# Patient Record
Sex: FEMALE | Race: BLACK OR AFRICAN AMERICAN | Employment: FULL TIME | URBAN - METROPOLITAN AREA
[De-identification: names, ages, dates, MRNs, and addresses within clinical notes are randomized per-mention and may not be internally consistent; named-entity substitution may affect disease eponyms.]

---

## 2017-01-07 DIAGNOSIS — I10 ESSENTIAL HYPERTENSION, BENIGN: ICD-10-CM

## 2017-01-07 LAB
ANION GAP SERPL CALCULATED.3IONS-SCNC: 12 MEQ/L (ref 7–13)
BUN BLDV-MCNC: 13 MG/DL (ref 6–20)
CALCIUM SERPL-MCNC: 10 MG/DL (ref 8.6–10.2)
CHLORIDE BLD-SCNC: 99 MEQ/L (ref 98–107)
CO2: 23 MEQ/L (ref 22–29)
CREAT SERPL-MCNC: 0.87 MG/DL (ref 0.5–0.9)
GFR AFRICAN AMERICAN: >60
GFR NON-AFRICAN AMERICAN: >60
GLUCOSE BLD-MCNC: 135 MG/DL (ref 74–109)
HBA1C MFR BLD: 7.3 % (ref 4.8–5.9)
POTASSIUM SERPL-SCNC: 4.1 MEQ/L (ref 3.5–5.1)
SODIUM BLD-SCNC: 134 MEQ/L (ref 132–144)

## 2017-01-09 ENCOUNTER — OFFICE VISIT (OUTPATIENT)
Dept: INTERNAL MEDICINE | Age: 47
End: 2017-01-09

## 2017-01-09 VITALS
DIASTOLIC BLOOD PRESSURE: 80 MMHG | OXYGEN SATURATION: 99 % | RESPIRATION RATE: 16 BRPM | SYSTOLIC BLOOD PRESSURE: 134 MMHG | BODY MASS INDEX: 34.16 KG/M2 | WEIGHT: 205 LBS | HEIGHT: 65 IN | TEMPERATURE: 97.9 F | HEART RATE: 92 BPM

## 2017-01-09 DIAGNOSIS — J18.9 PNEUMONIA OF LEFT LOWER LOBE DUE TO INFECTIOUS ORGANISM: ICD-10-CM

## 2017-01-09 DIAGNOSIS — R94.31 PROLONGED Q-T INTERVAL ON ECG: ICD-10-CM

## 2017-01-09 DIAGNOSIS — I10 ESSENTIAL HYPERTENSION, BENIGN: Primary | ICD-10-CM

## 2017-01-09 DIAGNOSIS — D72.828 OTHER ELEVATED WHITE BLOOD CELL (WBC) COUNT: ICD-10-CM

## 2017-01-09 DIAGNOSIS — Z86.79 HISTORY OF SUPRAVENTRICULAR TACHYCARDIA: ICD-10-CM

## 2017-01-09 PROCEDURE — 99214 OFFICE O/P EST MOD 30 MIN: CPT | Performed by: INTERNAL MEDICINE

## 2017-01-09 RX ORDER — DOXYCYCLINE 100 MG/1
1 CAPSULE ORAL 2 TIMES DAILY
COMMUNITY
Start: 2016-12-30 | End: 2017-04-24

## 2017-01-09 ASSESSMENT — ENCOUNTER SYMPTOMS
CONSTIPATION: 0
DIARRHEA: 0
VOMITING: 0
WHEEZING: 0
COUGH: 1
SHORTNESS OF BREATH: 0
ABDOMINAL PAIN: 0
NAUSEA: 0

## 2017-01-28 ENCOUNTER — HOSPITAL ENCOUNTER (OUTPATIENT)
Dept: GENERAL RADIOLOGY | Age: 47
Discharge: HOME | End: 2017-01-28
Payer: COMMERCIAL

## 2017-01-28 DIAGNOSIS — D72.828 OTHER ELEVATED WHITE BLOOD CELL (WBC) COUNT: ICD-10-CM

## 2017-01-28 DIAGNOSIS — J18.9 PNEUMONIA OF LEFT LOWER LOBE DUE TO INFECTIOUS ORGANISM: ICD-10-CM

## 2017-01-28 LAB
HCT VFR BLD CALC: 37 % (ref 37–47)
HEMOGLOBIN: 11.9 G/DL (ref 12–16)
MCH RBC QN AUTO: 23.7 PG (ref 27–31.3)
MCHC RBC AUTO-ENTMCNC: 32 % (ref 33–37)
MCV RBC AUTO: 73.8 FL (ref 82–100)
PDW BLD-RTO: 14 % (ref 11.5–14.5)
PLATELET # BLD: 219 K/UL (ref 130–400)
RBC # BLD: 5.02 M/UL (ref 4.2–5.4)
WBC # BLD: 8.6 K/UL (ref 4.8–10.8)

## 2017-01-28 PROCEDURE — 71020 XR CHEST STANDARD TWO VW: CPT

## 2017-02-01 ENCOUNTER — OFFICE VISIT (OUTPATIENT)
Dept: INTERNAL MEDICINE | Age: 47
End: 2017-02-01

## 2017-02-01 VITALS
DIASTOLIC BLOOD PRESSURE: 92 MMHG | SYSTOLIC BLOOD PRESSURE: 148 MMHG | BODY MASS INDEX: 34.66 KG/M2 | WEIGHT: 208 LBS | OXYGEN SATURATION: 99 % | TEMPERATURE: 98.4 F | RESPIRATION RATE: 16 BRPM | HEART RATE: 98 BPM | HEIGHT: 65 IN

## 2017-02-01 DIAGNOSIS — I10 ESSENTIAL HYPERTENSION, BENIGN: Primary | ICD-10-CM

## 2017-02-01 DIAGNOSIS — E78.1 HYPERTRIGLYCERIDEMIA: ICD-10-CM

## 2017-02-01 DIAGNOSIS — E11.9 TYPE 2 DIABETES MELLITUS WITHOUT COMPLICATION, WITH LONG-TERM CURRENT USE OF INSULIN (HCC): ICD-10-CM

## 2017-02-01 DIAGNOSIS — Z79.4 TYPE 2 DIABETES MELLITUS WITHOUT COMPLICATION, WITH LONG-TERM CURRENT USE OF INSULIN (HCC): ICD-10-CM

## 2017-02-01 DIAGNOSIS — D56.3 ALPHA THALASSEMIA TRAIT: ICD-10-CM

## 2017-02-01 PROCEDURE — 99213 OFFICE O/P EST LOW 20 MIN: CPT | Performed by: INTERNAL MEDICINE

## 2017-02-01 ASSESSMENT — ENCOUNTER SYMPTOMS
COUGH: 1
NAUSEA: 0
VOMITING: 0
SHORTNESS OF BREATH: 0
DIARRHEA: 0
WHEEZING: 0
ABDOMINAL PAIN: 0

## 2017-03-06 DIAGNOSIS — I10 ESSENTIAL HYPERTENSION, BENIGN: ICD-10-CM

## 2017-03-06 RX ORDER — HYDRALAZINE HYDROCHLORIDE 25 MG/1
TABLET, FILM COATED ORAL
Qty: 120 TABLET | Refills: 1 | Status: SHIPPED | OUTPATIENT
Start: 2017-03-06 | End: 2017-05-26 | Stop reason: SDUPTHER

## 2017-03-06 RX ORDER — LOSARTAN POTASSIUM AND HYDROCHLOROTHIAZIDE 25; 100 MG/1; MG/1
TABLET ORAL
Qty: 30 TABLET | Refills: 1 | Status: SHIPPED | OUTPATIENT
Start: 2017-03-06 | End: 2017-05-07 | Stop reason: SDUPTHER

## 2017-03-06 RX ORDER — METOPROLOL SUCCINATE 100 MG/1
TABLET, EXTENDED RELEASE ORAL
Qty: 30 TABLET | Refills: 1 | Status: SHIPPED | OUTPATIENT
Start: 2017-03-06 | End: 2017-05-26 | Stop reason: SDUPTHER

## 2017-04-24 ENCOUNTER — HOSPITAL ENCOUNTER (EMERGENCY)
Age: 47
Discharge: HOME | End: 2017-04-24
Attending: EMERGENCY MEDICINE
Payer: COMMERCIAL

## 2017-04-24 ENCOUNTER — APPOINTMENT (OUTPATIENT)
Dept: CT IMAGING | Age: 47
End: 2017-04-24
Payer: COMMERCIAL

## 2017-04-24 VITALS
RESPIRATION RATE: 18 BRPM | OXYGEN SATURATION: 99 % | HEART RATE: 87 BPM | HEIGHT: 65 IN | TEMPERATURE: 98.7 F | BODY MASS INDEX: 35.32 KG/M2 | DIASTOLIC BLOOD PRESSURE: 99 MMHG | SYSTOLIC BLOOD PRESSURE: 146 MMHG | WEIGHT: 212 LBS

## 2017-04-24 DIAGNOSIS — R10.31 ABDOMINAL WALL PAIN IN RIGHT LOWER QUADRANT: Primary | ICD-10-CM

## 2017-04-24 DIAGNOSIS — T14.8XXA MUSCLE STRAIN: ICD-10-CM

## 2017-04-24 DIAGNOSIS — R10.31 ABDOMINAL PAIN, RIGHT LOWER QUADRANT: ICD-10-CM

## 2017-04-24 PROCEDURE — 99284 EMERGENCY DEPT VISIT MOD MDM: CPT

## 2017-04-24 PROCEDURE — 74176 CT ABD & PELVIS W/O CONTRAST: CPT

## 2017-04-24 RX ORDER — CYCLOBENZAPRINE HCL 10 MG
10 TABLET ORAL 3 TIMES DAILY PRN
Qty: 30 TABLET | Refills: 0 | Status: SHIPPED | OUTPATIENT
Start: 2017-04-24 | End: 2017-05-04

## 2017-04-24 ASSESSMENT — PAIN DESCRIPTION - PAIN TYPE: TYPE: ACUTE PAIN

## 2017-04-24 ASSESSMENT — ENCOUNTER SYMPTOMS
VOMITING: 0
FACIAL SWELLING: 0
ABDOMINAL DISTENTION: 0
RHINORRHEA: 0
EYE DISCHARGE: 0
WHEEZING: 0
ABDOMINAL PAIN: 1
PHOTOPHOBIA: 0
SHORTNESS OF BREATH: 0
COLOR CHANGE: 0

## 2017-04-24 ASSESSMENT — PAIN DESCRIPTION - ORIENTATION: ORIENTATION: RIGHT;LOWER

## 2017-04-24 ASSESSMENT — PAIN SCALES - GENERAL: PAINLEVEL_OUTOF10: 5

## 2017-04-24 ASSESSMENT — PAIN DESCRIPTION - LOCATION: LOCATION: ABDOMEN

## 2017-04-24 ASSESSMENT — PAIN DESCRIPTION - ONSET: ONSET: SUDDEN

## 2017-04-24 ASSESSMENT — PAIN DESCRIPTION - FREQUENCY: FREQUENCY: CONTINUOUS

## 2017-04-24 ASSESSMENT — PAIN DESCRIPTION - DESCRIPTORS: DESCRIPTORS: SHARP

## 2017-05-01 ENCOUNTER — OFFICE VISIT (OUTPATIENT)
Dept: INTERNAL MEDICINE | Age: 47
End: 2017-05-01

## 2017-05-01 VITALS
HEIGHT: 65 IN | DIASTOLIC BLOOD PRESSURE: 92 MMHG | HEART RATE: 92 BPM | OXYGEN SATURATION: 100 % | BODY MASS INDEX: 35.32 KG/M2 | TEMPERATURE: 98.3 F | RESPIRATION RATE: 16 BRPM | WEIGHT: 212 LBS | SYSTOLIC BLOOD PRESSURE: 156 MMHG

## 2017-05-01 DIAGNOSIS — E11.9 TYPE 2 DIABETES MELLITUS WITHOUT COMPLICATION, WITH LONG-TERM CURRENT USE OF INSULIN (HCC): ICD-10-CM

## 2017-05-01 DIAGNOSIS — S39.011D ABDOMINAL WALL STRAIN, SUBSEQUENT ENCOUNTER: Primary | ICD-10-CM

## 2017-05-01 DIAGNOSIS — Z79.4 TYPE 2 DIABETES MELLITUS WITHOUT COMPLICATION, WITH LONG-TERM CURRENT USE OF INSULIN (HCC): ICD-10-CM

## 2017-05-01 DIAGNOSIS — D56.3 ALPHA THALASSEMIA TRAIT: ICD-10-CM

## 2017-05-01 DIAGNOSIS — E78.1 HYPERTRIGLYCERIDEMIA: ICD-10-CM

## 2017-05-01 DIAGNOSIS — I10 ESSENTIAL HYPERTENSION, BENIGN: ICD-10-CM

## 2017-05-01 PROCEDURE — 99214 OFFICE O/P EST MOD 30 MIN: CPT | Performed by: INTERNAL MEDICINE

## 2017-05-01 ASSESSMENT — ENCOUNTER SYMPTOMS
CONSTIPATION: 0
ABDOMINAL PAIN: 1
COUGH: 0
WHEEZING: 0
DIARRHEA: 0
VOMITING: 0
SHORTNESS OF BREATH: 0
ABDOMINAL DISTENTION: 0
NAUSEA: 0
BLOOD IN STOOL: 0

## 2017-05-07 DIAGNOSIS — I10 ESSENTIAL HYPERTENSION, BENIGN: ICD-10-CM

## 2017-05-08 RX ORDER — LOSARTAN POTASSIUM AND HYDROCHLOROTHIAZIDE 25; 100 MG/1; MG/1
TABLET ORAL
Qty: 30 TABLET | Refills: 1 | Status: SHIPPED | OUTPATIENT
Start: 2017-05-08

## 2017-05-26 DIAGNOSIS — I10 ESSENTIAL HYPERTENSION, BENIGN: ICD-10-CM

## 2017-05-26 RX ORDER — METOPROLOL SUCCINATE 100 MG/1
TABLET, EXTENDED RELEASE ORAL
Qty: 30 TABLET | Refills: 0 | Status: SHIPPED | OUTPATIENT
Start: 2017-05-26

## 2017-05-26 RX ORDER — HYDRALAZINE HYDROCHLORIDE 25 MG/1
TABLET, FILM COATED ORAL
Qty: 120 TABLET | Refills: 0 | Status: SHIPPED | OUTPATIENT
Start: 2017-05-26

## 2017-06-12 RX ORDER — BLOOD-GLUCOSE METER
KIT MISCELLANEOUS
Qty: 100 STRIP | Refills: 0 | Status: SHIPPED | OUTPATIENT
Start: 2017-06-12

## 2019-05-31 ENCOUNTER — HOSPITAL ENCOUNTER (EMERGENCY)
Age: 49
Discharge: HOME | End: 2019-05-31
Payer: COMMERCIAL

## 2019-05-31 VITALS
TEMPERATURE: 99.4 F | HEIGHT: 63 IN | WEIGHT: 220 LBS | DIASTOLIC BLOOD PRESSURE: 78 MMHG | RESPIRATION RATE: 20 BRPM | SYSTOLIC BLOOD PRESSURE: 152 MMHG | BODY MASS INDEX: 38.98 KG/M2 | OXYGEN SATURATION: 100 % | HEART RATE: 81 BPM

## 2019-05-31 DIAGNOSIS — R10.84 GENERALIZED ABDOMINAL PAIN: ICD-10-CM

## 2019-05-31 DIAGNOSIS — R11.2 NON-INTRACTABLE VOMITING WITH NAUSEA, UNSPECIFIED VOMITING TYPE: Primary | ICD-10-CM

## 2019-05-31 LAB
ALBUMIN SERPL-MCNC: 4.3 G/DL (ref 3.5–4.6)
ALP BLD-CCNC: 62 U/L (ref 40–130)
ALT SERPL-CCNC: 15 U/L (ref 0–33)
ANION GAP SERPL CALCULATED.3IONS-SCNC: 20 MEQ/L (ref 9–15)
AST SERPL-CCNC: 21 U/L (ref 0–35)
BASOPHILS ABSOLUTE: 0.1 K/UL (ref 0–0.2)
BASOPHILS RELATIVE PERCENT: 0.8 %
BILIRUB SERPL-MCNC: 0.9 MG/DL (ref 0.2–0.7)
BILIRUBIN URINE: NEGATIVE
BLOOD, URINE: NEGATIVE
BUN BLDV-MCNC: 13 MG/DL (ref 6–20)
CALCIUM SERPL-MCNC: 10 MG/DL (ref 8.5–9.9)
CHLORIDE BLD-SCNC: 92 MEQ/L (ref 95–107)
CHP ED QC CHECK: YES
CHP ED QC CHECK: YES
CLARITY: CLEAR
CO2: 20 MEQ/L (ref 20–31)
COLOR: YELLOW
CREAT SERPL-MCNC: 0.64 MG/DL (ref 0.5–0.9)
EOSINOPHILS ABSOLUTE: 0 K/UL (ref 0–0.7)
EOSINOPHILS RELATIVE PERCENT: 0.4 %
GFR AFRICAN AMERICAN: >60
GFR NON-AFRICAN AMERICAN: >60
GLOBULIN: 3.8 G/DL (ref 2.3–3.5)
GLUCOSE BLD-MCNC: 260 MG/DL
GLUCOSE BLD-MCNC: 260 MG/DL
GLUCOSE BLD-MCNC: 260 MG/DL (ref 60–115)
GLUCOSE BLD-MCNC: 265 MG/DL (ref 70–99)
GLUCOSE URINE: >=1000 MG/DL
HCT VFR BLD CALC: 39.1 % (ref 37–47)
HEMOGLOBIN: 13.3 G/DL (ref 12–16)
KETONES, URINE: ABNORMAL MG/DL
LEUKOCYTE ESTERASE, URINE: NEGATIVE
LIPASE: 29 U/L (ref 12–95)
LYMPHOCYTES ABSOLUTE: 1.4 K/UL (ref 1–4.8)
LYMPHOCYTES RELATIVE PERCENT: 12.8 %
MCH RBC QN AUTO: 24.2 PG (ref 27–31.3)
MCHC RBC AUTO-ENTMCNC: 34.1 % (ref 33–37)
MCV RBC AUTO: 71 FL (ref 82–100)
MICROCYTES: ABNORMAL
MONOCYTES ABSOLUTE: 0.6 K/UL (ref 0.2–0.8)
MONOCYTES RELATIVE PERCENT: 5.9 %
NEUTROPHILS ABSOLUTE: 8.7 K/UL (ref 1.4–6.5)
NEUTROPHILS RELATIVE PERCENT: 80.1 %
NITRITE, URINE: NEGATIVE
PDW BLD-RTO: 14 % (ref 11.5–14.5)
PERFORMED ON: ABNORMAL
PH UA: 8.5 (ref 5–9)
PLATELET # BLD: 208 K/UL (ref 130–400)
POTASSIUM SERPL-SCNC: 4.1 MEQ/L (ref 3.4–4.9)
PROTEIN UA: NEGATIVE MG/DL
RBC # BLD: 5.51 M/UL (ref 4.2–5.4)
SODIUM BLD-SCNC: 132 MEQ/L (ref 135–144)
SPECIFIC GRAVITY UA: 1.02 (ref 1–1.03)
TOTAL PROTEIN: 8.1 G/DL (ref 6.3–8)
URINE REFLEX TO CULTURE: ABNORMAL
UROBILINOGEN, URINE: 0.2 E.U./DL
WBC # BLD: 10.9 K/UL (ref 4.8–10.8)

## 2019-05-31 PROCEDURE — 80053 COMPREHEN METABOLIC PANEL: CPT

## 2019-05-31 PROCEDURE — 85025 COMPLETE CBC W/AUTO DIFF WBC: CPT

## 2019-05-31 PROCEDURE — 81003 URINALYSIS AUTO W/O SCOPE: CPT

## 2019-05-31 PROCEDURE — 83690 ASSAY OF LIPASE: CPT

## 2019-05-31 PROCEDURE — 36415 COLL VENOUS BLD VENIPUNCTURE: CPT

## 2019-05-31 PROCEDURE — 96374 THER/PROPH/DIAG INJ IV PUSH: CPT

## 2019-05-31 PROCEDURE — 2580000003 HC RX 258: Performed by: PERSONAL EMERGENCY RESPONSE ATTENDANT

## 2019-05-31 PROCEDURE — 96375 TX/PRO/DX INJ NEW DRUG ADDON: CPT

## 2019-05-31 PROCEDURE — 99284 EMERGENCY DEPT VISIT MOD MDM: CPT

## 2019-05-31 PROCEDURE — 6360000002 HC RX W HCPCS: Performed by: PERSONAL EMERGENCY RESPONSE ATTENDANT

## 2019-05-31 RX ORDER — 0.9 % SODIUM CHLORIDE 0.9 %
1000 INTRAVENOUS SOLUTION INTRAVENOUS ONCE
Status: COMPLETED | OUTPATIENT
Start: 2019-05-31 | End: 2019-05-31

## 2019-05-31 RX ORDER — ONDANSETRON 2 MG/ML
4 INJECTION INTRAMUSCULAR; INTRAVENOUS ONCE
Status: COMPLETED | OUTPATIENT
Start: 2019-05-31 | End: 2019-05-31

## 2019-05-31 RX ORDER — KETOROLAC TROMETHAMINE 30 MG/ML
30 INJECTION, SOLUTION INTRAMUSCULAR; INTRAVENOUS ONCE
Status: COMPLETED | OUTPATIENT
Start: 2019-05-31 | End: 2019-05-31

## 2019-05-31 RX ADMIN — ONDANSETRON 4 MG: 2 INJECTION INTRAMUSCULAR; INTRAVENOUS at 00:53

## 2019-05-31 RX ADMIN — KETOROLAC TROMETHAMINE 30 MG: 30 INJECTION, SOLUTION INTRAMUSCULAR; INTRAVENOUS at 00:53

## 2019-05-31 RX ADMIN — SODIUM CHLORIDE 1000 ML: 9 INJECTION, SOLUTION INTRAVENOUS at 00:53

## 2019-05-31 ASSESSMENT — ENCOUNTER SYMPTOMS
COLOR CHANGE: 0
SORE THROAT: 0
DIARRHEA: 1
VOMITING: 1
COUGH: 0
SHORTNESS OF BREATH: 0
RHINORRHEA: 0
NAUSEA: 1
ABDOMINAL PAIN: 1
BLOOD IN STOOL: 0

## 2019-05-31 ASSESSMENT — PAIN DESCRIPTION - FREQUENCY
FREQUENCY: CONTINUOUS
FREQUENCY: CONTINUOUS

## 2019-05-31 ASSESSMENT — PAIN SCALES - GENERAL
PAINLEVEL_OUTOF10: 3
PAINLEVEL_OUTOF10: 10

## 2019-05-31 ASSESSMENT — PAIN DESCRIPTION - ORIENTATION: ORIENTATION: MID

## 2019-05-31 ASSESSMENT — PAIN DESCRIPTION - DESCRIPTORS
DESCRIPTORS: ACHING
DESCRIPTORS: ACHING

## 2019-05-31 ASSESSMENT — PAIN DESCRIPTION - PAIN TYPE
TYPE: ACUTE PAIN
TYPE: ACUTE PAIN

## 2019-05-31 ASSESSMENT — PAIN DESCRIPTION - LOCATION
LOCATION: ABDOMEN
LOCATION: ABDOMEN

## 2019-05-31 NOTE — ED NOTES
Patient ambulated to restroom with this Nurse. Gait steady. Assisted patient back to room.   Provided comfort measures, SR up x2, call light in reach and will continue to monitor      Librado Molina RN  05/31/19 0110

## 2019-05-31 NOTE — ED TRIAGE NOTES
Pt presents from home with c/o vomiting. Onset 5pm.  Pt a&o x4. resp even. Skin tan, warm, dry. Pt reports generalized abd pain, nausea. No active emesis noted. Oral temp 99.4. Pt denies diarrhea. abd soft, tender upon palpation. Last bm x1 day, soft in appearance.

## 2019-05-31 NOTE — ED PROVIDER NOTES
[address] [facility] ED  eMERGENCY dEPARTMENT eNCOUnter      Pt Name: Ama Pringle  MRN: [id number]  [place] 1970  Date of evaluation: 5/31/2019  Provider: Abby Matthews [facility]. is a 57 y.o. female with PMHx of HTN, DM2, obesity, alpha thalassemia trait, uterine fibroids presents to the emergency department with vomiting. Pt states around 5pm she started with vomiting,abdominal cramping and loose stools. She may have eaten bad food. She has had 5-6 episodes of emesis. She has generalized abdominal pain. She denies fevers, ill contacts, urinary complaints. She denies ETOH or drug use. HPI    Nursing Notes were reviewed. REVIEW OF SYSTEMS       Review of Systems   Constitutional: Negative for appetite change, chills and fever. HENT: Negative for congestion, rhinorrhea and sore throat. Respiratory: Negative for cough and shortness of breath. Cardiovascular: Negative for chest pain. Gastrointestinal: Positive for abdominal pain, diarrhea, nausea and vomiting. Negative for blood in stool. Genitourinary: Negative for difficulty urinating. Musculoskeletal: Negative for neck stiffness. Skin: Negative for color change and rash. Neurological: Negative for dizziness, syncope, weakness, light-headedness, numbness and headaches. All other systems reviewed and are negative. PAST MEDICAL HISTORY     Past Medical History:   Diagnosis Date    Bilateral chronic otitis media August 2015    DMII (diabetes mellitus, type 2) ([facility] [place] [address] 2008    Fibroid, uterus     History of medication noncompliance     HTN (hypertension) 2009    Obesity (BMI 30-39. 9)          SURGICAL HISTORY       Past Surgical History:   Procedure Laterality Date    HYSTERECTOMY  2009    fibroids, Dr Mike Hwang, ovaries intact    MYRINGOTOMY AND TYMPANOSTOMY TUBE PLACEMENT Bilateral 8/13/2015    Dr. Annette Dewitt       Previous Medications    BLOOD GLUCOSE MONITORING SUPPL (FREESTYLE LITE) YULIYA    1 Device by Does not apply route daily as needed    FREESTYLE LANCETS MISC    1 each by Does not apply route daily    FREESTYLE LITE STRIP    TEST TWICE DAILY    HYDRALAZINE (APRESOLINE) 25 MG TABLET    TAKE 2 TABLETS BY MOUTH TWICE DAILY    LORATADINE-PSEUDOEPHEDRINE (LORATADINE-D 24HR PO)    Take by mouth    LOSARTAN-HYDROCHLOROTHIAZIDE (HYZAAR) 100-25 MG PER TABLET    TAKE 1 TABLET BY MOUTH DAILY    METFORMIN (GLUCOPHAGE XR) 500 MG EXTENDED RELEASE TABLET    2 po bid    METOPROLOL SUCCINATE (TOPROL XL) 100 MG EXTENDED RELEASE TABLET    TAKE 1 TABLET BY MOUTH DAILY    MULTIPLE VITAMINS-MINERALS (MULTIVITAMIN ADULT PO)    Take 1 tablet by mouth daily    SITAGLIPTIN (JANUVIA) 100 MG TABLET    TAKE 1 TABLET BY MOUTH DAILY       ALLERGIES     Patient has no known allergies. 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    Occupation: [employer]      Comment: [employer] full time agency   Social Needs    Financial resource strain: None    Food insecurity:     Worry: None     Inability: None    Transportation needs:     Medical: None     Non-medical: None   Tobacco Use    Smoking status: Former Smoker     Packs/day: 1.00     Years: 15.00     Pack years: 15.00     Types: Cigarettes     Last attempt to quit: 2005     Years since quittin.2    Smokeless tobacco: Current User     Types: Chew   Substance and Sexual Activity    Alcohol use:  Yes     Alcohol/week: 0.0 oz     Comment: occasional wine consumption not to excess    Drug use: No    Sexual activity: None   Lifestyle    Physical activity:     Days per week: None     Minutes per session: None    Stress: None   Relationships    Social connections:     Talks on phone: None     Gets together: None     Attends Gnosticism service: None     Active member of club or organization: None     Attends meetings of clubs or organizations: None     Relationship status: None    Intimate partner violence:     Fear of current or ex partner: None     Emotionally abused: None     Physically abused: None     Forced sexual activity: None   Other Topics Concern    None   Social History Narrative    None         PHYSICAL EXAM         ED Triage Vitals [05/31/19 0014]   BP Temp Temp Source Pulse Resp SpO2 Height Weight   (!) 176/112 99.4 °F (37.4 °C) Oral 107 18 100 % -- --       Physical Exam   Constitutional: She is oriented to person, place, and time. She appears well-developed and well-nourished. HENT:   Head: Normocephalic and atraumatic. Mouth/Throat: Oropharynx is clear and moist.   Eyes: Pupils are equal, round, and reactive to light. Conjunctivae and EOM are normal.   Neck: Normal range of motion. Neck supple. No tracheal deviation present. Cardiovascular: Normal heart sounds and intact distal pulses. Pulmonary/Chest: Effort normal and breath sounds normal. No stridor. No respiratory distress. Abdominal: Soft. Bowel sounds are normal. She exhibits no distension and no mass. There is tenderness. There is no rebound and no guarding. Moaning in the cart rolling around, mild generalized abdominal TTP, moderate tenderness epigastrically. Abdomen soft and nondistended, no rebound or rigidity, pulsatile mass or bruit   Musculoskeletal: Normal range of motion. Neurological: She is alert and oriented to person, place, and time. She has normal reflexes. Skin: Skin is warm and dry. Capillary refill takes less than 2 seconds. No rash noted. Psychiatric: She has a normal mood and affect.  Her behavior is normal. Judgment and thought content normal.       DIAGNOSTIC RESULTS     EKG:All EKG's are interpreted by the Emergency Department Physician who either signs or Co-signs this chart in the absence of a cardiologist.        RADIOLOGY:   Non-plain film images such as CT, Ultrasound and MRI are read by theradiologist. Plain radiographic images are visualized and preliminarily interpreted by the emergency physician with the below findings:    Interpretation per theRadiologist below, if available at the time of this note:    No orders to display           LABS:  Labs Reviewed   COMPREHENSIVE METABOLIC PANEL - Abnormal; Notable for the following components:       Result Value    Sodium 132 (*)     Chloride 92 (*)     Anion Gap 20 (*)     Glucose 265 (*)     Calcium 10.0 (*)     Total Protein 8.1 (*)     Total Bilirubin 0.9 (*)     Globulin 3.8 (*)     All other components within normal limits   CBC WITH AUTO DIFFERENTIAL - Abnormal; Notable for the following components:    WBC 10.9 (*)     RBC 5.51 (*)     MCV 71.0 (*)     MCH 24.2 (*)     Neutrophils # 8.7 (*)     All other components within normal limits   URINE RT REFLEX TO CULTURE - Abnormal; Notable for the following components:    Glucose, Ur >=1000 (*)     Ketones, Urine TRACE (*)     All other components within normal limits   POCT GLUCOSE - Abnormal; Notable for the following components:    POC Glucose 260 (*)     All other components within normal limits   POCT GLUCOSE - Normal   POCT GLUCOSE - Normal   LIPASE       All other labs were within normal range or not returned as of this dictation. EMERGENCY DEPARTMENT COURSE and DIFFERENTIAL DIAGNOSIS/MDM:   Vitals:    Vitals:    05/31/19 0014 05/31/19 0111 05/31/19 0128   BP: (!) 176/112 (!) 172/96 (!) 170/81   Pulse: 107 98 82   Resp: 18 20 20   Temp: 99.4 °F (37.4 °C)     TempSrc: Oral     SpO2: 100% 98%    Weight:   220 lb (99.8 kg)   Height:   5' 3\" (1.6 m)         MDM    Bloodwork reveals sodium 132, chloride 92, anion gap 20, glucose 265, WBC 10.9.   Patient reassessment is sleeping comfortably in the cart. She received Zofran, Toradol, IV fluids and states symptoms are much improved. She is eager to go home. There is been no emesis while in the emergency room. I do feel viral illness is present at this time. Standard anticipatory guidance given to patient upon discharge. Have given them a specific time frame in which to follow-up and who to follow-up with. I have also advised them that they should return to the emergency department if they get worse, or not getting better or develop any new or concerning symptoms. Patient demonstrates understanding. CRITICAL CARE TIME   Total Critical Caretime was 0 minutes, excluding separately reportable procedures. There was a high probability of clinically significant/life threatening deterioration in the patient's condition which required my urgent intervention. Procedures    FINAL IMPRESSION      1. Non-intractable vomiting with nausea, unspecified vomiting type    2. Generalized abdominal pain          DISPOSITION/PLAN   DISPOSITION Decision To Discharge 05/31/2019 02:18:10 AM      PATIENT REFERRED TO:  [address] [place] [address]  [phone]  In 3 days        DISCHARGE MEDICATIONS:  New Prescriptions    No medications on file          (Please notethat portions of this note were completed with a voice recognition program.  Efforts were made to edit the dictations but occasionally words are mis-transcribed. )    RADHA Renee (electronically signed)  Emergency Physician Ryan Bradford [address]  48/49/96 1145

## 2020-04-06 ENCOUNTER — APPOINTMENT (OUTPATIENT)
Dept: GENERAL RADIOLOGY | Age: 50
End: 2020-04-06
Payer: COMMERCIAL

## 2020-04-06 ENCOUNTER — HOSPITAL ENCOUNTER (EMERGENCY)
Age: 50
Discharge: HOME | End: 2020-04-06
Payer: COMMERCIAL

## 2020-04-06 VITALS
DIASTOLIC BLOOD PRESSURE: 105 MMHG | TEMPERATURE: 98.8 F | WEIGHT: 198 LBS | OXYGEN SATURATION: 97 % | BODY MASS INDEX: 30.01 KG/M2 | HEART RATE: 117 BPM | RESPIRATION RATE: 18 BRPM | HEIGHT: 68 IN | SYSTOLIC BLOOD PRESSURE: 165 MMHG

## 2020-04-06 LAB
ALBUMIN SERPL-MCNC: 4.2 G/DL (ref 3.5–4.6)
ALP BLD-CCNC: 89 U/L (ref 40–130)
ALT SERPL-CCNC: 29 U/L (ref 0–33)
ANION GAP SERPL CALCULATED.3IONS-SCNC: 17 MEQ/L (ref 9–15)
AST SERPL-CCNC: 19 U/L (ref 0–35)
BASOPHILS ABSOLUTE: 0.1 K/UL (ref 0–0.2)
BASOPHILS RELATIVE PERCENT: 1.4 %
BILIRUB SERPL-MCNC: 0.4 MG/DL (ref 0.2–0.7)
BUN BLDV-MCNC: 11 MG/DL (ref 6–20)
CALCIUM SERPL-MCNC: 9.4 MG/DL (ref 8.5–9.9)
CHLORIDE BLD-SCNC: 94 MEQ/L (ref 95–107)
CO2: 21 MEQ/L (ref 20–31)
CREAT SERPL-MCNC: 0.65 MG/DL (ref 0.5–0.9)
EOSINOPHILS ABSOLUTE: 0 K/UL (ref 0–0.7)
EOSINOPHILS RELATIVE PERCENT: 0.2 %
GFR AFRICAN AMERICAN: >60
GFR NON-AFRICAN AMERICAN: >60
GLOBULIN: 3.9 G/DL (ref 2.3–3.5)
GLUCOSE BLD-MCNC: 345 MG/DL (ref 70–99)
HCT VFR BLD CALC: 40.8 % (ref 37–47)
HEMOGLOBIN: 13.5 G/DL (ref 12–16)
INFLUENZA A BY PCR: NEGATIVE
INFLUENZA B BY PCR: NEGATIVE
LYMPHOCYTES ABSOLUTE: 3.4 K/UL (ref 1–4.8)
LYMPHOCYTES RELATIVE PERCENT: 44.5 %
MCH RBC QN AUTO: 23.2 PG (ref 27–31.3)
MCHC RBC AUTO-ENTMCNC: 33.1 % (ref 33–37)
MCV RBC AUTO: 70.2 FL (ref 82–100)
MICROCYTES: ABNORMAL
MONOCYTES ABSOLUTE: 0.7 K/UL (ref 0.2–0.8)
MONOCYTES RELATIVE PERCENT: 8.9 %
NEUTROPHILS ABSOLUTE: 3.5 K/UL (ref 1.4–6.5)
NEUTROPHILS RELATIVE PERCENT: 45 %
PDW BLD-RTO: 13 % (ref 11.5–14.5)
PLATELET # BLD: 165 K/UL (ref 130–400)
POTASSIUM SERPL-SCNC: 4 MEQ/L (ref 3.4–4.9)
RBC # BLD: 5.81 M/UL (ref 4.2–5.4)
SLIDE REVIEW: ABNORMAL
SODIUM BLD-SCNC: 132 MEQ/L (ref 135–144)
STREP GRP A PCR: NEGATIVE
TOTAL PROTEIN: 8.1 G/DL (ref 6.3–8)
WBC # BLD: 7.7 K/UL (ref 4.8–10.8)

## 2020-04-06 PROCEDURE — 99283 EMERGENCY DEPT VISIT LOW MDM: CPT

## 2020-04-06 PROCEDURE — 6370000000 HC RX 637 (ALT 250 FOR IP): Performed by: PHYSICIAN ASSISTANT

## 2020-04-06 PROCEDURE — 85025 COMPLETE CBC W/AUTO DIFF WBC: CPT

## 2020-04-06 PROCEDURE — 80053 COMPREHEN METABOLIC PANEL: CPT

## 2020-04-06 PROCEDURE — 87502 INFLUENZA DNA AMP PROBE: CPT

## 2020-04-06 PROCEDURE — 87651 STREP A DNA AMP PROBE: CPT

## 2020-04-06 PROCEDURE — 36415 COLL VENOUS BLD VENIPUNCTURE: CPT

## 2020-04-06 PROCEDURE — 71045 X-RAY EXAM CHEST 1 VIEW: CPT

## 2020-04-06 RX ORDER — AZITHROMYCIN 500 MG/1
500 TABLET, FILM COATED ORAL DAILY
Qty: 5 TABLET | Refills: 0 | Status: SHIPPED | OUTPATIENT
Start: 2020-04-06 | End: 2020-04-11

## 2020-04-06 RX ORDER — ACETAMINOPHEN 500 MG
1000 TABLET ORAL ONCE
Status: COMPLETED | OUTPATIENT
Start: 2020-04-06 | End: 2020-04-06

## 2020-04-06 RX ADMIN — ACETAMINOPHEN 1000 MG: 500 TABLET ORAL at 06:08

## 2020-04-06 ASSESSMENT — PAIN DESCRIPTION - PAIN TYPE: TYPE: ACUTE PAIN

## 2020-04-06 ASSESSMENT — ENCOUNTER SYMPTOMS
ABDOMINAL PAIN: 0
COLOR CHANGE: 0
VOMITING: 0
SHORTNESS OF BREATH: 0
COUGH: 1
CHEST TIGHTNESS: 0
NAUSEA: 0
SINUS PAIN: 0
EYE DISCHARGE: 0
RHINORRHEA: 0
EYE REDNESS: 0
DIARRHEA: 1
BACK PAIN: 0

## 2020-04-06 ASSESSMENT — PAIN DESCRIPTION - DESCRIPTORS: DESCRIPTORS: ACHING

## 2020-04-06 ASSESSMENT — PAIN SCALES - GENERAL: PAINLEVEL_OUTOF10: 2

## 2020-04-06 ASSESSMENT — PAIN DESCRIPTION - LOCATION: LOCATION: HEAD;GENERALIZED

## 2020-04-06 NOTE — ED NOTES
Mann, [address] aware of patient's BP and HR. Mann advised that after patient is feeling better she needs to follow up with her PCP regarding BP. Patient is aware and understands.  Patient has no further questions at this time     Renee Osgood, RN  04/06/20 6615

## 2020-04-06 NOTE — ED PROVIDER NOTES
[address] [facility] ED  eMERGENCY dEPARTMENTeNCOUnter      Pt Name: Ilia Duong  MRN: [id number]  [place] 1970  Date ofevaluation: 4/6/2020  Provider: Farhat Dominguez PA-C    CHIEF COMPLAINT       Chief Complaint   Patient presents with    Cough     chills, headache, nausea         HISTORY OF PRESENT ILLNESS   (Location/Symptom, Timing/Onset,Context/Setting, Quality, Duration, Modifying Factors, Severity)  Note limiting factors. Ilia Duong is a 54 y.o. female who presents to the emergency department with gradual onset, moderate, constant, global myalgias that started on Friday and have gotten progressively worse w/ associated fevers, and cough. Pt states she had one bout of diarrhea on Saturday, but denies nausea and vomiting. 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 HPI    NursingNotes were reviewed. REVIEW OF SYSTEMS    (2-9 systems for level 4, 10 or more for level 5)     Review of Systems   Constitutional: Positive for activity change, chills, fatigue and fever. HENT: Negative for congestion, hearing loss, rhinorrhea, sinus pain, sneezing and tinnitus. Eyes: Negative for discharge, redness and visual disturbance. Respiratory: Positive for cough. Negative for chest tightness and shortness of breath. Cardiovascular: Negative for chest pain and leg swelling. Gastrointestinal: Positive for diarrhea. Negative for abdominal pain, nausea and vomiting. Endocrine: Negative for heat intolerance, polydipsia and polyuria. Genitourinary: Negative for dysuria, flank pain, hematuria and urgency. Musculoskeletal: Positive for myalgias. Negative for back pain and joint swelling. Skin: Negative for color change, rash and wound. Allergic/Immunologic: Negative for immunocompromised state. Neurological: Negative for tremors, seizures, syncope, light-headedness and headaches. external ear normal.      Nose: Nose normal.      Mouth/Throat:      Mouth: Mucous membranes are moist.      Pharynx: Oropharynx is clear. No oropharyngeal exudate or posterior oropharyngeal erythema. Eyes:      Conjunctiva/sclera: Conjunctivae normal.      Pupils: Pupils are equal, round, and reactive to light. Neck:      Musculoskeletal: Normal range of motion. No neck rigidity. Cardiovascular:      Rate and Rhythm: Normal rate and regular rhythm. Pulses: Normal pulses. Heart sounds: Normal heart sounds. No murmur. No friction rub. Pulmonary:      Effort: Pulmonary effort is normal. No respiratory distress. Breath sounds: Normal breath sounds. No stridor. Abdominal:      General: Abdomen is flat. Bowel sounds are normal.      Palpations: Abdomen is soft. Genitourinary:     Vagina: No vaginal discharge. Musculoskeletal: Normal range of motion. General: No swelling or tenderness. Neurological:      General: No focal deficit present. Mental Status: She is alert. Psychiatric:         Mood and Affect: Mood normal.         Behavior: Behavior normal.         DIAGNOSTIC RESULTS     EKG: All EKG's are interpreted by the Emergency Department Physician who either signs or Co-signsthis chart in the absence of a cardiologist.        RADIOLOGY:   Non-plain filmimages such as CT, Ultrasound and MRI are read by the radiologist. Plain radiographic images are visualized and preliminarily interpreted by the emergency physician with the below findings:    CXR negative for infiltrates.      Interpretation per the Radiologist below, if available at the time ofthis note:    XR CHEST PORTABLE    (Results Pending)         ED BEDSIDE ULTRASOUND:   Performed by ED Physician - none    LABS:  Labs Reviewed   COMPREHENSIVE METABOLIC PANEL - Abnormal; Notable for the following components:       Result Value    Sodium 132 (*)     Chloride 94 (*)     Anion Gap 17 (*)     Glucose 345 (*)     Total Protein 8.1 (*)     Globulin 3.9 (*)     All other components within normal limits   CBC WITH AUTO DIFFERENTIAL - Abnormal; Notable for the following components:    RBC 5.81 (*)     MCV 70.2 (*)     MCH 23.2 (*)     All other components within normal limits   RAPID INFLUENZA A/B ANTIGENS   RAPID STREP SCREEN       All other labs were within normal range or not returned as of this dictation. EMERGENCY DEPARTMENT COURSE and DIFFERENTIAL DIAGNOSIS/MDM:   Vitals:    Vitals:    04/06/20 0357   BP: (!) 162/121   Pulse: 120   Resp: 18   Temp: 98.8 °F (37.1 °C)   TempSrc: Oral   SpO2: 98%   Weight: 198 lb (89.8 kg)   Height: 5' 8\" (1.727 m)       54 YOA F who presents w/ cough, fever, myalgias and covid exposure. CBC,CMP, influenza and strept tests obtained. Pt had an SPO2 of 99% on RA. Pt will be reassessed. MDM        REASSESSMENT      Pt remained stable, no SOB, SPO2 of 99% her entire stay. Influenza and rapid strept came back negative. Patient educated about their test results which include potential coronavirus. Given that the patient has fever/chills, cough, n/v, myalgias, sob coronavirus was listed as one of her potential etiologies. Patient informed that the current [facility] Board recommendations are that if you symptoms are mild to go home and self quarantine for 2 weeks. Patient understands this and is in agreement of this plan. Patient given prescription for azithromycin, given infection warning signs and will go home and self quarantine. Follow up with pcp. CONSULTS:  None    PROCEDURES:  Unless otherwise noted below, none     Procedures    FINAL IMPRESSION      1.  Viral URI with cough          DISPOSITION/PLAN   DISPOSITION Decision To Discharge 04/06/2020 05:30:10 AM      PATIENT REFERREDTO:  [facility]  [facility]  [address] [address]  [phone]    If symptoms worsen, you become SOB, breathing faster than normal, feel like you can't catch your breath      DISCHARGEMEDICATIONS:  New Prescriptions    AZITHROMYCIN (ZITHROMAX) 500 MG TABLET    Take 1 tablet by mouth daily for 5 days     Controlled Substances Monitoring:     No flowsheet data found.     (Please note that portions of this note were completed with a voice recognition program.  Efforts were made to edit the dictations but occasionally words are mis-transcribed.)    Alban Kirby PA-C (electronically signed)           Alban Kirby PA-C  04/06/20 5035

## 2020-04-07 ENCOUNTER — CARE COORDINATION (OUTPATIENT)
Dept: CARE COORDINATION | Age: 50
End: 2020-04-07

## 2020-04-07 NOTE — CARE COORDINATION
Call placed to patient for COVID-19 Risk Monitoring. Unable to reach patient by phone. Unable to leave message as voicemail is full.

## 2020-04-08 ENCOUNTER — CARE COORDINATION (OUTPATIENT)
Dept: CARE COORDINATION | Age: 50
End: 2020-04-08

## 2020-04-09 ENCOUNTER — APPOINTMENT (OUTPATIENT)
Dept: CT IMAGING | Age: 50
End: 2020-04-09
Payer: COMMERCIAL

## 2020-04-09 ENCOUNTER — HOSPITAL ENCOUNTER (EMERGENCY)
Age: 50
Discharge: HOME | End: 2020-04-09
Payer: COMMERCIAL

## 2020-04-09 VITALS
BODY MASS INDEX: 33.8 KG/M2 | TEMPERATURE: 99 F | WEIGHT: 198 LBS | RESPIRATION RATE: 19 BRPM | HEART RATE: 103 BPM | SYSTOLIC BLOOD PRESSURE: 148 MMHG | HEIGHT: 64 IN | DIASTOLIC BLOOD PRESSURE: 99 MMHG | OXYGEN SATURATION: 100 %

## 2020-04-09 LAB
ALBUMIN SERPL-MCNC: 4 G/DL (ref 3.5–4.6)
ALP BLD-CCNC: 87 U/L (ref 40–130)
ALT SERPL-CCNC: 30 U/L (ref 0–33)
ANION GAP SERPL CALCULATED.3IONS-SCNC: 21 MEQ/L (ref 9–15)
ANISOCYTOSIS: ABNORMAL
AST SERPL-CCNC: 33 U/L (ref 0–35)
BACTERIA: ABNORMAL /HPF
BASOPHILS ABSOLUTE: 0 K/UL (ref 0–0.2)
BASOPHILS RELATIVE PERCENT: 0.7 %
BILIRUB SERPL-MCNC: 0.4 MG/DL (ref 0.2–0.7)
BILIRUBIN URINE: NEGATIVE
BLOOD, URINE: NEGATIVE
BUN BLDV-MCNC: 10 MG/DL (ref 6–20)
CALCIUM SERPL-MCNC: 9.4 MG/DL (ref 8.5–9.9)
CHLORIDE BLD-SCNC: 95 MEQ/L (ref 95–107)
CLARITY: CLEAR
CO2: 20 MEQ/L (ref 20–31)
COLOR: YELLOW
CREAT SERPL-MCNC: 0.56 MG/DL (ref 0.5–0.9)
EOSINOPHILS ABSOLUTE: 0 K/UL (ref 0–0.7)
EOSINOPHILS RELATIVE PERCENT: 0 %
EPITHELIAL CELLS, UA: ABNORMAL /HPF (ref 0–5)
GFR AFRICAN AMERICAN: >60
GFR NON-AFRICAN AMERICAN: >60
GLOBULIN: 3.9 G/DL (ref 2.3–3.5)
GLUCOSE BLD-MCNC: 337 MG/DL (ref 70–99)
GLUCOSE URINE: >=1000 MG/DL
HCT VFR BLD CALC: 41.2 % (ref 37–47)
HEMOGLOBIN: 13.4 G/DL (ref 12–16)
HYALINE CASTS: ABNORMAL /HPF (ref 0–5)
KETONES, URINE: >=80 MG/DL
LEUKOCYTE ESTERASE, URINE: NEGATIVE
LIPASE: 31 U/L (ref 12–95)
LYMPHOCYTES ABSOLUTE: 1.3 K/UL (ref 1–4.8)
LYMPHOCYTES RELATIVE PERCENT: 22 %
MCH RBC QN AUTO: 22.9 PG (ref 27–31.3)
MCHC RBC AUTO-ENTMCNC: 32.6 % (ref 33–37)
MCV RBC AUTO: 70 FL (ref 82–100)
MICROCYTES: ABNORMAL
MONOCYTES ABSOLUTE: 0.3 K/UL (ref 0.2–0.8)
MONOCYTES RELATIVE PERCENT: 4.6 %
NEUTROPHILS ABSOLUTE: 4.4 K/UL (ref 1.4–6.5)
NEUTROPHILS RELATIVE PERCENT: 72.7 %
NITRITE, URINE: NEGATIVE
PDW BLD-RTO: 13 % (ref 11.5–14.5)
PH UA: 5 (ref 5–9)
PLATELET # BLD: 150 K/UL (ref 130–400)
POTASSIUM SERPL-SCNC: 4.1 MEQ/L (ref 3.4–4.9)
PROTEIN UA: 100 MG/DL
RBC # BLD: 5.88 M/UL (ref 4.2–5.4)
RBC UA: ABNORMAL /HPF (ref 0–5)
SODIUM BLD-SCNC: 136 MEQ/L (ref 135–144)
SPECIFIC GRAVITY UA: 1.04 (ref 1–1.03)
TOTAL PROTEIN: 7.9 G/DL (ref 6.3–8)
URINE REFLEX TO CULTURE: YES
UROBILINOGEN, URINE: 0.2 E.U./DL
WBC # BLD: 6.1 K/UL (ref 4.8–10.8)
WBC UA: ABNORMAL /HPF (ref 0–5)

## 2020-04-09 PROCEDURE — 83690 ASSAY OF LIPASE: CPT

## 2020-04-09 PROCEDURE — 81001 URINALYSIS AUTO W/SCOPE: CPT

## 2020-04-09 PROCEDURE — 85025 COMPLETE CBC W/AUTO DIFF WBC: CPT

## 2020-04-09 PROCEDURE — 36415 COLL VENOUS BLD VENIPUNCTURE: CPT

## 2020-04-09 PROCEDURE — 99284 EMERGENCY DEPT VISIT MOD MDM: CPT

## 2020-04-09 PROCEDURE — 74176 CT ABD & PELVIS W/O CONTRAST: CPT

## 2020-04-09 PROCEDURE — 87086 URINE CULTURE/COLONY COUNT: CPT

## 2020-04-09 PROCEDURE — 96375 TX/PRO/DX INJ NEW DRUG ADDON: CPT

## 2020-04-09 PROCEDURE — 6360000002 HC RX W HCPCS: Performed by: NURSE PRACTITIONER

## 2020-04-09 PROCEDURE — 2580000003 HC RX 258: Performed by: NURSE PRACTITIONER

## 2020-04-09 PROCEDURE — 80053 COMPREHEN METABOLIC PANEL: CPT

## 2020-04-09 PROCEDURE — 96374 THER/PROPH/DIAG INJ IV PUSH: CPT

## 2020-04-09 RX ORDER — 0.9 % SODIUM CHLORIDE 0.9 %
1000 INTRAVENOUS SOLUTION INTRAVENOUS ONCE
Status: COMPLETED | OUTPATIENT
Start: 2020-04-09 | End: 2020-04-09

## 2020-04-09 RX ORDER — KETOROLAC TROMETHAMINE 30 MG/ML
30 INJECTION, SOLUTION INTRAMUSCULAR; INTRAVENOUS ONCE
Status: COMPLETED | OUTPATIENT
Start: 2020-04-09 | End: 2020-04-09

## 2020-04-09 RX ORDER — ONDANSETRON 2 MG/ML
4 INJECTION INTRAMUSCULAR; INTRAVENOUS EVERY 10 MIN PRN
Status: DISCONTINUED | OUTPATIENT
Start: 2020-04-09 | End: 2020-04-09 | Stop reason: HOSPADM

## 2020-04-09 RX ORDER — IBUPROFEN 800 MG/1
800 TABLET ORAL EVERY 8 HOURS PRN
Qty: 20 TABLET | Refills: 0 | Status: SHIPPED | OUTPATIENT
Start: 2020-04-09

## 2020-04-09 RX ADMIN — SODIUM CHLORIDE 1000 ML: 9 INJECTION, SOLUTION INTRAVENOUS at 08:54

## 2020-04-09 RX ADMIN — KETOROLAC TROMETHAMINE 30 MG: 30 INJECTION, SOLUTION INTRAMUSCULAR at 08:54

## 2020-04-09 ASSESSMENT — ENCOUNTER SYMPTOMS
COUGH: 0
ABDOMINAL PAIN: 1
DIARRHEA: 1
VOMITING: 0
EYE PAIN: 0
BACK PAIN: 0
SORE THROAT: 0
NAUSEA: 0
PHOTOPHOBIA: 0
SHORTNESS OF BREATH: 0
RHINORRHEA: 0

## 2020-04-09 ASSESSMENT — PAIN SCALES - GENERAL: PAINLEVEL_OUTOF10: 5

## 2020-04-09 ASSESSMENT — PAIN DESCRIPTION - PAIN TYPE: TYPE: ACUTE PAIN

## 2020-04-09 ASSESSMENT — PAIN DESCRIPTION - ORIENTATION: ORIENTATION: MID

## 2020-04-09 ASSESSMENT — PAIN DESCRIPTION - LOCATION: LOCATION: ABDOMEN

## 2020-04-09 NOTE — ED PROVIDER NOTES
[address] [facility] ED  EMERGENCY DEPARTMENT ENCOUNTER      Pt Name: Ricci Tiwari  MRN: [id number]  [place] 1970  Date of evaluation: 4/9/2020  Provider: Juan David Bland       Chief Complaint   Patient presents with    Abdominal Pain     x 2 days, diarrhea         HISTORY OF PRESENT ILLNESS   (Location/Symptom, Timing/Onset,Context/Setting, Quality, Duration, Modifying Factors, Severity)  Note limiting factors. Ricci Tiwari is a 54 y.o. female who presents to the emergency department with complaints of abdominal pain over the course of the last 2 or 3 days. She admits to sporadic diarrhea with the last episode of diarrhea being yesterday. She reports lower abdominal cramping type sensation that was present again this morning. It is currently very mild. She reports that she does not want \"anything stronger narcotics for my pain. \"  She denies any fever, sweats chills change in appetite or oral intake. She tolerated food well before coming into the emergency department today. Location/Symptom -abdominal pain and diarrhea  Onset -2 3 days  Context/Setting - as above  Quality -aching cramping  Duration -2 3 days  Modifying Factors -none obvious  Severity -currently mild pain        Nursing Notes were reviewed. REVIEW OF SYSTEMS    (2-9 systems for level 4, 10 or more for level 5)     Review of Systems   Constitutional: Negative for chills, diaphoresis, fatigue and fever. HENT: Negative for congestion, rhinorrhea and sore throat. Eyes: Negative for photophobia and pain. Respiratory: Negative for cough and shortness of breath. Cardiovascular: Negative for chest pain and palpitations. Gastrointestinal: Positive for abdominal pain and diarrhea. Negative for nausea and vomiting. Genitourinary: Negative for dysuria and flank pain. Musculoskeletal: Negative for back pain. Skin: Negative for rash.    Neurological: Negative for dizziness, organizations: None     Relationship status: None    Intimate partner violence     Fear of current or ex partner: None     Emotionally abused: None     Physically abused: None     Forced sexual activity: None   Other Topics Concern    None   Social History Narrative    None       SCREENINGS             PHYSICAL EXAM    (up to 7 for level 4, 8 or more for level 5)     ED Triage Vitals   BP Temp Temp Source Pulse Resp SpO2 Height Weight   04/09/20 0900 04/09/20 0806 04/09/20 0806 04/09/20 0806 04/09/20 0806 04/09/20 0806 04/09/20 0806 04/09/20 0806   (!) 163/99 99 °F (37.2 °C) Oral 120 16 99 % 5' 4\" (1.626 m) 198 lb (89.8 kg)       Physical Exam  Vitals signs and nursing note reviewed. Constitutional:       General: She is not in acute distress. Appearance: Normal appearance. She is well-developed. She is not diaphoretic. HENT:      Head: Normocephalic and atraumatic. Eyes:      General: Lids are normal.      Conjunctiva/sclera: Conjunctivae normal.   Neck:      Musculoskeletal: Normal range of motion and neck supple. Cardiovascular:      Rate and Rhythm: Normal rate and regular rhythm. Pulses: Normal pulses. Heart sounds: Normal heart sounds. Pulmonary:      Effort: Pulmonary effort is normal.      Breath sounds: Normal breath sounds. Abdominal:      General: Bowel sounds are normal.      Palpations: Abdomen is soft. Tenderness: There is abdominal tenderness in the right lower quadrant, periumbilical area, suprapubic area and left lower quadrant. There is no guarding or rebound. Lymphadenopathy:      Cervical: No cervical adenopathy. Skin:     General: Skin is warm and dry. Capillary Refill: Capillary refill takes less than 2 seconds. Findings: No rash. Neurological:      Mental Status: She is alert and oriented to person, place, and time. Psychiatric:         Thought Content: Thought content normal.         Judgment: Judgment normal.         RESULTS     EKG:  All Temp: 99 °F (37.2 °C)      TempSrc: Oral      SpO2: 99% 100% 100% 100%   Weight: 198 lb (89.8 kg)      Height: 5' 4\" (1.626 m)               MDM patient reexamined. Abdomen soft with a slight amount of generalized tenderness more noted in the lower abdomen. There is no rebound rigidity guarding or any peritoneal signs. She has been multiple repeated requests for food. She is provided food following the completion and review of a normal CT scan. Laboratory studies did not demonstrate acute pathology. She does have a longstanding history of type 2 diabetes. She is not in DKA. The patient reports that she feels significantly improved and wishes for discharge. Have provided extended discharge instructions to the patient including signs, symptoms and red flags of which to return to the emergency department. they express good understanding of these instructions. Standard anticipatory guidance given to patient upon discharge. Have given them a specific time frame in which to follow-up and who to follow-up with. I have also advised them that they should return to the emergency department if they get worse, or not getting better or develop any new or concerning symptoms. Patient demonstrates understanding and all questions were answered. CRITICAL CARE TIME       CONSULTS:  None    PROCEDURES:  Unless otherwise noted below, none     Procedures    FINAL IMPRESSION      1. Lower abdominal pain    2.  Diarrhea, unspecified type          DISPOSITION/PLAN   DISPOSITION Decision To Discharge 04/09/2020 11:04:13 AM      PATIENT REFERRED TO:  Cristiano Pedraza MD  [address]  [phone]    Call in 1 day  For continued evaluation and management      DISCHARGE MEDICATIONS:  New Prescriptions    IBUPROFEN (ADVIL;MOTRIN) 800 MG TABLET    Take 1 tablet by mouth every 8 hours as needed for Pain          (Please notethat portions of this note were completed with a voice recognition program. Efforts were made to edit the dictations but occasionally words are mis-transcribed.)    VIOLETTE Cadena CNP (electronically signed)  Attending Emergency Physician         VIOLETTE Cadena CNP  04/09/20 6536

## 2020-04-10 ENCOUNTER — CARE COORDINATION (OUTPATIENT)
Dept: CARE COORDINATION | Age: 50
End: 2020-04-10

## 2020-04-11 LAB — URINE CULTURE, ROUTINE: NORMAL

## 2020-07-18 ENCOUNTER — HOSPITAL ENCOUNTER (EMERGENCY)
Age: 50
Discharge: HOME | End: 2020-07-18
Attending: EMERGENCY MEDICINE
Payer: COMMERCIAL

## 2020-07-18 ENCOUNTER — APPOINTMENT (OUTPATIENT)
Dept: GENERAL RADIOLOGY | Age: 50
End: 2020-07-18
Payer: COMMERCIAL

## 2020-07-18 VITALS
WEIGHT: 199 LBS | HEIGHT: 64 IN | DIASTOLIC BLOOD PRESSURE: 101 MMHG | BODY MASS INDEX: 33.97 KG/M2 | HEART RATE: 96 BPM | RESPIRATION RATE: 18 BRPM | SYSTOLIC BLOOD PRESSURE: 177 MMHG | OXYGEN SATURATION: 100 % | TEMPERATURE: 98.1 F

## 2020-07-18 PROCEDURE — 6360000002 HC RX W HCPCS: Performed by: EMERGENCY MEDICINE

## 2020-07-18 PROCEDURE — 94640 AIRWAY INHALATION TREATMENT: CPT

## 2020-07-18 PROCEDURE — 99283 EMERGENCY DEPT VISIT LOW MDM: CPT

## 2020-07-18 PROCEDURE — 71046 X-RAY EXAM CHEST 2 VIEWS: CPT

## 2020-07-18 RX ORDER — BENZONATATE 100 MG/1
100-200 CAPSULE ORAL 3 TIMES DAILY PRN
Qty: 60 CAPSULE | Refills: 0 | Status: SHIPPED | OUTPATIENT
Start: 2020-07-18 | End: 2020-07-28

## 2020-07-18 RX ORDER — CETIRIZINE HYDROCHLORIDE 10 MG/1
10 TABLET ORAL DAILY
Qty: 30 TABLET | Refills: 0 | Status: SHIPPED | OUTPATIENT
Start: 2020-07-18

## 2020-07-18 RX ORDER — AZITHROMYCIN 250 MG/1
TABLET, FILM COATED ORAL
Qty: 1 PACKET | Refills: 0 | Status: SHIPPED | OUTPATIENT
Start: 2020-07-18 | End: 2020-07-22

## 2020-07-18 RX ORDER — ALBUTEROL SULFATE 90 UG/1
2 AEROSOL, METERED RESPIRATORY (INHALATION) 4 TIMES DAILY PRN
Qty: 3 INHALER | Refills: 1 | Status: SHIPPED | OUTPATIENT
Start: 2020-07-18

## 2020-07-18 RX ORDER — ALBUTEROL SULFATE 2.5 MG/3ML
2.5 SOLUTION RESPIRATORY (INHALATION) ONCE
Status: COMPLETED | OUTPATIENT
Start: 2020-07-18 | End: 2020-07-18

## 2020-07-18 RX ADMIN — ALBUTEROL SULFATE 2.5 MG: 2.5 SOLUTION RESPIRATORY (INHALATION) at 08:27

## 2020-07-18 ASSESSMENT — ENCOUNTER SYMPTOMS
SHORTNESS OF BREATH: 0
SORE THROAT: 0
NAUSEA: 0
DIARRHEA: 0
BACK PAIN: 0
COUGH: 1
ABDOMINAL PAIN: 0
VOMITING: 0

## 2020-07-18 NOTE — ED TRIAGE NOTES
Patient presents to the ER with complaints of cough for the past week. Patient states cough is dry. Denies shortness of breath. Denies fever. Denies chest pain. Patients BP stating 181/113 but patient states that there is no way that is her blood pressure and that she will not allow me to log it into the computer.

## 2020-07-18 NOTE — ED PROVIDER NOTES
[address] [facility] ED  eMERGENCYdEPARTMENT eNCOUnter      Pt Name: Winnie Castaneda  MRN: [id number]  [place] 1970  Date of evaluation: 7/18/2020  Silvio Feng MD    CHIEF COMPLAINT           HPI  Winnie Castaneda is a 52 y.o. female per chart review has a h/o DM II, HTN, hpl presents to the ED with cough x 10 days. Pt notes productive cough with yellow sputum x 10 days. Sometimes the cough is yellow and sometimes clear. Pt has tried allergy medicine without any relief. Pt denies fever, n/v, cp, sob, dysuria, diarrhea. ROS  Review of Systems   Constitutional: Negative for activity change, chills and fever. HENT: Negative for ear pain and sore throat. Eyes: Negative for visual disturbance. Respiratory: Positive for cough. Negative for shortness of breath. Cardiovascular: Negative for chest pain, palpitations and leg swelling. Gastrointestinal: Negative for abdominal pain, diarrhea, nausea and vomiting. Genitourinary: Negative for dysuria. Musculoskeletal: Negative for back pain. Skin: Negative for rash. Neurological: Negative for dizziness and weakness. Except as noted above the remainder of the review of systems was reviewed and negative. PAST MEDICAL HISTORY     Past Medical History:   Diagnosis Date    Bilateral chronic otitis media August 2015    DMII (diabetes mellitus, type 2) ([address] 2008    Fibroid, uterus     History of medication noncompliance     HTN (hypertension) 2009    Obesity (BMI 30-39. 9)          SURGICAL HISTORY       Past Surgical History:   Procedure Laterality Date    HYSTERECTOMY  2009    fibroids, Dr Sol Torres, ovaries intact    MYRINGOTOMY AND TYMPANOSTOMY TUBE PLACEMENT Bilateral 8/13/2015    Dr. Adria Ambrosio       Previous Medications    BLOOD GLUCOSE MONITORING SUPPL (FREESTYLE LITE) YULIYA    1 Device by Does not apply route daily as needed    FREESTYLE LANCETS MISC    1 each by Does not apply route daily    FREESTYLE LITE STRIP    TEST TWICE DAILY    HYDRALAZINE (APRESOLINE) 25 MG TABLET    TAKE 2 TABLETS BY MOUTH TWICE DAILY    IBUPROFEN (ADVIL;MOTRIN) 800 MG TABLET    Take 1 tablet by mouth every 8 hours as needed for Pain    LORATADINE-PSEUDOEPHEDRINE (LORATADINE-D 24HR PO)    Take by mouth    LOSARTAN-HYDROCHLOROTHIAZIDE (HYZAAR) 100-25 MG PER TABLET    TAKE 1 TABLET BY MOUTH DAILY    METFORMIN (GLUCOPHAGE XR) 500 MG EXTENDED RELEASE TABLET    2 po bid    METOPROLOL SUCCINATE (TOPROL XL) 100 MG EXTENDED RELEASE TABLET    TAKE 1 TABLET BY MOUTH DAILY    MULTIPLE VITAMINS-MINERALS (MULTIVITAMIN ADULT PO)    Take 1 tablet by mouth daily    SITAGLIPTIN (JANUVIA) 100 MG TABLET    TAKE 1 TABLET BY MOUTH DAILY       ALLERGIES     Influenza vaccines    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    Occupation: [employer]      Comment: [employer] full time agency   Social Needs    Financial resource strain: None    Food insecurity     Worry: None     Inability: None    Transportation needs     Medical: None     Non-medical: None   Tobacco Use    Smoking status: Former Smoker     Packs/day: 1.00     Years: 15.00     Pack years: 15.00     Types: Cigarettes     Last attempt to quit: 2/14/2005     Years since quitting: 15.4    Smokeless tobacco: Current User     Types: Chew   Substance and Sexual Activity    Alcohol use:  Yes     Alcohol/week: 0.0 standard drinks     Comment: occasional wine consumption not to excess    Drug use: No    Sexual activity: None   Lifestyle    Physical activity     Days per week: None     Minutes per session: None    Stress: None   Relationships    Social connections     Talks on phone: None     Gets together: None     Attends [employer] service: None     Active member of club or organization: None     Attends meetings of clubs or organizations: None     Relationship status: None    Intimate partner violence     Fear of current or ex partner: None     Emotionally abused: None     Physically abused: None     Forced sexual activity: None   Other Topics Concern    None   Social History Narrative    None         PHYSICAL EXAM       ED Triage Vitals   BP Temp Temp Source Pulse Resp SpO2 Height Weight   07/18/20 0751 07/18/20 0742 07/18/20 0742 07/18/20 0742 07/18/20 0742 07/18/20 0742 07/18/20 0742 07/18/20 0742   (!) 177/101 98.1 °F (36.7 °C) Oral 89 16 99 % 5' 4\" (1.626 m) 199 lb (90.3 kg)       Physical Exam  Vitals signs and nursing note reviewed. Constitutional:       Appearance: She is well-developed. HENT:      Head: Normocephalic. Right Ear: External ear normal.      Left Ear: External ear normal.   Eyes:      Conjunctiva/sclera: Conjunctivae normal.      Pupils: Pupils are equal, round, and reactive to light. Neck:      Musculoskeletal: Normal range of motion and neck supple. Cardiovascular:      Rate and Rhythm: Normal rate and regular rhythm. Heart sounds: Normal heart sounds. Pulmonary:      Effort: Pulmonary effort is normal.      Breath sounds: Normal breath sounds. Abdominal:      General: Bowel sounds are normal. There is no distension. Palpations: Abdomen is soft. Tenderness: There is no abdominal tenderness. Musculoskeletal: Normal range of motion. Skin:     General: Skin is warm and dry. Neurological:      Mental Status: She is alert and oriented to person, place, and time. Psychiatric:         Mood and Affect: Mood normal.           MDM  53 yo female presents to the ED with cough. Pt is afebrile, hemodynamically stable. Pt given albuterol neb in the ED with mild relief. CXR negative.  Given longevity of cough will treat pt with abx. Pt's cough likely related to bronchitis vs seasonal allergies. Pt given prescription for azithromycin, albuterol, tessalon perles, zyrtec. Pt given cough and pneumonia warning signs and will f/u with pcp. FINAL IMPRESSION      1. Bronchitis    2.  Seasonal allergies          DISPOSITION/PLAN   DISPOSITION Decision To Discharge 07/18/2020 08:26:40 AM        DISCHARGE MEDICATIONS:  [unfilled]         Juana Luis MD(electronically signed)  Attending Emergency Physician            Juana Luis MD  07/18/20 [id number]

## 2020-07-20 ENCOUNTER — CARE COORDINATION (OUTPATIENT)
Dept: CARE COORDINATION | Age: 50
End: 2020-07-20

## 2020-07-21 ENCOUNTER — CARE COORDINATION (OUTPATIENT)
Dept: CARE COORDINATION | Age: 50
End: 2020-07-21

## 2020-10-12 ENCOUNTER — APPOINTMENT (OUTPATIENT)
Dept: GENERAL RADIOLOGY | Age: 50
End: 2020-10-12
Payer: COMMERCIAL

## 2020-10-12 ENCOUNTER — HOSPITAL ENCOUNTER (EMERGENCY)
Age: 50
Discharge: HOME | End: 2020-10-12
Payer: COMMERCIAL

## 2020-10-12 VITALS
RESPIRATION RATE: 20 BRPM | WEIGHT: 208 LBS | SYSTOLIC BLOOD PRESSURE: 206 MMHG | BODY MASS INDEX: 35.51 KG/M2 | TEMPERATURE: 98.5 F | HEART RATE: 98 BPM | OXYGEN SATURATION: 99 % | HEIGHT: 64 IN | DIASTOLIC BLOOD PRESSURE: 116 MMHG

## 2020-10-12 LAB
EKG ATRIAL RATE: 93 BPM
EKG P AXIS: 68 DEGREES
EKG P-R INTERVAL: 172 MS
EKG Q-T INTERVAL: 370 MS
EKG QRS DURATION: 86 MS
EKG QTC CALCULATION (BAZETT): 460 MS
EKG R AXIS: 74 DEGREES
EKG T AXIS: 52 DEGREES
EKG VENTRICULAR RATE: 93 BPM

## 2020-10-12 PROCEDURE — 93005 ELECTROCARDIOGRAM TRACING: CPT | Performed by: PERSONAL EMERGENCY RESPONSE ATTENDANT

## 2020-10-12 PROCEDURE — 71045 X-RAY EXAM CHEST 1 VIEW: CPT

## 2020-10-12 PROCEDURE — 99283 EMERGENCY DEPT VISIT LOW MDM: CPT

## 2020-10-12 RX ORDER — HYDRALAZINE HYDROCHLORIDE 50 MG/1
25 TABLET, FILM COATED ORAL EVERY 8 HOURS SCHEDULED
Status: DISCONTINUED | OUTPATIENT
Start: 2020-10-12 | End: 2020-10-12 | Stop reason: HOSPADM

## 2020-10-12 RX ORDER — HYDRALAZINE HYDROCHLORIDE 20 MG/ML
10 INJECTION INTRAMUSCULAR; INTRAVENOUS ONCE
Status: DISCONTINUED | OUTPATIENT
Start: 2020-10-12 | End: 2020-10-12

## 2020-10-12 ASSESSMENT — ENCOUNTER SYMPTOMS
RHINORRHEA: 0
ABDOMINAL DISTENTION: 0
CONSTIPATION: 0
SORE THROAT: 0
COUGH: 0
SHORTNESS OF BREATH: 0
VOMITING: 0
DIARRHEA: 0
ABDOMINAL PAIN: 0
EYE DISCHARGE: 0
BACK PAIN: 0
CHOKING: 0
COLOR CHANGE: 0

## 2020-10-12 ASSESSMENT — PAIN DESCRIPTION - LOCATION: LOCATION: CHEST

## 2020-10-12 ASSESSMENT — PAIN SCALES - GENERAL: PAINLEVEL_OUTOF10: 10

## 2020-10-12 ASSESSMENT — PAIN DESCRIPTION - DESCRIPTORS: DESCRIPTORS: SHARP

## 2020-10-12 ASSESSMENT — PAIN DESCRIPTION - PAIN TYPE: TYPE: ACUTE PAIN

## 2020-10-12 ASSESSMENT — PAIN DESCRIPTION - ORIENTATION: ORIENTATION: LEFT

## 2020-10-12 NOTE — ED PROVIDER NOTES
[address] [facility] ED  eMERGENCY dEPARTMENT eNCOUnter      Pt Name: Osmani Mathews  MRN: [id number]  [place] 1970  Date of evaluation: 10/12/2020  Provider: Nicko Sun PA-C    CHIEF COMPLAINT       Chief Complaint   Patient presents with    Chest Pain     started at 0500         HISTORY OF PRESENT ILLNESS   (Location/Symptom, Timing/Onset,Context/Setting, Quality, Duration, Modifying Factors, Severity)  Note limiting factors. Osmani Mathews is a 52 y.o. female who presents to the emergency department with complaint of left-sided chest pain which patient states started approximately 5 AM while she was getting ready to go to work. Patient states there was no pain earlier today, she states left side with no radiation, no shortness of breath, no cough, no fevers. She states she does have increased pain with movement. Patient denies any past cardiac history, states history hypertension diabetes. She states she is compliant with all of her medications, she has not taken her morning medications at this point. She is hypertensive on arrival to the ER. With complaint of left-sided chest pain with no radiation, she rates her current pain at this time as a 8 out of 10. She states she had a similar episode approximately 1 year ago which resolved itself in 1 hour, she did not follow-up. HPI    NursingNotes were reviewed. REVIEW OF SYSTEMS    (2-9 systems for level 4, 10 or more for level 5)     Review of Systems   Constitutional: Negative for activity change and appetite change. HENT: Negative for congestion, ear discharge, ear pain, nosebleeds, rhinorrhea and sore throat. Eyes: Negative for discharge. Respiratory: Negative for cough, choking and shortness of breath. Cardiovascular: Positive for chest pain. Negative for palpitations and leg swelling. Gastrointestinal: Negative for abdominal distention, abdominal pain, constipation, diarrhea and vomiting.    Genitourinary: Negative for difficulty urinating, dysuria and flank pain. Musculoskeletal: Negative for arthralgias, back pain and gait problem. Skin: Negative for color change, pallor, rash and wound. Neurological: Negative for dizziness, tremors, syncope, weakness, numbness and headaches. Psychiatric/Behavioral: Negative for agitation and confusion. Except as noted above the remainder of the review of systems was reviewed and negative. PAST MEDICAL HISTORY     Past Medical History:   Diagnosis Date    Bilateral chronic otitis media August 2015    DMII (diabetes mellitus, type 2) ([facility] [address] 2008    Fibroid, uterus     History of medication noncompliance     HTN (hypertension) 2009    Obesity (BMI 30-39. 9)          SURGICALHISTORY       Past Surgical History:   Procedure Laterality Date    HYSTERECTOMY  2009    fibroids, Dr Ade Rodríguez, ovaries intact    MYRINGOTOMY AND TYMPANOSTOMY TUBE PLACEMENT Bilateral 8/13/2015    Dr. Allan Villatoro       Discharge Medication List as of 10/12/2020  7:07 AM      CONTINUE these medications which have NOT CHANGED    Details   cetirizine (ZYRTEC) 10 MG tablet Take 1 tablet by mouth daily, Disp-30 tablet,R-0Print      albuterol sulfate HFA (VENTOLIN HFA) 108 (90 Base) MCG/ACT inhaler Inhale 2 puffs into the lungs 4 times daily as needed for Wheezing, Disp-3 Inhaler,R-1Print      ibuprofen (ADVIL;MOTRIN) 800 MG tablet Take 1 tablet by mouth every 8 hours as needed for Pain, Disp-20 tablet, R-0Print      FREESTYLE LITE strip TEST TWICE DAILY, Disp-100 strip, R-0Normal      metoprolol succinate (TOPROL XL) 100 MG extended release tablet TAKE 1 TABLET BY MOUTH DAILY, Disp-30 tablet, R-0Normal      hydrALAZINE (APRESOLINE) 25 MG tablet TAKE 2 TABLETS BY MOUTH TWICE DAILY, Disp-120 tablet, R-0Normal      losartan-hydrochlorothiazide (HYZAAR) 100-25 MG per tablet TAKE 1 TABLET BY MOUTH DAILY, Disp-30 tablet, R-1Normal      Loratadine-Pseudoephedrine (LORATADINE-D 24HR PO) Take by mouthHistorical Med      sitaGLIPtin (JANUVIA) 100 MG tablet TAKE 1 TABLET BY MOUTH DAILY, Disp-30 tablet, R-2      metFORMIN (GLUCOPHAGE XR) 500 MG extended release tablet 2 po bid, Disp-120 tablet, R-2      Blood Glucose Monitoring Suppl (FREESTYLE LITE) YULIYA DAILY PRN Starting 4/8/2016, Until Discontinued, Disp-1 Device, R-0, Normal      FREESTYLE LANCETS MISC DAILY Starting 4/8/2016, Until Discontinued, Disp-100 each, R-3, Normal      Multiple Vitamins-Minerals (MULTIVITAMIN ADULT PO) Take 1 tablet by mouth daily             ALLERGIES     Influenza vaccines    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    Occupation: [employer]      Comment: [employer] full time agency   Social Needs    Financial resource strain: None    Food insecurity     Worry: None     Inability: None    Transportation needs     Medical: None     Non-medical: None   Tobacco Use    Smoking status: Former Smoker     Packs/day: 1.00     Years: 15.00     Pack years: 15.00     Types: Cigarettes     Last attempt to quit: 2/14/2005     Years since quitting: 15.6    Smokeless tobacco: Current User     Types: Chew   Substance and Sexual Activity    Alcohol use:  Yes     Alcohol/week: 0.0 standard drinks     Comment: occasional wine consumption not to excess    Drug use: No    Sexual activity: None   Lifestyle    Physical activity     Days per week: None     Minutes per session: None    Stress: None   Relationships    Social connections     Talks on phone: None     Gets together: None     Attends [employer] service: None     Active member of club or organization: None     Attends Chest wall: No tenderness. Abdominal:      General: Abdomen is flat. Bowel sounds are normal. There is no distension or abdominal bruit. Palpations: Abdomen is soft. There is no shifting dullness, fluid wave, hepatomegaly, splenomegaly, mass or pulsatile mass. Tenderness: There is no abdominal tenderness. There is no right CVA tenderness, left CVA tenderness, guarding or rebound. Negative signs include Clancy's sign, Rovsing's sign and McBurney's sign. Musculoskeletal: Normal range of motion. General: No deformity. Skin:     General: Skin is warm and dry. Capillary Refill: Capillary refill takes less than 2 seconds. Coloration: Skin is not jaundiced. Neurological:      General: No focal deficit present. Mental Status: She is alert and oriented to person, place, and time. Mental status is at baseline. Cranial Nerves: No cranial nerve deficit. Sensory: No sensory deficit. Motor: No weakness. Coordination: Coordination normal.   Psychiatric:         Mood and Affect: Mood normal.         DIAGNOSTIC RESULTS     EKG: All EKG's are interpreted by the Emergency Department Physician who either signs or Co-signsthis chart in the absence of a cardiologist.    EKG shows normal sinus rhythm at 93 bpm no acute ST segment abnormality no ventricular ectopy QTC is 460 ms    RADIOLOGY:   Non-plain filmimages such as CT, Ultrasound and MRI are read by the radiologist. Plain radiographic images are visualized and preliminarily interpreted by the emergency physician with the below findings:    Chest x-ray shows no acute pulmonary process    Interpretation per the Radiologist below, if available at the time ofthis note:    XR CHEST PORTABLE   Final Result   NO ACUTE CARDIOPULMONARY ABNORMALITY. NO CHANGE.                ED BEDSIDE ULTRASOUND:   Performed by ED Physician - none    LABS:  Labs Reviewed   COMPREHENSIVE METABOLIC PANEL   CBC WITH AUTO DIFFERENTIAL   URINE DRUG SCREEN   ETHANOL   TROPONIN   URINE RT REFLEX TO CULTURE   CK   PROTIME-INR   APTT       All other labs were within normal range or not returned as of this dictation. EMERGENCY DEPARTMENT COURSE and DIFFERENTIAL DIAGNOSIS/MDM:   Vitals:    Vitals:    10/12/20 0549   BP: (!) 206/116   Pulse: 98   Resp: 20   Temp: 98.5 °F (36.9 °C)   TempSrc: Oral   SpO2: 99%   Weight: 208 lb (94.3 kg)   Height: 5' 4\" (1.626 m)          MDM  Number of Diagnoses or Management Options  Chest pain, unspecified type:   Essential hypertension:   Diagnosis management comments: Patient presented the ED with complaint of a left-sided chest pain which started at 5 AM this morning for her is been persistent, she is rating her current pain is an 8 out of 10. She is hypertensive, and states she has taken all of her medications as recommended. After initial evaluation, advised the patient I want to do a cardiac evaluation and give her blood pressure medication and medication for pain control. She declined blood pressure medication, she declined pain medication, and when the nurse went in to draw blood work she declined to have any blood work drawn on her. I advised the patient without providing any type of treatment, and without providing any type of diagnostic testing I was unable to determine the cause of her chest pain at this morning. Patient stated that she would accept oral blood pressure medication, she refused IV blood draw, she refused any further testing, at this time she is going to sign out [address] I advised her of her risks of not completing medical evaluation including that of potential for coronary artery disease, heart attack, causing permanent disability or death. Patient states she understands this, and does not want any further care. She was signed out [address] She was advised to contact her regular family physician for follow-up, or return to the emergency department.       CRITICAL CARE TIME   Total Critical Care time was 0 minutes, excluding separately reportableprocedures. There was a high probability of clinicallysignificant/life threatening deterioration in the patient's condition which required my urgent intervention. CONSULTS:  None    PROCEDURES:  Unless otherwise noted below, none     Procedures    FINAL IMPRESSION      1. Chest pain, unspecified type    2.  Essential hypertension          DISPOSITION/PLAN   DISPOSITION [place] 10/12/2020 06:29:31 AM      PATIENT REFERRED TO:  Jose Luis Costa MD  [address] [place] [id number]  Shari Platt MD  [address] [place] [address] [address]  [address] [facility]  [phone]    Today        DISCHARGE MEDICATIONS:  Discharge Medication List as of 10/12/2020  7:07 AM             (Please note that portions of this note were completed with a voice recognition program.  Efforts were made to edit the dictations but occasionally words are mis-transcribed.)    Isa Gustafson PA-C (electronically signed)  Attending Emergency Physician         Isa Gustafson PA-C  10/12/20 [facility]88 [facility]  10/12/20 [id number]

## 2020-10-12 NOTE — ED NOTES
Pt refused blood work and IV. Pt was given discharge instructions, educated about leaving AMA and told she was welcome to come back to the ER if cp continued. Pt has refused medication for BP.   Pt ambulated out of this ER in no distress     Mariana Murphy RN  10/12/20 [id number]

## 2020-10-13 PROCEDURE — 93010 ELECTROCARDIOGRAM REPORT: CPT | Performed by: INTERNAL MEDICINE

## 2020-11-03 PROBLEM — I10 HTN (HYPERTENSION): Status: RESOLVED | Noted: 2020-11-03 | Resolved: 2020-11-03

## 2020-11-03 PROBLEM — E11.9 DMII (DIABETES MELLITUS, TYPE 2) (HCC): Status: RESOLVED | Noted: 2020-11-03 | Resolved: 2020-11-03

## 2021-04-25 ENCOUNTER — HOSPITAL ENCOUNTER (EMERGENCY)
Age: 51
Discharge: HOME | End: 2021-04-25
Attending: EMERGENCY MEDICINE
Payer: COMMERCIAL

## 2021-04-25 VITALS
RESPIRATION RATE: 17 BRPM | DIASTOLIC BLOOD PRESSURE: 84 MMHG | SYSTOLIC BLOOD PRESSURE: 158 MMHG | HEIGHT: 64 IN | WEIGHT: 220 LBS | HEART RATE: 84 BPM | TEMPERATURE: 98.3 F | OXYGEN SATURATION: 98 % | BODY MASS INDEX: 37.56 KG/M2

## 2021-04-25 DIAGNOSIS — R11.2 NON-INTRACTABLE VOMITING WITH NAUSEA, UNSPECIFIED VOMITING TYPE: Primary | ICD-10-CM

## 2021-04-25 PROCEDURE — 99283 EMERGENCY DEPT VISIT LOW MDM: CPT

## 2021-04-25 PROCEDURE — 6370000000 HC RX 637 (ALT 250 FOR IP): Performed by: EMERGENCY MEDICINE

## 2021-04-25 RX ORDER — KETOROLAC TROMETHAMINE 30 MG/ML
30 INJECTION, SOLUTION INTRAMUSCULAR; INTRAVENOUS ONCE
Status: DISCONTINUED | OUTPATIENT
Start: 2021-04-25 | End: 2021-04-25

## 2021-04-25 RX ORDER — ONDANSETRON 4 MG/1
4 TABLET, ORALLY DISINTEGRATING ORAL ONCE
Status: COMPLETED | OUTPATIENT
Start: 2021-04-25 | End: 2021-04-25

## 2021-04-25 RX ORDER — 0.9 % SODIUM CHLORIDE 0.9 %
1000 INTRAVENOUS SOLUTION INTRAVENOUS ONCE
Status: DISCONTINUED | OUTPATIENT
Start: 2021-04-25 | End: 2021-04-25

## 2021-04-25 RX ORDER — ONDANSETRON 4 MG/1
4 TABLET, ORALLY DISINTEGRATING ORAL EVERY 8 HOURS PRN
Qty: 8 TABLET | Refills: 0 | Status: SHIPPED | OUTPATIENT
Start: 2021-04-25

## 2021-04-25 RX ORDER — ONDANSETRON 2 MG/ML
4 INJECTION INTRAMUSCULAR; INTRAVENOUS ONCE
Status: DISCONTINUED | OUTPATIENT
Start: 2021-04-25 | End: 2021-04-25

## 2021-04-25 RX ADMIN — ONDANSETRON 4 MG: 4 TABLET, ORALLY DISINTEGRATING ORAL at 03:31

## 2021-04-25 ASSESSMENT — ENCOUNTER SYMPTOMS
NAUSEA: 1
SORE THROAT: 0
WHEEZING: 0
DIARRHEA: 1
PHOTOPHOBIA: 0
ABDOMINAL DISTENTION: 0
ABDOMINAL PAIN: 1
EYE DISCHARGE: 0
CHEST TIGHTNESS: 0
COUGH: 0
VOMITING: 1
SHORTNESS OF BREATH: 0

## 2021-04-25 NOTE — ED TRIAGE NOTES
Pt reports nausea, vomiting, diarrhea x 12 hours. States she thinks she ate something bad for lunch yesterday. Denies abd pain, denies dysuria. Reports vomiting x 3 episodes and diarrhea x 3 episodes. States she tried drinking ginger ale and eating crackers with no relief.

## 2021-04-25 NOTE — ED PROVIDER NOTES
[address] [facility] ED  eMERGENCY dEPARTMENT eNCOUnter      Pt Name: Vijay Denney  MRN: [id number]  [place] 1970  Date of evaluation: 4/25/2021  Provider: Jared Murrell MD    CHIEF COMPLAINT       Chief Complaint   Patient presents with    Emesis         HISTORY OF PRESENT ILLNESS   (Location/Symptom, Timing/Onset,Context/Setting, Quality, Duration, Modifying Factors, Severity)  Note limiting factors. Vijay Denney is a 52 y.o. female who presents to the emergency department for evaluation of vomiting. Patient thinks that she got food poisoning from some coleslaw she ate roughly 12 hours ago. Within a couple hours of eating she became nauseated and vomited and then has had multiple episodes of vomiting and some diarrhea. She gets intermittent abdominal cramping that is generalized. No related fever. No related back pain. No chest pain or shortness of breath. HPI    NursingNotes were reviewed. REVIEW OF SYSTEMS    (2-9 systems for level 4, 10 or more for level 5)     Review of Systems   Constitutional: Negative for chills and diaphoresis. HENT: Negative for congestion, ear pain, mouth sores and sore throat. Eyes: Negative for photophobia and discharge. Respiratory: Negative for cough, chest tightness, shortness of breath and wheezing. Cardiovascular: Negative for chest pain and palpitations. Gastrointestinal: Positive for abdominal pain, diarrhea, nausea and vomiting. Negative for abdominal distention. Endocrine: Negative for cold intolerance. Genitourinary: Negative for difficulty urinating. Musculoskeletal: Negative for arthralgias. Skin: Negative for pallor and rash. Allergic/Immunologic: Negative for immunocompromised state. Neurological: Negative for dizziness and syncope. Hematological: Negative for adenopathy. Psychiatric/Behavioral: Negative for agitation and hallucinations. All other systems reviewed and are negative.       Except as noted above the remainder of the review of systems was reviewed and negative. PAST MEDICAL HISTORY     Past Medical History:   Diagnosis Date    Bilateral chronic otitis media August 2015    DMII (diabetes mellitus, type 2) ([address] 2008    Fibroid, uterus     History of medication noncompliance     HTN (hypertension) 2009    Obesity (BMI 30-39. 9)          SURGICALHISTORY       Past Surgical History:   Procedure Laterality Date    HYSTERECTOMY  2009    fibroids, Dr James Vigil, ovaries intact    MYRINGOTOMY AND TYMPANOSTOMY TUBE PLACEMENT Bilateral 8/13/2015    Dr. Joey Otto       Previous Medications    ALBUTEROL SULFATE HFA (VENTOLIN HFA) 108 (90 BASE) MCG/ACT INHALER    Inhale 2 puffs into the lungs 4 times daily as needed for Wheezing    BLOOD GLUCOSE MONITORING SUPPL (FREESTYLE LITE) YULIYA    1 Device by Does not apply route daily as needed    CETIRIZINE (ZYRTEC) 10 MG TABLET    Take 1 tablet by mouth daily    FREESTYLE LANCETS MISC    1 each by Does not apply route daily    FREESTYLE LITE STRIP    TEST TWICE DAILY    HYDRALAZINE (APRESOLINE) 25 MG TABLET    TAKE 2 TABLETS BY MOUTH TWICE DAILY    IBUPROFEN (ADVIL;MOTRIN) 800 MG TABLET    Take 1 tablet by mouth every 8 hours as needed for Pain    LORATADINE-PSEUDOEPHEDRINE (LORATADINE-D 24HR PO)    Take by mouth    LOSARTAN-HYDROCHLOROTHIAZIDE (HYZAAR) 100-25 MG PER TABLET    TAKE 1 TABLET BY MOUTH DAILY    METFORMIN (GLUCOPHAGE XR) 500 MG EXTENDED RELEASE TABLET    2 po bid    METOPROLOL SUCCINATE (TOPROL XL) 100 MG EXTENDED RELEASE TABLET    TAKE 1 TABLET BY MOUTH DAILY    MULTIPLE VITAMINS-MINERALS (MULTIVITAMIN ADULT PO)    Take 1 tablet by mouth daily    SITAGLIPTIN (JANUVIA) 100 MG TABLET    TAKE 1 TABLET BY MOUTH DAILY       ALLERGIES     Influenza vaccines    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: unemployed     Occupation: [employer]      Comment: [employer] full time agency   Social Needs    Financial resource strain: None    Food insecurity     Worry: None     Inability: None    Transportation needs     Medical: None     Non-medical: None   Tobacco Use    Smoking status: Former Smoker     Packs/day: 1.00     Years: 15.00     Pack years: 15.00     Types: Cigarettes     Quit date: 2005     Years since quittin.2    Smokeless tobacco: Current User     Types: Chew   Substance and Sexual Activity    Alcohol use: Yes     Alcohol/week: 0.0 standard drinks     Comment: occasional wine consumption not to excess    Drug use: No    Sexual activity: None   Lifestyle    Physical activity     Days per week: None     Minutes per session: None    Stress: None   Relationships    Social connections     Talks on phone: None     Gets together: None     Attends Voodoo service: None     Active member of club or organization: None     Attends meetings of clubs or organizations: None     Relationship status: None    Intimate partner violence     Fear of current or ex partner: None     Emotionally abused: None     Physically abused: None     Forced sexual activity: None   Other Topics Concern    None   Social History Narrative    None       SCREENINGS      @FLOW([id number])@      PHYSICAL EXAM    (up to 7 for level 4, 8 or more for level 5)     ED Triage Vitals [21 0247]   BP Temp Temp Source Pulse Resp SpO2 Height Weight   (!) 158/110 98.3 °F (36.8 °C) Oral 89 16 99 % 5' 4\" (1.626 m) 220 lb (99.8 kg)       Physical Exam  Vitals signs and nursing note reviewed. Constitutional:       Appearance: She is well-developed. HENT:      Head: Normocephalic.       Right Ear: Tympanic membrane normal.      Left Ear: Tympanic membrane normal.      Nose: Nose normal.      Mouth/Throat:      Mouth: Mucous membranes are moist.   Eyes:      Conjunctiva/sclera: Conjunctivae normal.      Pupils: Pupils are equal, round, and reactive to light. Neck:      Musculoskeletal: Normal range of motion and neck supple. Cardiovascular:      Rate and Rhythm: Normal rate and regular rhythm. Heart sounds: Normal heart sounds. Pulmonary:      Effort: Pulmonary effort is normal.      Breath sounds: Normal breath sounds. Abdominal:      General: Bowel sounds are normal.      Palpations: Abdomen is soft. Tenderness: There is no abdominal tenderness. There is no guarding. Musculoskeletal: Normal range of motion. Skin:     General: Skin is warm and dry. Capillary Refill: Capillary refill takes less than 2 seconds. Neurological:      Mental Status: She is alert and oriented to person, place, and time. Psychiatric:         Mood and Affect: Mood normal.         DIAGNOSTIC RESULTS     EKG: All EKG's are interpreted by the Emergency Department Physician who either signs or Co-signsthis chart in the absence of a cardiologist.      RADIOLOGY:   [place] such as CT, Ultrasound and MRI are read by the radiologist. Plain radiographic images are visualized and preliminarily interpreted by the emergency physician with the below findings:      Interpretation per the Radiologist below, if available at the time ofthis note:    No orders to display         ED BEDSIDE ULTRASOUND:   Performed by ED Physician - none    LABS:  Labs Reviewed   COMPREHENSIVE METABOLIC PANEL   CBC WITH AUTO DIFFERENTIAL   LACTIC ACID, PLASMA       All other labs were within normal range or not returned as of this dictation.     EMERGENCY DEPARTMENT COURSE and DIFFERENTIAL DIAGNOSIS/MDM:   Vitals:    Vitals:    04/25/21 0247   BP: (!) 158/110   Pulse: 89   Resp: 16   Temp: 98.3 °F (36.8 °C)   TempSrc: Oral   SpO2: 99%   Weight: 220 lb (99.8 kg) Height: 5' 4\" (1.626 m)          MDM patient refused labs and IV medications. She was given oral Zofran and is feeling better. I told her that symptoms seem to be consistent with that of food poisoning but I would definitely like to have further testing done based on the patient's age and her symptoms. She is going to leave against my medical advice. I am still prescribing her Zofran for home for symptoms. Return here for any consideration on recheck. CONSULTS:  None    PROCEDURES:  Unless otherwise noted below, none     Procedures    FINAL IMPRESSION      1.  Non-intractable vomiting with nausea, unspecified vomiting type          DISPOSITION/PLAN   DISPOSITION        PATIENT REFERRED TO:  Gavino Ybarra MD  [address] [facility]  [phone]    In 3 days        DISCHARGE MEDICATIONS:  New Prescriptions    ONDANSETRON (ZOFRAN ODT) 4 MG DISINTEGRATING TABLET    Take 1 tablet by mouth every 8 hours as needed for Nausea          (Please note that portions of this note were completed with a voice recognition program.  Efforts were made to edit the dictations but occasionally words are mis-transcribed.)    Mandeep Warren MD (electronically signed)  Attending Emergency Physician          Mandeep Warren MD  04/25/21 [id number]

## 2021-04-25 NOTE — ED NOTES
Patient states Zofran helped nausea. D/C instructions explained to patient who voices understanding. Patient is ambulatory from ED with no distress observed. Gait is upright and steady. Respirations are even and non-labored. Patient very thankful for care rendered.      Tennis Potters, RN  04/25/21 1293

## 2021-04-25 NOTE — ED NOTES
Unable to obtain IV access and blood for labwork by this nurse and Ally/SALVADOR. Patient is now refusing any further attempts and is agreeable to Kelly GAXIOLA. Dr. Joe Herzog informed and changed Zofran to MINOR.      Lali Marcano RN  04/25/21 0921

## 2023-03-13 ENCOUNTER — HOSPITAL ENCOUNTER (INPATIENT)
Age: 53
LOS: 4 days | Discharge: HOME | DRG: 710 | End: 2023-03-17
Attending: INTERNAL MEDICINE | Admitting: INTERNAL MEDICINE
Payer: COMMERCIAL

## 2023-03-13 ENCOUNTER — APPOINTMENT (OUTPATIENT)
Dept: CT IMAGING | Age: 53
DRG: 710 | End: 2023-03-13
Payer: COMMERCIAL

## 2023-03-13 DIAGNOSIS — K61.1 PERIRECTAL ABSCESS: Primary | ICD-10-CM

## 2023-03-13 PROBLEM — L02.91 ABSCESS: Status: ACTIVE | Noted: 2023-03-13

## 2023-03-13 LAB
ALBUMIN SERPL-MCNC: 3.8 G/DL (ref 3.5–4.6)
ALP BLD-CCNC: 184 U/L (ref 40–130)
ALT SERPL-CCNC: 15 U/L (ref 0–33)
ANION GAP SERPL CALCULATED.3IONS-SCNC: 18 MEQ/L (ref 9–15)
ANISOCYTOSIS: ABNORMAL
AST SERPL-CCNC: 10 U/L (ref 0–35)
ATYPICAL LYMPHOCYTE RELATIVE PERCENT: 4 %
BACTERIA: ABNORMAL /HPF
BASOPHILS ABSOLUTE: 0.1 K/UL (ref 0–0.2)
BASOPHILS RELATIVE PERCENT: 1 %
BILIRUB SERPL-MCNC: 0.4 MG/DL (ref 0.2–0.7)
BILIRUBIN URINE: NEGATIVE
BLOOD, URINE: NEGATIVE
BUN BLDV-MCNC: 10 MG/DL (ref 6–20)
CALCIUM SERPL-MCNC: 9.9 MG/DL (ref 8.5–9.9)
CHLORIDE BLD-SCNC: 95 MEQ/L (ref 95–107)
CLARITY: CLEAR
CO2: 20 MEQ/L (ref 20–31)
COLOR: YELLOW
CREAT SERPL-MCNC: 0.7 MG/DL (ref 0.5–0.9)
EOSINOPHILS ABSOLUTE: 0.7 K/UL (ref 0–0.7)
EOSINOPHILS RELATIVE PERCENT: 6 %
EPITHELIAL CELLS, UA: ABNORMAL /HPF (ref 0–5)
GFR SERPL CREATININE-BSD FRML MDRD: >60 ML/MIN/{1.73_M2}
GLOBULIN: 4.5 G/DL (ref 2.3–3.5)
GLUCOSE BLD-MCNC: 288 MG/DL (ref 70–99)
GLUCOSE BLD-MCNC: 315 MG/DL (ref 70–99)
GLUCOSE BLD-MCNC: 347 MG/DL (ref 70–99)
GLUCOSE URINE: >=1000 MG/DL
HCT VFR BLD CALC: 38.1 % (ref 37–47)
HEMOGLOBIN: 12.2 G/DL (ref 12–16)
HYALINE CASTS: ABNORMAL /HPF (ref 0–5)
KETONES, URINE: >=80 MG/DL
LACTIC ACID, SEPSIS: 1.5 MMOL/L (ref 0.5–1.9)
LACTIC ACID: 1.7 MMOL/L (ref 0.5–2.2)
LEUKOCYTE ESTERASE, URINE: NEGATIVE
LYMPHOCYTES ABSOLUTE: 2.7 K/UL (ref 1–4.8)
LYMPHOCYTES RELATIVE PERCENT: 20 %
MCH RBC QN AUTO: 22.8 PG (ref 27–31.3)
MCHC RBC AUTO-ENTMCNC: 32.1 % (ref 33–37)
MCV RBC AUTO: 71 FL (ref 79.4–94.8)
MICROCYTES: ABNORMAL
MONOCYTES ABSOLUTE: 0.3 K/UL (ref 0.2–0.8)
MONOCYTES RELATIVE PERCENT: 2.9 %
NEUTROPHILS ABSOLUTE: 7.4 K/UL (ref 1.4–6.5)
NEUTROPHILS RELATIVE PERCENT: 66 %
NITRITE, URINE: NEGATIVE
PDW BLD-RTO: 13.6 % (ref 11.5–14.5)
PERFORMED ON: ABNORMAL
PERFORMED ON: ABNORMAL
PH UA: 5.5 (ref 5–9)
PLATELET # BLD: 262 K/UL (ref 130–400)
PLATELET SLIDE REVIEW: NORMAL
POC CREATININE WHOLE BLOOD: 0.6
POTASSIUM SERPL-SCNC: 4.3 MEQ/L (ref 3.4–4.9)
PROCALCITONIN: <0.02 NG/ML (ref 0–0.15)
PROTEIN UA: 100 MG/DL
RBC # BLD: 5.37 M/UL (ref 4.2–5.4)
RBC UA: ABNORMAL /HPF (ref 0–5)
SODIUM BLD-SCNC: 133 MEQ/L (ref 135–144)
SPECIFIC GRAVITY UA: 1.03 (ref 1–1.03)
TOTAL PROTEIN: 8.3 G/DL (ref 6.3–8)
UROBILINOGEN, URINE: 0.2 E.U./DL
VACUOLATED NEUTROPHILS: ABNORMAL
WBC # BLD: 11.2 K/UL (ref 4.8–10.8)
WBC UA: ABNORMAL /HPF (ref 0–5)

## 2023-03-13 PROCEDURE — 2580000003 HC RX 258

## 2023-03-13 PROCEDURE — 99285 EMERGENCY DEPT VISIT HI MDM: CPT

## 2023-03-13 PROCEDURE — 83036 HEMOGLOBIN GLYCOSYLATED A1C: CPT

## 2023-03-13 PROCEDURE — 6360000002 HC RX W HCPCS

## 2023-03-13 PROCEDURE — 96374 THER/PROPH/DIAG INJ IV PUSH: CPT

## 2023-03-13 PROCEDURE — 83605 ASSAY OF LACTIC ACID: CPT

## 2023-03-13 PROCEDURE — 81001 URINALYSIS AUTO W/SCOPE: CPT

## 2023-03-13 PROCEDURE — 36415 COLL VENOUS BLD VENIPUNCTURE: CPT

## 2023-03-13 PROCEDURE — 74177 CT ABD & PELVIS W/CONTRAST: CPT

## 2023-03-13 PROCEDURE — 80053 COMPREHEN METABOLIC PANEL: CPT

## 2023-03-13 PROCEDURE — 84145 PROCALCITONIN (PCT): CPT

## 2023-03-13 PROCEDURE — 2580000003 HC RX 258: Performed by: INTERNAL MEDICINE

## 2023-03-13 PROCEDURE — 1210000000 HC MED SURG R&B

## 2023-03-13 PROCEDURE — 6360000004 HC RX CONTRAST MEDICATION

## 2023-03-13 PROCEDURE — 85025 COMPLETE CBC W/AUTO DIFF WBC: CPT

## 2023-03-13 PROCEDURE — 87040 BLOOD CULTURE FOR BACTERIA: CPT

## 2023-03-13 RX ORDER — POLYETHYLENE GLYCOL 3350 17 G/17G
17 POWDER, FOR SOLUTION ORAL DAILY PRN
Status: DISCONTINUED | OUTPATIENT
Start: 2023-03-13 | End: 2023-03-17 | Stop reason: HOSPADM

## 2023-03-13 RX ORDER — INSULIN LISPRO 100 [IU]/ML
0-8 INJECTION, SOLUTION INTRAVENOUS; SUBCUTANEOUS EVERY 4 HOURS
Status: DISCONTINUED | OUTPATIENT
Start: 2023-03-13 | End: 2023-03-14 | Stop reason: ALTCHOICE

## 2023-03-13 RX ORDER — METOPROLOL TARTRATE 50 MG/1
50 TABLET, FILM COATED ORAL 2 TIMES DAILY
COMMUNITY
Start: 2023-02-24

## 2023-03-13 RX ORDER — ACETAMINOPHEN 325 MG/1
650 TABLET ORAL EVERY 6 HOURS PRN
Status: DISCONTINUED | OUTPATIENT
Start: 2023-03-13 | End: 2023-03-17 | Stop reason: HOSPADM

## 2023-03-13 RX ORDER — SODIUM CHLORIDE 9 MG/ML
INJECTION, SOLUTION INTRAVENOUS CONTINUOUS
Status: DISCONTINUED | OUTPATIENT
Start: 2023-03-13 | End: 2023-03-14

## 2023-03-13 RX ORDER — ENOXAPARIN SODIUM 100 MG/ML
40 INJECTION SUBCUTANEOUS DAILY
Status: DISCONTINUED | OUTPATIENT
Start: 2023-03-14 | End: 2023-03-17 | Stop reason: HOSPADM

## 2023-03-13 RX ORDER — 0.9 % SODIUM CHLORIDE 0.9 %
1000 INTRAVENOUS SOLUTION INTRAVENOUS ONCE
Status: COMPLETED | OUTPATIENT
Start: 2023-03-13 | End: 2023-03-13

## 2023-03-13 RX ORDER — MORPHINE SULFATE 2 MG/ML
2 INJECTION, SOLUTION INTRAMUSCULAR; INTRAVENOUS ONCE
Status: COMPLETED | OUTPATIENT
Start: 2023-03-13 | End: 2023-03-13

## 2023-03-13 RX ORDER — SODIUM CHLORIDE, SODIUM LACTATE, POTASSIUM CHLORIDE, AND CALCIUM CHLORIDE .6; .31; .03; .02 G/100ML; G/100ML; G/100ML; G/100ML
1700 INJECTION, SOLUTION INTRAVENOUS ONCE
Status: COMPLETED | OUTPATIENT
Start: 2023-03-13 | End: 2023-03-14

## 2023-03-13 RX ORDER — VALSARTAN 320 MG/1
320 TABLET ORAL DAILY
COMMUNITY
Start: 2022-12-14

## 2023-03-13 RX ORDER — ONDANSETRON 4 MG/1
4 TABLET, ORALLY DISINTEGRATING ORAL EVERY 8 HOURS PRN
Status: DISCONTINUED | OUTPATIENT
Start: 2023-03-13 | End: 2023-03-17 | Stop reason: HOSPADM

## 2023-03-13 RX ORDER — KETOROLAC TROMETHAMINE 30 MG/ML
30 INJECTION, SOLUTION INTRAMUSCULAR; INTRAVENOUS EVERY 6 HOURS PRN
Status: DISPENSED | OUTPATIENT
Start: 2023-03-13 | End: 2023-03-14

## 2023-03-13 RX ORDER — DEXTROSE MONOHYDRATE 100 MG/ML
INJECTION, SOLUTION INTRAVENOUS CONTINUOUS PRN
Status: DISCONTINUED | OUTPATIENT
Start: 2023-03-13 | End: 2023-03-14 | Stop reason: SDUPTHER

## 2023-03-13 RX ORDER — PIOGLITAZONEHYDROCHLORIDE 45 MG/1
45 TABLET ORAL DAILY
Status: ON HOLD | COMMUNITY
End: 2023-03-17 | Stop reason: HOSPADM

## 2023-03-13 RX ORDER — ONDANSETRON 2 MG/ML
4 INJECTION INTRAMUSCULAR; INTRAVENOUS EVERY 6 HOURS PRN
Status: DISCONTINUED | OUTPATIENT
Start: 2023-03-13 | End: 2023-03-17 | Stop reason: HOSPADM

## 2023-03-13 RX ORDER — ACETAMINOPHEN 650 MG/1
650 SUPPOSITORY RECTAL EVERY 6 HOURS PRN
Status: DISCONTINUED | OUTPATIENT
Start: 2023-03-13 | End: 2023-03-17 | Stop reason: HOSPADM

## 2023-03-13 RX ORDER — GLIMEPIRIDE 2 MG/1
2 TABLET ORAL 2 TIMES DAILY WITH MEALS
Status: ON HOLD | COMMUNITY
Start: 2022-12-13 | End: 2023-03-17 | Stop reason: HOSPADM

## 2023-03-13 RX ORDER — 0.9 % SODIUM CHLORIDE 0.9 %
1000 INTRAVENOUS SOLUTION INTRAVENOUS ONCE
Status: COMPLETED | OUTPATIENT
Start: 2023-03-13 | End: 2023-03-14

## 2023-03-13 RX ORDER — HYDRALAZINE HYDROCHLORIDE 20 MG/ML
10 INJECTION INTRAMUSCULAR; INTRAVENOUS EVERY 4 HOURS PRN
Status: DISCONTINUED | OUTPATIENT
Start: 2023-03-13 | End: 2023-03-17 | Stop reason: HOSPADM

## 2023-03-13 RX ADMIN — SODIUM CHLORIDE 1000 ML: 9 INJECTION, SOLUTION INTRAVENOUS at 23:15

## 2023-03-13 RX ADMIN — PIPERACILLIN AND TAZOBACTAM 3375 MG: 3; .375 INJECTION, POWDER, FOR SOLUTION INTRAVENOUS at 20:34

## 2023-03-13 RX ADMIN — MORPHINE SULFATE 2 MG: 2 INJECTION, SOLUTION INTRAMUSCULAR; INTRAVENOUS at 17:13

## 2023-03-13 RX ADMIN — IOPAMIDOL 50 ML: 612 INJECTION, SOLUTION INTRAVENOUS at 18:59

## 2023-03-13 RX ADMIN — SODIUM CHLORIDE 1000 ML: 9 INJECTION, SOLUTION INTRAVENOUS at 17:11

## 2023-03-13 ASSESSMENT — PAIN SCALES - GENERAL
PAINLEVEL_OUTOF10: 10
PAINLEVEL_OUTOF10: 10
PAINLEVEL_OUTOF10: 8
PAINLEVEL_OUTOF10: 7

## 2023-03-13 ASSESSMENT — ENCOUNTER SYMPTOMS
RHINORRHEA: 0
BACK PAIN: 0
VOMITING: 0
ROS SKIN COMMENTS: ABSCESS TO LEFT BUTTOCK
TROUBLE SWALLOWING: 0
NAUSEA: 0
SORE THROAT: 0
ABDOMINAL PAIN: 0
PHOTOPHOBIA: 0
DIARRHEA: 0
CHEST TIGHTNESS: 0
SHORTNESS OF BREATH: 0
ABDOMINAL DISTENTION: 0
COUGH: 0
COLOR CHANGE: 0
CONSTIPATION: 0

## 2023-03-13 ASSESSMENT — PAIN - FUNCTIONAL ASSESSMENT
PAIN_FUNCTIONAL_ASSESSMENT: PREVENTS OR INTERFERES SOME ACTIVE ACTIVITIES AND ADLS
PAIN_FUNCTIONAL_ASSESSMENT: 0-10

## 2023-03-13 ASSESSMENT — PAIN DESCRIPTION - FREQUENCY
FREQUENCY: CONTINUOUS
FREQUENCY: CONTINUOUS

## 2023-03-13 ASSESSMENT — PAIN DESCRIPTION - PAIN TYPE
TYPE: ACUTE PAIN
TYPE: ACUTE PAIN

## 2023-03-13 ASSESSMENT — PAIN DESCRIPTION - ORIENTATION: ORIENTATION: RIGHT

## 2023-03-13 ASSESSMENT — PAIN DESCRIPTION - DESCRIPTORS
DESCRIPTORS: THROBBING;SHARP
DESCRIPTORS: ACHING;PRESSURE;SHARP
DESCRIPTORS: PRESSURE

## 2023-03-13 ASSESSMENT — LIFESTYLE VARIABLES
HOW OFTEN DO YOU HAVE A DRINK CONTAINING ALCOHOL: NEVER
HOW OFTEN DO YOU HAVE A DRINK CONTAINING ALCOHOL: NEVER

## 2023-03-13 ASSESSMENT — PAIN DESCRIPTION - LOCATION
LOCATION: BUTTOCKS
LOCATION: RECTUM
LOCATION: BUTTOCKS;VAGINA
LOCATION: BUTTOCKS

## 2023-03-13 ASSESSMENT — PAIN DESCRIPTION - ONSET: ONSET: ON-GOING

## 2023-03-13 NOTE — ED PROVIDER NOTES
[address]  eMERGENCY dEPARTMENT eNCOUnter      Pt Name: Dariela Waterman  MRN: [id number]  [place] 1970  Date of evaluation: 3/13/2023  Provider: Donavon Story, [address] is a 50 y.o. female per chart review has ah/o T2DM, HTN. Patient presents to the emergency department for an abscess that she first noted on Thursday, 5 days ago. States it started perirectally and she feels that extending toward her posterior vaginal region. Patient states history of abscesses in the past but none to this extent. Prior abscesses have been at her armpit. She does have diabetes and admits poor control of her blood sugar. Patient states recent pain with urination. No hematuria, decreased urination, abnormal vaginal discharge or bleeding, abdominal pain, nausea, vomiting. States she has had fever/chills episodes at home. REVIEW OF SYSTEMS       Review of Systems   Constitutional:  Positive for chills and fever. HENT:  Negative for congestion. Eyes:  Negative for photophobia. Respiratory:  Negative for cough and shortness of breath. Cardiovascular:  Negative for chest pain. Gastrointestinal:  Negative for abdominal distention, abdominal pain, diarrhea, nausea and vomiting. Genitourinary:  Positive for dysuria and vaginal pain. Negative for decreased urine volume, difficulty urinating, hematuria, pelvic pain, vaginal bleeding and vaginal discharge. Musculoskeletal:  Negative for myalgias. Neurological:  Negative for headaches. Psychiatric/Behavioral:  Negative for confusion. Except as noted above the remainder of the review of systems was reviewed and negative.        PAST MEDICAL HISTORY     Past Medical History:   Diagnosis Date    Bilateral chronic otitis media August 2015    DMII (diabetes mellitus, type 2) [address] [address] 2008    Fibroid, uterus     History of medication noncompliance     HTN (hypertension) 2009    Obesity (BMI 30-39. 9)          SURGICAL HISTORY       Past Surgical History:   Procedure Laterality Date    HYSTERECTOMY (CERVIX STATUS UNKNOWN)  2009    fibroids, Dr Negro Colon, ovaries intact    MYRINGOTOMY AND TYMPANOSTOMY TUBE PLACEMENT Bilateral 8/13/2015    Dr. Junior Ramirez       Current Discharge Medication List        CONTINUE these medications which have NOT CHANGED    Details   ondansetron (ZOFRAN ODT) 4 MG disintegrating tablet Take 1 tablet by mouth every 8 hours as needed for Nausea  Qty: 8 tablet, Refills: 0      cetirizine (ZYRTEC) 10 MG tablet Take 1 tablet by mouth daily  Qty: 30 tablet, Refills: 0      albuterol sulfate HFA (VENTOLIN HFA) 108 (90 Base) MCG/ACT inhaler Inhale 2 puffs into the lungs 4 times daily as needed for Wheezing  Qty: 3 Inhaler, Refills: 1      ibuprofen (ADVIL;MOTRIN) 800 MG tablet Take 1 tablet by mouth every 8 hours as needed for Pain  Qty: 20 tablet, Refills: 0      FREESTYLE LITE strip TEST TWICE DAILY  Qty: 100 strip, Refills: 0    Associated Diagnoses: Type II diabetes mellitus, uncontrolled      metoprolol succinate (TOPROL XL) 100 MG extended release tablet TAKE 1 TABLET BY MOUTH DAILY  Qty: 30 tablet, Refills: 0    Associated Diagnoses: Essential hypertension, benign      hydrALAZINE (APRESOLINE) 25 MG tablet TAKE 2 TABLETS BY MOUTH TWICE DAILY  Qty: 120 tablet, Refills: 0    Associated Diagnoses: Essential hypertension, benign      losartan-hydrochlorothiazide (HYZAAR) 100-25 MG per tablet TAKE 1 TABLET BY MOUTH DAILY  Qty: 30 tablet, Refills: 1    Associated Diagnoses: Essential hypertension, benign      Loratadine-Pseudoephedrine (LORATADINE-D 24HR PO) Take by mouth      sitaGLIPtin (JANUVIA) 100 MG tablet TAKE 1 TABLET BY MOUTH DAILY  Qty: 30 tablet, Refills: 2    Associated Diagnoses: Uncontrolled type 2 diabetes mellitus with complication, without long-term current use of insulin      metFORMIN (GLUCOPHAGE XR) 500 MG extended release tablet 2 po bid  Qty: 120 tablet, Refills: 2    Associated Diagnoses: Uncontrolled type 2 diabetes mellitus with complication, without long-term current use of insulin      Blood Glucose Monitoring Suppl (FREESTYLE LITE) YULIYA 1 Device by Does not apply route daily as needed  Qty: 1 Device, Refills: 0    Associated Diagnoses: Type II diabetes mellitus, uncontrolled      FREESTYLE LANCETS MISC 1 each by Does not apply route daily  Qty: 100 each, Refills: 3    Associated Diagnoses: Type II diabetes mellitus, uncontrolled      Multiple Vitamins-Minerals (MULTIVITAMIN ADULT PO) Take 1 tablet by mouth daily             ALLERGIES     Influenza vaccines    FAMILY HISTORY       Family History   Problem Relation Age of Onset    Diabetes Mother           SOCIAL HISTORY       Social History     Socioeconomic History    Marital status: Single     Spouse name: None    Number of children: 2    Years of education: 13.5    Highest education level: None   Occupational History    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1 °F (37.3 °C) Oral (!) 136 20 100 % 5' 4\" (1.626 m) 198 lb (89.8 kg)       Physical Exam  Constitutional:       General: She is not in acute distress. Appearance: Normal appearance. She is not ill-appearing, toxic-appearing or diaphoretic. HENT:      Head: Normocephalic and atraumatic.       Right Ear: External ear normal.      Left Ear: External ear normal.      Nose: Nose normal.      Mouth/Throat:      Mouth: Mucous membranes are moist.      Pharynx: Oropharynx is clear. Eyes:      Extraocular Movements: Extraocular movements intact. Cardiovascular:      Rate and Rhythm: Normal rate and regular rhythm. Pulmonary:      Effort: Pulmonary effort is normal. No respiratory distress. Breath sounds: Normal breath sounds. Abdominal:      General: Bowel sounds are normal. There is no distension. Palpations: Abdomen is soft. There is no mass. Tenderness: There is no abdominal tenderness. There is no guarding or rebound. Hernia: No hernia is present. Genitourinary:      Musculoskeletal:         General: Normal range of motion. Cervical back: Normal range of motion. Skin:     General: Skin is warm. Neurological:      Mental Status: She is alert and oriented to person, place, and time.    Psychiatric:         Mood and Affect: Mood normal.         Behavior: Behavior normal.         LABS:  Labs Reviewed   CBC WITH AUTO DIFFERENTIAL - Abnormal; Notable for the following components:       Result Value    WBC 11.2 (*)     MCV 71.0 (*)     MCH 22.8 (*)     MCHC 32.1 (*)     Neutrophils Absolute 7.4 (*)     All other components within normal limits   COMPREHENSIVE METABOLIC PANEL - Abnormal; Notable for the following components:    Sodium 133 (*)     Anion Gap 18 (*)     Glucose 288 (*)     Total Protein 8.3 (*)     Alkaline Phosphatase 184 (*)     Globulin 4.5 (*)     All other components within normal limits   URINALYSIS - Abnormal; Notable for the following components:    Glucose, Ur >=1000 (*)     Ketones, Urine >=80 (*)     Protein,  (*)     All other components within normal limits   MICROSCOPIC URINALYSIS - Abnormal; Notable for the following components:    Bacteria, UA RARE (*)     WBC, UA 10-20 (*)     All other components within normal limits   POCT CREATININE - Normal LACTIC ACID   PROCALCITONIN         MDM:   Vitals:    Vitals:    03/13/23 1625 03/13/23 1718 03/13/23 1951   BP: (!) 168/105 (!) 157/100 (!) 164/85   Pulse: (!) 136 (!) 116 100   Resp: 20 19 18   Temp: 99.1 °F (37.3 °C)  100.1 °F (37.8 °C)   TempSrc: Oral  Oral   SpO2: 100% 99% 98%   Weight: 198 lb (89.8 kg)     Height: 5' 4\" (1.626 m)         59-year-old female patient presents to the emergency department for perirectal abscess first noted 5 days ago. Denies history of similar abscesses. Denies systemic complaints. Presents afebrile. Tachycardic to the 130s. Patient given IV NS bolus, IV morphine. Tachycardia resolves. Patient on exam has indurated area to left perirectal/perianal region without palpable subcutaneous gas or extension to the vulva. To evaluate further CT A/P is performed and demonstrates 5.0 x 2.6 cm left perirectal/perianal abscess. Patient with slight leukocytosis at 11.2. Discussed with general surgeon Dr. Marland Apley, recommends n.p.o. will evaluate in the morning. Patient initiated on IV Zosyn. Admitting to Dr. Amairani Farrar. CRITICAL CARE TIME   Total CriticalCare time was 0 minutes, excluding separately reportable procedures. There was a high probability of clinically significant/life threatening deterioration in the patient's condition which required my urgent intervention. PROCEDURES:  Unlessotherwise noted below, none      Procedures      FINAL IMPRESSION      1.  Perirectal abscess          DISPOSITION/PLAN   DISPOSITION Admitted 03/13/2023 08:19:04 PM          RADHA Flores (electronically signed)  Attending Emergency Physician          Jason Flores  03/13/23 6632

## 2023-03-13 NOTE — ED TRIAGE NOTES
Pt states that she has an abscess in her rectal/ vaginal area. Pt states it has been there for a week but now it is severe and she is unable to sit.

## 2023-03-14 ENCOUNTER — ANESTHESIA EVENT (OUTPATIENT)
Dept: OPERATING ROOM | Age: 53
End: 2023-03-14
Payer: COMMERCIAL

## 2023-03-14 ENCOUNTER — ANESTHESIA (OUTPATIENT)
Dept: OPERATING ROOM | Age: 53
End: 2023-03-14
Payer: COMMERCIAL

## 2023-03-14 PROBLEM — E08.65 DIABETES MELLITUS DUE TO UNDERLYING CONDITION, UNCONTROLLED, WITH HYPERGLYCEMIA (HCC): Status: ACTIVE | Noted: 2023-03-14

## 2023-03-14 PROBLEM — K61.1 PERIRECTAL ABSCESS: Status: ACTIVE | Noted: 2023-03-14

## 2023-03-14 PROBLEM — D72.829 LEUKOCYTOSIS: Status: ACTIVE | Noted: 2023-03-14

## 2023-03-14 LAB
ANION GAP SERPL CALCULATED.3IONS-SCNC: 20 MEQ/L (ref 9–15)
BASOPHILS ABSOLUTE: 0.1 K/UL (ref 0–0.2)
BASOPHILS RELATIVE PERCENT: 0.8 %
BUN BLDV-MCNC: 7 MG/DL (ref 6–20)
CALCIUM SERPL-MCNC: 9.2 MG/DL (ref 8.5–9.9)
CHLORIDE BLD-SCNC: 95 MEQ/L (ref 95–107)
CO2: 16 MEQ/L (ref 20–31)
CREAT SERPL-MCNC: 0.68 MG/DL (ref 0.5–0.9)
EOSINOPHILS ABSOLUTE: 0.1 K/UL (ref 0–0.7)
EOSINOPHILS RELATIVE PERCENT: 0.5 %
GFR SERPL CREATININE-BSD FRML MDRD: >60 ML/MIN/{1.73_M2}
GFR SERPL CREATININE-BSD FRML MDRD: >60 ML/MIN/{1.73_M2}
GLUCOSE BLD-MCNC: 256 MG/DL (ref 70–99)
GLUCOSE BLD-MCNC: 258 MG/DL (ref 70–99)
GLUCOSE BLD-MCNC: 270 MG/DL (ref 70–99)
GLUCOSE BLD-MCNC: 309 MG/DL (ref 70–99)
GLUCOSE BLD-MCNC: 334 MG/DL (ref 70–99)
GLUCOSE BLD-MCNC: 357 MG/DL (ref 70–99)
HBA1C MFR BLD: 14.7 % (ref 4.8–5.9)
HBA1C MFR BLD: 14.8 % (ref 4.8–5.9)
HCT VFR BLD CALC: 34.1 % (ref 37–47)
HEMOGLOBIN: 11 G/DL (ref 12–16)
LYMPHOCYTES ABSOLUTE: 2.6 K/UL (ref 1–4.8)
LYMPHOCYTES RELATIVE PERCENT: 21.9 %
MCH RBC QN AUTO: 22.5 PG (ref 27–31.3)
MCHC RBC AUTO-ENTMCNC: 32.1 % (ref 33–37)
MCV RBC AUTO: 70.2 FL (ref 79.4–94.8)
MONOCYTES ABSOLUTE: 1.1 K/UL (ref 0.2–0.8)
MONOCYTES RELATIVE PERCENT: 9.3 %
NEUTROPHILS ABSOLUTE: 7.9 K/UL (ref 1.4–6.5)
NEUTROPHILS RELATIVE PERCENT: 67.5 %
PDW BLD-RTO: 13.7 % (ref 11.5–14.5)
PERFORMED ON: ABNORMAL
PERFORMED ON: NORMAL
PLATELET # BLD: 278 K/UL (ref 130–400)
POC CREATININE: 0.6 MG/DL (ref 0.6–1.2)
POC SAMPLE TYPE: NORMAL
POTASSIUM REFLEX MAGNESIUM: 4.3 MEQ/L (ref 3.4–4.9)
RBC # BLD: 4.86 M/UL (ref 4.2–5.4)
SODIUM BLD-SCNC: 131 MEQ/L (ref 135–144)
WBC # BLD: 11.7 K/UL (ref 4.8–10.8)

## 2023-03-14 PROCEDURE — 2580000003 HC RX 258: Performed by: INTERNAL MEDICINE

## 2023-03-14 PROCEDURE — 6370000000 HC RX 637 (ALT 250 FOR IP): Performed by: INTERNAL MEDICINE

## 2023-03-14 PROCEDURE — 0D9P0ZZ DRAINAGE OF RECTUM, OPEN APPROACH: ICD-10-PCS | Performed by: COLON & RECTAL SURGERY

## 2023-03-14 PROCEDURE — A4217 STERILE WATER/SALINE, 500 ML: HCPCS | Performed by: COLON & RECTAL SURGERY

## 2023-03-14 PROCEDURE — 3600000013 HC SURGERY LEVEL 3 ADDTL 15MIN: Performed by: COLON & RECTAL SURGERY

## 2023-03-14 PROCEDURE — 83036 HEMOGLOBIN GLYCOSYLATED A1C: CPT

## 2023-03-14 PROCEDURE — 99222 1ST HOSP IP/OBS MODERATE 55: CPT | Performed by: INTERNAL MEDICINE

## 2023-03-14 PROCEDURE — 6360000002 HC RX W HCPCS: Performed by: ANESTHESIOLOGY

## 2023-03-14 PROCEDURE — 3700000001 HC ADD 15 MINUTES (ANESTHESIA): Performed by: COLON & RECTAL SURGERY

## 2023-03-14 PROCEDURE — 6360000002 HC RX W HCPCS

## 2023-03-14 PROCEDURE — 80048 BASIC METABOLIC PNL TOTAL CA: CPT

## 2023-03-14 PROCEDURE — 6370000000 HC RX 637 (ALT 250 FOR IP): Performed by: COLON & RECTAL SURGERY

## 2023-03-14 PROCEDURE — 2580000003 HC RX 258

## 2023-03-14 PROCEDURE — 87070 CULTURE OTHR SPECIMN AEROBIC: CPT

## 2023-03-14 PROCEDURE — 6360000002 HC RX W HCPCS: Performed by: COLON & RECTAL SURGERY

## 2023-03-14 PROCEDURE — 7100000000 HC PACU RECOVERY - FIRST 15 MIN: Performed by: COLON & RECTAL SURGERY

## 2023-03-14 PROCEDURE — 85025 COMPLETE CBC W/AUTO DIFF WBC: CPT

## 2023-03-14 PROCEDURE — 6360000002 HC RX W HCPCS: Performed by: INTERNAL MEDICINE

## 2023-03-14 PROCEDURE — 1210000000 HC MED SURG R&B

## 2023-03-14 PROCEDURE — 87040 BLOOD CULTURE FOR BACTERIA: CPT

## 2023-03-14 PROCEDURE — 99253 IP/OBS CNSLTJ NEW/EST LOW 45: CPT | Performed by: COLON & RECTAL SURGERY

## 2023-03-14 PROCEDURE — 2709999900 HC NON-CHARGEABLE SUPPLY: Performed by: COLON & RECTAL SURGERY

## 2023-03-14 PROCEDURE — 2580000003 HC RX 258: Performed by: COLON & RECTAL SURGERY

## 2023-03-14 PROCEDURE — 6370000000 HC RX 637 (ALT 250 FOR IP)

## 2023-03-14 PROCEDURE — 87076 CULTURE ANAEROBE IDENT EACH: CPT

## 2023-03-14 PROCEDURE — 46040 I&D ISCHIORCT&/PERIRCT ABSC: CPT | Performed by: COLON & RECTAL SURGERY

## 2023-03-14 PROCEDURE — 3600000003 HC SURGERY LEVEL 3 BASE: Performed by: COLON & RECTAL SURGERY

## 2023-03-14 PROCEDURE — 36415 COLL VENOUS BLD VENIPUNCTURE: CPT

## 2023-03-14 PROCEDURE — 87075 CULTR BACTERIA EXCEPT BLOOD: CPT

## 2023-03-14 PROCEDURE — 6360000002 HC RX W HCPCS: Performed by: NURSE ANESTHETIST, CERTIFIED REGISTERED

## 2023-03-14 PROCEDURE — 3700000000 HC ANESTHESIA ATTENDED CARE: Performed by: COLON & RECTAL SURGERY

## 2023-03-14 PROCEDURE — 7100000001 HC PACU RECOVERY - ADDTL 15 MIN: Performed by: COLON & RECTAL SURGERY

## 2023-03-14 RX ORDER — ONDANSETRON 2 MG/ML
INJECTION INTRAMUSCULAR; INTRAVENOUS PRN
Status: DISCONTINUED | OUTPATIENT
Start: 2023-03-14 | End: 2023-03-14 | Stop reason: SDUPTHER

## 2023-03-14 RX ORDER — MIDAZOLAM HYDROCHLORIDE 1 MG/ML
INJECTION INTRAMUSCULAR; INTRAVENOUS PRN
Status: DISCONTINUED | OUTPATIENT
Start: 2023-03-14 | End: 2023-03-14 | Stop reason: SDUPTHER

## 2023-03-14 RX ORDER — KETOROLAC TROMETHAMINE 30 MG/ML
INJECTION, SOLUTION INTRAMUSCULAR; INTRAVENOUS PRN
Status: DISCONTINUED | OUTPATIENT
Start: 2023-03-14 | End: 2023-03-14 | Stop reason: SDUPTHER

## 2023-03-14 RX ORDER — DIPHENHYDRAMINE HYDROCHLORIDE 50 MG/ML
12.5 INJECTION INTRAMUSCULAR; INTRAVENOUS
Status: DISCONTINUED | OUTPATIENT
Start: 2023-03-14 | End: 2023-03-14 | Stop reason: HOSPADM

## 2023-03-14 RX ORDER — INSULIN LISPRO 100 [IU]/ML
0-4 INJECTION, SOLUTION INTRAVENOUS; SUBCUTANEOUS NIGHTLY
Status: DISCONTINUED | OUTPATIENT
Start: 2023-03-14 | End: 2023-03-15

## 2023-03-14 RX ORDER — OXYCODONE HYDROCHLORIDE AND ACETAMINOPHEN 5; 325 MG/1; MG/1
1 TABLET ORAL EVERY 4 HOURS PRN
Status: DISCONTINUED | OUTPATIENT
Start: 2023-03-14 | End: 2023-03-17 | Stop reason: HOSPADM

## 2023-03-14 RX ORDER — ONDANSETRON 2 MG/ML
4 INJECTION INTRAMUSCULAR; INTRAVENOUS
Status: DISCONTINUED | OUTPATIENT
Start: 2023-03-14 | End: 2023-03-14 | Stop reason: HOSPADM

## 2023-03-14 RX ORDER — OXYCODONE HYDROCHLORIDE 5 MG/1
5 TABLET ORAL
Status: DISCONTINUED | OUTPATIENT
Start: 2023-03-14 | End: 2023-03-14 | Stop reason: HOSPADM

## 2023-03-14 RX ORDER — SODIUM CHLORIDE 9 MG/ML
25 INJECTION, SOLUTION INTRAVENOUS PRN
Status: DISCONTINUED | OUTPATIENT
Start: 2023-03-14 | End: 2023-03-14 | Stop reason: HOSPADM

## 2023-03-14 RX ORDER — SODIUM CHLORIDE 0.9 % (FLUSH) 0.9 %
5-40 SYRINGE (ML) INJECTION EVERY 12 HOURS SCHEDULED
Status: DISCONTINUED | OUTPATIENT
Start: 2023-03-14 | End: 2023-03-14 | Stop reason: HOSPADM

## 2023-03-14 RX ORDER — PROPOFOL 10 MG/ML
INJECTION, EMULSION INTRAVENOUS PRN
Status: DISCONTINUED | OUTPATIENT
Start: 2023-03-14 | End: 2023-03-14 | Stop reason: SDUPTHER

## 2023-03-14 RX ORDER — LIDOCAINE HYDROCHLORIDE 20 MG/ML
INJECTION, SOLUTION INTRAVENOUS PRN
Status: DISCONTINUED | OUTPATIENT
Start: 2023-03-14 | End: 2023-03-14 | Stop reason: SDUPTHER

## 2023-03-14 RX ORDER — MEPERIDINE HYDROCHLORIDE 25 MG/ML
12.5 INJECTION INTRAMUSCULAR; INTRAVENOUS; SUBCUTANEOUS
Status: DISCONTINUED | OUTPATIENT
Start: 2023-03-14 | End: 2023-03-14 | Stop reason: HOSPADM

## 2023-03-14 RX ORDER — INSULIN LISPRO 100 [IU]/ML
0-8 INJECTION, SOLUTION INTRAVENOUS; SUBCUTANEOUS
Status: DISCONTINUED | OUTPATIENT
Start: 2023-03-14 | End: 2023-03-15

## 2023-03-14 RX ORDER — MAGNESIUM HYDROXIDE 1200 MG/15ML
LIQUID ORAL CONTINUOUS PRN
Status: COMPLETED | OUTPATIENT
Start: 2023-03-14 | End: 2023-03-14

## 2023-03-14 RX ORDER — VALSARTAN 160 MG/1
320 TABLET ORAL DAILY
Status: DISCONTINUED | OUTPATIENT
Start: 2023-03-14 | End: 2023-03-17 | Stop reason: HOSPADM

## 2023-03-14 RX ORDER — FENTANYL CITRATE 0.05 MG/ML
50 INJECTION, SOLUTION INTRAMUSCULAR; INTRAVENOUS EVERY 10 MIN PRN
Status: DISCONTINUED | OUTPATIENT
Start: 2023-03-14 | End: 2023-03-14 | Stop reason: HOSPADM

## 2023-03-14 RX ORDER — DEXTROSE MONOHYDRATE 100 MG/ML
INJECTION, SOLUTION INTRAVENOUS CONTINUOUS PRN
Status: DISCONTINUED | OUTPATIENT
Start: 2023-03-14 | End: 2023-03-17 | Stop reason: HOSPADM

## 2023-03-14 RX ORDER — DEXAMETHASONE SODIUM PHOSPHATE 4 MG/ML
INJECTION, SOLUTION INTRA-ARTICULAR; INTRALESIONAL; INTRAMUSCULAR; INTRAVENOUS; SOFT TISSUE PRN
Status: DISCONTINUED | OUTPATIENT
Start: 2023-03-14 | End: 2023-03-14 | Stop reason: SDUPTHER

## 2023-03-14 RX ORDER — ALOGLIPTIN 12.5 MG/1
25 TABLET, FILM COATED ORAL DAILY
Status: DISCONTINUED | OUTPATIENT
Start: 2023-03-14 | End: 2023-03-15

## 2023-03-14 RX ORDER — SODIUM CHLORIDE 0.9 % (FLUSH) 0.9 %
5-40 SYRINGE (ML) INJECTION PRN
Status: DISCONTINUED | OUTPATIENT
Start: 2023-03-14 | End: 2023-03-14 | Stop reason: HOSPADM

## 2023-03-14 RX ORDER — OXYCODONE HYDROCHLORIDE AND ACETAMINOPHEN 5; 325 MG/1; MG/1
2 TABLET ORAL EVERY 4 HOURS PRN
Status: DISCONTINUED | OUTPATIENT
Start: 2023-03-14 | End: 2023-03-17 | Stop reason: HOSPADM

## 2023-03-14 RX ORDER — METOPROLOL TARTRATE 50 MG/1
50 TABLET, FILM COATED ORAL 2 TIMES DAILY
Status: DISCONTINUED | OUTPATIENT
Start: 2023-03-14 | End: 2023-03-17 | Stop reason: HOSPADM

## 2023-03-14 RX ORDER — METOCLOPRAMIDE HYDROCHLORIDE 5 MG/ML
10 INJECTION INTRAMUSCULAR; INTRAVENOUS
Status: DISCONTINUED | OUTPATIENT
Start: 2023-03-14 | End: 2023-03-14 | Stop reason: HOSPADM

## 2023-03-14 RX ORDER — FENTANYL CITRATE 50 UG/ML
INJECTION, SOLUTION INTRAMUSCULAR; INTRAVENOUS PRN
Status: DISCONTINUED | OUTPATIENT
Start: 2023-03-14 | End: 2023-03-14 | Stop reason: SDUPTHER

## 2023-03-14 RX ORDER — LINEZOLID 2 MG/ML
600 INJECTION, SOLUTION INTRAVENOUS EVERY 12 HOURS
Status: DISCONTINUED | OUTPATIENT
Start: 2023-03-14 | End: 2023-03-17 | Stop reason: HOSPADM

## 2023-03-14 RX ORDER — INSULIN GLARGINE 100 [IU]/ML
0.25 INJECTION, SOLUTION SUBCUTANEOUS NIGHTLY
Status: DISCONTINUED | OUTPATIENT
Start: 2023-03-14 | End: 2023-03-14

## 2023-03-14 RX ORDER — GLIPIZIDE 5 MG/1
5 TABLET ORAL
Status: DISCONTINUED | OUTPATIENT
Start: 2023-03-14 | End: 2023-03-15

## 2023-03-14 RX ADMIN — PIPERACILLIN AND TAZOBACTAM 3375 MG: 3; .375 INJECTION, POWDER, LYOPHILIZED, FOR SOLUTION INTRAVENOUS at 10:55

## 2023-03-14 RX ADMIN — FENTANYL CITRATE 50 MCG: 50 INJECTION, SOLUTION INTRAMUSCULAR; INTRAVENOUS at 11:00

## 2023-03-14 RX ADMIN — DEXAMETHASONE SODIUM PHOSPHATE 4 MG: 4 INJECTION, SOLUTION INTRAMUSCULAR; INTRAVENOUS at 10:52

## 2023-03-14 RX ADMIN — INSULIN LISPRO 4 UNITS: 100 INJECTION, SOLUTION INTRAVENOUS; SUBCUTANEOUS at 21:27

## 2023-03-14 RX ADMIN — METOPROLOL TARTRATE 50 MG: 50 TABLET, FILM COATED ORAL at 09:19

## 2023-03-14 RX ADMIN — FENTANYL CITRATE 50 MCG: 50 INJECTION, SOLUTION INTRAMUSCULAR; INTRAVENOUS at 11:13

## 2023-03-14 RX ADMIN — GLIPIZIDE 5 MG: 5 TABLET ORAL at 16:36

## 2023-03-14 RX ADMIN — FENTANYL CITRATE 50 MCG: 50 INJECTION, SOLUTION INTRAMUSCULAR; INTRAVENOUS at 11:09

## 2023-03-14 RX ADMIN — VALSARTAN 320 MG: 160 TABLET, FILM COATED ORAL at 09:19

## 2023-03-14 RX ADMIN — KETOROLAC TROMETHAMINE 30 MG: 30 INJECTION, SOLUTION INTRAMUSCULAR at 00:25

## 2023-03-14 RX ADMIN — PIPERACILLIN AND TAZOBACTAM 3375 MG: 3; .375 INJECTION, POWDER, LYOPHILIZED, FOR SOLUTION INTRAVENOUS at 03:10

## 2023-03-14 RX ADMIN — PIPERACILLIN AND TAZOBACTAM 3375 MG: 3; .375 INJECTION, POWDER, LYOPHILIZED, FOR SOLUTION INTRAVENOUS at 21:26

## 2023-03-14 RX ADMIN — LIDOCAINE HYDROCHLORIDE 50 MG: 20 INJECTION, SOLUTION INTRAVENOUS at 10:52

## 2023-03-14 RX ADMIN — SODIUM CHLORIDE 1000 ML: 9 INJECTION, SOLUTION INTRAVENOUS at 12:06

## 2023-03-14 RX ADMIN — MIDAZOLAM HYDROCHLORIDE 2 MG: 1 INJECTION, SOLUTION INTRAMUSCULAR; INTRAVENOUS at 10:48

## 2023-03-14 RX ADMIN — HYDRALAZINE HYDROCHLORIDE 10 MG: 20 INJECTION INTRAMUSCULAR; INTRAVENOUS at 00:27

## 2023-03-14 RX ADMIN — PROPOFOL 150 MG: 10 INJECTION, EMULSION INTRAVENOUS at 10:52

## 2023-03-14 RX ADMIN — INSULIN LISPRO 4 UNITS: 100 INJECTION, SOLUTION INTRAVENOUS; SUBCUTANEOUS at 09:22

## 2023-03-14 RX ADMIN — FENTANYL CITRATE 50 MCG: 0.05 INJECTION, SOLUTION INTRAMUSCULAR; INTRAVENOUS at 12:05

## 2023-03-14 RX ADMIN — ONDANSETRON 4 MG: 2 INJECTION INTRAMUSCULAR; INTRAVENOUS at 02:05

## 2023-03-14 RX ADMIN — LINEZOLID 600 MG: 600 INJECTION, SOLUTION INTRAVENOUS at 19:43

## 2023-03-14 RX ADMIN — ALOGLIPTIN 25 MG: 12.5 TABLET, FILM COATED ORAL at 16:36

## 2023-03-14 RX ADMIN — METOPROLOL TARTRATE 50 MG: 50 TABLET, FILM COATED ORAL at 20:04

## 2023-03-14 RX ADMIN — SODIUM CHLORIDE: 9 INJECTION, SOLUTION INTRAVENOUS at 00:24

## 2023-03-14 RX ADMIN — FENTANYL CITRATE 50 MCG: 50 INJECTION, SOLUTION INTRAMUSCULAR; INTRAVENOUS at 10:52

## 2023-03-14 RX ADMIN — FENTANYL CITRATE 50 MCG: 50 INJECTION, SOLUTION INTRAMUSCULAR; INTRAVENOUS at 11:04

## 2023-03-14 RX ADMIN — FENTANYL CITRATE 50 MCG: 50 INJECTION, SOLUTION INTRAMUSCULAR; INTRAVENOUS at 11:25

## 2023-03-14 RX ADMIN — KETOROLAC TROMETHAMINE 30 MG: 30 INJECTION, SOLUTION INTRAMUSCULAR; INTRAVENOUS at 11:27

## 2023-03-14 RX ADMIN — ONDANSETRON 4 MG: 2 INJECTION INTRAMUSCULAR; INTRAVENOUS at 11:27

## 2023-03-14 RX ADMIN — SODIUM CHLORIDE, POTASSIUM CHLORIDE, SODIUM LACTATE AND CALCIUM CHLORIDE 1700 ML: 600; 310; 30; 20 INJECTION, SOLUTION INTRAVENOUS at 00:54

## 2023-03-14 ASSESSMENT — PAIN DESCRIPTION - DESCRIPTORS
DESCRIPTORS: THROBBING
DESCRIPTORS: SORE

## 2023-03-14 ASSESSMENT — PAIN DESCRIPTION - LOCATION
LOCATION: RECTUM;PERINEUM
LOCATION: BUTTOCKS

## 2023-03-14 ASSESSMENT — PAIN SCALES - GENERAL
PAINLEVEL_OUTOF10: 8
PAINLEVEL_OUTOF10: 5
PAINLEVEL_OUTOF10: 2
PAINLEVEL_OUTOF10: 6

## 2023-03-14 ASSESSMENT — ENCOUNTER SYMPTOMS
VOMITING: 0
DIARRHEA: 0
NAUSEA: 0
SHORTNESS OF BREATH: 0
COUGH: 0

## 2023-03-14 ASSESSMENT — PAIN DESCRIPTION - ORIENTATION: ORIENTATION: RIGHT

## 2023-03-14 NOTE — H&P
[place] [address] MEDICINE    HISTORY AND PHYSICAL EXAM    PATIENT NAME:  Sid Peterson    MRN:  [id number]  SERVICE DATE:  3/13/2023   SERVICE TIME:  8:27 PM    Primary Care Physician: Brea Hickey MD     SUBJECTIVE  CHIEF COMPLAINT:  Abscess (buttocks)       HPI:     Sid Peterson is a 50 y.o. female presented to the emergency department with complaints of an abscess on her buttock. She states that she began to feel pain on Wednesday or Thursday of last week. On Friday it was so bad she could not concentrate. Saturday morning she went to urgent care and was given antibiotics (cephalexin) and told to soak in Epsom salt. She did soak in Epsom salt and it made the area dry. She says she put some Vaseline on it. Today she came to the ED because she tolerated the pain is much as she could. She says it is putting pressure against her vagina and she can no longer stand. The abscess is located on her left buttock below her vagina. The area is firm. There is a small round area that has crusted over. No drainage noted. No open areas. Patient rates the pain 7 out of 10. She was able to take 3 doses of her antibiotics. She states that she is not able to have bowel movements and did have some fevers. Sid Peterson is a 50 y.o. female  has a past medical history of Bilateral chronic otitis media (August 2015), DMII (diabetes mellitus, type 2) ([address] (2008), Fibroid, uterus, History of medication noncompliance, HTN (hypertension) (2009), and Obesity (BMI 30-39.9). is being admitted for Abscess. PAST MEDICAL HISTORY:    Past Medical History:   Diagnosis Date    Bilateral chronic otitis media August 2015    DMII (diabetes mellitus, type 2) ([address] 2008    Fibroid, uterus     History of medication noncompliance     HTN (hypertension) 2009    Obesity (BMI 30-39. 9)      PAST SURGICAL HISTORY:    Past Surgical History:   Procedure Laterality Date    HYSTERECTOMY (CERVIX STATUS UNKNOWN) 2009    fibroids, Dr Maurer, ovaries intact    MYRINGOTOMY AND TYMPANOSTOMY TUBE PLACEMENT Bilateral 2015    Dr. 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: unemployed     Occupation: [employer]      Comment: [employer] full time agency   Tobacco Use    Smoking status: Former     Packs/day: 1.00     Years: 15.00     Pack years: 15.00     Types: Cigarettes     Quit date: 2005     Years since quittin.0    Smokeless tobacco: Current     Types: Chew   Vaping Use    Vaping Use: Never used   Substance and Sexual Activity    Alcohol use: Yes     Alcohol/week: 0.0 standard drinks     Comment: occasional wine consumption not to excess    Drug use: No    Sexual activity: Not on file   Other Topics Concern    Not on file   Social History Narrative    Not on file     Social Determinants of Health     Financial Resource Strain: Not on file   Food Insecurity: Not on file   Transportation Needs: Not on file   Physical Activity: Not on file   Stress: Not on file   Social Connections: Not on file   Intimate Partner Violence: Not on file   Housing Stability: Not on file     MEDICATIONS:   Prior to Admission medications    Medication Sig Start Date End Date Taking? Authorizing Provider   ondansetron (ZOFRAN ODT) 4 MG disintegrating tablet Take 1 tablet by mouth every 8 hours as needed for Nausea 21   Tanmay Otero MD   cetirizine (ZYRTEC) 10 MG tablet Take 1 tablet by mouth daily 20   Kunal Geller MD   albuterol sulfate HFA (VENTOLIN HFA) 108 (90 Base) MCG/ACT inhaler Inhale 2 puffs into the lungs 4 times daily as needed for Wheezing 20   Kunal Geller MD   ibuprofen  (ADVIL;MOTRIN) 800 MG tablet Take 1 tablet by mouth every 8 hours as needed for Pain 4/9/20   Delman Lombard, APRN - CNP   FREESTYLE LITE strip TEST TWICE DAILY 6/12/17   Arvis Duet, DO   metoprolol succinate (TOPROL XL) 100 MG extended release tablet TAKE 1 TABLET BY MOUTH DAILY 5/26/17   Arvis Duet, DO   hydrALAZINE (APRESOLINE) 25 MG tablet TAKE 2 TABLETS BY MOUTH TWICE DAILY 5/26/17   Arvis Duet, DO   losartan-hydrochlorothiazide (HYZAAR) 100-25 MG per tablet TAKE 1 TABLET BY MOUTH DAILY 5/8/17   Arvis Duet, DO   Loratadine-Pseudoephedrine (LORATADINE-D 24HR PO) Take by mouth    Historical Provider, MD   sitaGLIPtin (JANUVIA) 100 MG tablet TAKE 1 TABLET BY MOUTH DAILY 11/15/16   Arvis Duet, DO   metFORMIN (GLUCOPHAGE XR) 500 MG extended release tablet 2 po bid 11/15/16   Arvis Duet, DO   Blood Glucose Monitoring Suppl (FREESTYLE LITE) YULIYA 1 Device by Does not apply route daily as needed 4/8/16   Nroth Lui MD   FREESTYLE LANCETS MISC 1 each by Does not apply route daily 4/8/16   North Lui MD   Multiple Vitamins-Minerals (MULTIVITAMIN ADULT PO) Take 1 tablet by mouth daily    Historical Provider, MD       ALLERGIES: Influenza vaccines    REVIEW OF SYSTEM:   Review of Systems   Constitutional:  Positive for fever. Negative for chills and fatigue. HENT:  Negative for congestion, rhinorrhea, sore throat and trouble swallowing. Eyes:  Negative for photophobia and visual disturbance. Respiratory:  Negative for cough, chest tightness and shortness of breath. Cardiovascular:  Negative for chest pain, palpitations and leg swelling. Gastrointestinal:  Negative for abdominal distention, abdominal pain, constipation, diarrhea, nausea and vomiting. Genitourinary:  Negative for difficulty urinating, flank pain and hematuria. Musculoskeletal:  Negative for back pain and gait problem. Skin:  Negative for color change and wound.         Abscess to left buttock Neurological:  Negative for dizziness, syncope, speech difficulty, weakness, light-headedness, numbness and headaches. Psychiatric/Behavioral:  Negative for behavioral problems and confusion. OBJECTIVE  PHYSICAL EXAM: BP (!) 164/85   Pulse 100   Temp 100.1 °F (37.8 °C) (Oral)   Resp 18   Ht 5' 4\" (1.626 m)   Wt 198 lb (89.8 kg)   SpO2 98%   BMI 33.99 kg/m²   Physical Exam  Vitals and nursing note reviewed. Constitutional:       General: She is awake. She is not in acute distress. Appearance: Normal appearance. She is well-developed and normal weight. She is not ill-appearing, toxic-appearing or diaphoretic. HENT:      Head: Normocephalic and atraumatic. Nose: Nose normal. No congestion or rhinorrhea. Mouth/Throat:      Lips: Pink. Mouth: Mucous membranes are moist.      Tongue: Tongue does not deviate from midline. Pharynx: Oropharynx is clear. No oropharyngeal exudate or posterior oropharyngeal erythema. Eyes:      General: Lids are normal. No visual field deficit or scleral icterus. Extraocular Movements: Extraocular movements intact. Right eye: No nystagmus. Left eye: No nystagmus. Conjunctiva/sclera: Conjunctivae normal.   Neck:      Thyroid: No thyromegaly. Vascular: No JVD. Trachea: Trachea and phonation normal.   Cardiovascular:      Rate and Rhythm: Regular rhythm. Tachycardia present. Pulses: Normal pulses. Radial pulses are 2+ on the right side and 2+ on the left side. Dorsalis pedis pulses are 2+ on the right side and 2+ on the left side. Posterior tibial pulses are 2+ on the right side and 2+ on the left side. Heart sounds: Normal heart sounds, S1 normal and S2 normal. No murmur heard. Pulmonary:      Effort: Pulmonary effort is normal. No respiratory distress. Breath sounds: Normal breath sounds and air entry. No stridor or decreased air movement.  No decreased breath sounds, wheezing, rhonchi or rales. Chest:      Chest wall: No tenderness. Abdominal:      General: Abdomen is flat. Bowel sounds are normal. There is no distension. Palpations: Abdomen is soft. Tenderness: There is no abdominal tenderness. There is no guarding. Genitourinary:     General: Normal vulva. Exam position: Knee-chest position. Musculoskeletal:         General: No swelling, tenderness, deformity or signs of injury. Normal range of motion. Cervical back: Normal range of motion and neck supple. No rigidity or tenderness. Right lower leg: No edema. Left lower leg: No edema. Feet:      Right foot:      Skin integrity: Skin integrity normal.      Left foot:      Skin integrity: Skin integrity normal.   Skin:     General: Skin is warm and dry. Capillary Refill: Capillary refill takes less than 2 seconds. Coloration: Skin is not jaundiced or pale. Findings: No bruising, erythema, lesion or rash. Nails: There is no clubbing. Neurological:      General: No focal deficit present. Mental Status: She is alert and oriented to person, place, and time. Mental status is at baseline. Sensory: Sensation is intact. Motor: Motor function is intact. No weakness. Psychiatric:         Attention and Perception: Attention and perception normal.         Mood and Affect: Mood and affect normal.         Speech: Speech normal.         Behavior: Behavior normal. Behavior is cooperative. Thought Content: Thought content normal.         Cognition and Memory: Cognition and memory normal.         Judgment: Judgment normal.     Neurologic Exam     Mental Status   Oriented to person, place, and time. Speech: speech is normal      DATA:     Diagnostic tests reviewed for today's visit:    Most recent labs and imaging results reviewed.      LABS:    Recent Results (from the past 24 hour(s))   CBC with Auto Differential    Collection Time: 03/13/23  5:00 PM   Result Value Ref Range    WBC 11.2 (H) 4.8 - 10.8 K/uL    RBC 5.37 4.20 - 5.40 M/uL    Hemoglobin 12.2 12.0 - 16.0 g/dL    Hematocrit 38.1 37.0 - 47.0 %    MCV 71.0 (L) 79.4 - 94.8 fL    MCH 22.8 (L) 27.0 - 31.3 pg    MCHC 32.1 (L) 33.0 - 37.0 %    RDW 13.6 11.5 - 14.5 %    Platelets 604 051 - 273 K/uL    PLATELET SLIDE REVIEW Normal     Neutrophils % 66.0 %    Lymphocytes % 20.0 %    Monocytes % 2.9 %    Eosinophils % 6.0 %    Basophils % 1.0 %    Neutrophils Absolute 7.4 (H) 1.4 - 6.5 K/uL    Lymphocytes Absolute 2.7 1.0 - 4.8 K/uL    Monocytes Absolute 0.3 0.2 - 0.8 K/uL    Eosinophils Absolute 0.7 0.0 - 0.7 K/uL    Basophils Absolute 0.1 0.0 - 0.2 K/uL    Atypical Lymphocytes Relative 4 %    Vacuolated Neutrophils 1+     Anisocytosis 1+     Microcytes 1+    CMP    Collection Time: 03/13/23  5:00 PM   Result Value Ref Range    Sodium 133 (L) 135 - 144 mEq/L    Potassium 4.3 3.4 - 4.9 mEq/L    Chloride 95 95 - 107 mEq/L    CO2 20 20 - 31 mEq/L    Anion Gap 18 (H) 9 - 15 mEq/L    Glucose 288 (H) 70 - 99 mg/dL    BUN 10 6 - 20 mg/dL    Creatinine 0.70 0.50 - 0.90 mg/dL    Est, Glom Filt Rate >60.0 >60    Calcium 9.9 8.5 - 9.9 mg/dL    Total Protein 8.3 (H) 6.3 - 8.0 g/dL    Albumin 3.8 3.5 - 4.6 g/dL    Total Bilirubin 0.4 0.2 - 0.7 mg/dL    Alkaline Phosphatase 184 (H) 40 - 130 U/L    ALT 15 0 - 33 U/L    AST 10 0 - 35 U/L    Globulin 4.5 (H) 2.3 - 3.5 g/dL   Lactic Acid    Collection Time: 03/13/23  5:00 PM   Result Value Ref Range    Lactic Acid 1.7 0.5 - 2.2 mmol/L   Procalcitonin    Collection Time: 03/13/23  5:00 PM   Result Value Ref Range    Procalcitonin <0.02 0.00 - 0.15 ng/mL   Urinalysis    Collection Time: 03/13/23  5:00 PM   Result Value Ref Range    Color, UA Yellow Straw/Yellow    Clarity, UA Clear Clear    Glucose, Ur >=1000 (A) Negative mg/dL    Bilirubin Urine Negative Negative    Ketones, Urine >=80 (A) Negative mg/dL    Specific Gravity, UA 1.026 1.005 - 1.030    Blood, Urine Negative Negative    pH, UA 5.5 5.0 - 9.0    Protein,  (A) Negative mg/dL    Urobilinogen, Urine 0.2 <2.0 E.U./dL    Nitrite, Urine Negative Negative    Leukocyte Esterase, Urine Negative Negative   Microscopic Urinalysis    Collection Time: 03/13/23  5:00 PM   Result Value Ref Range    Bacteria, UA RARE (A) Negative /HPF    Hyaline Casts, UA 1-3 0 - 5 /HPF    WBC, UA 10-20 (A) 0 - 5 /HPF    RBC, UA 0-2 0 - 5 /HPF    Epithelial Cells, UA 6-10 0 - 5 /HPF   POCT CREATININE    Collection Time: 03/13/23  5:43 PM   Result Value Ref Range    POC CREATININE WHOLE BLOOD 0.6        IMAGING:   CT ABDOMEN PELVIS W IV CONTRAST Additional Contrast? None    Result Date: 3/13/2023  5.0 x 2.6 cm left perirectal/perianal abscess formation. No evidence of acute intra-abdominal or pelvic process. VTE Prophylaxis: low molecular weight heparin -  start     ASSESSMENT AND PLAN    Sepsis secondary to a Perirectal abscess   WBCs 11.2, temperature 99.1 then 100.1, respiratory rate greater than 18, heart rate 136. Ct shows 5.0 x 2.6 cm left perirectal/perianal abscess formation  Admit  Finish bolus on unit (1.7 L)  Blood cultures  Lactic acid Q2 hours x2  [facility]  General surgery  Daily labs  Diabetes Mellitis II  Takes glipizide, Amaryl, last A1C 11.6 on 9/1/22,   Hold any p.o. antidiabetic medication  Medium dose sliding scale coverage  Accu-Cheks AC at bedtime  check A1c  diabetic diet  hypoglycemia rescue meds as needed. Hypertension  /105, takes metoprolol and valsartan at home, denies blurred vision and chest pain  Will resume home meds, as tolerated, when home med list is completed by nursing.     Plan of care discussed with: patient    SIGNATURE: Jose Jacobo, VIOLETTE - CNP  DATE: March 13, 2023  TIME: 8:27 PM     Sydney Claire MD - supervising physician

## 2023-03-14 NOTE — OP NOTE
[facility] [address] [place] [address], [address] [place] [address]                                OPERATIVE REPORT    PATIENT NAME: Estevan Padgett                :        1970  MED REC NO:   [id number]                            ROOM:       W478  ACCOUNT NO:   [de-identified]                           ADMIT DATE: 2023  PROVIDER:     Fabricio Love MD    DATE OF PROCEDURE:  2023    PREOPERATIVE DIAGNOSIS:  Left perirectal abscess. POSTOPERATIVE DIAGNOSIS:  Left perirectal abscess. PROCEDURE PERFORMED:  Incision and drainage of left perirectal abscess. SURGEON:  Fabricio Love MD    ASSISTANT:  Ms. Clifford Corral. ANESTHESIA:  General anesthesia. ESTIMATED BLOOD LOSS:  50 mL. SPECIMENS:  Cultures. COMPLICATIONS:  None. INDICATIONS:  This is a 27-year-old female with a left perirectal  abscess on clinical exam as well as imaging. Risks and benefits of incision and drainage described. Risks of  infection, bleeding, recurrence, postoperative pain were all addressed. Despite the risks, she wished to proceed. Consent obtained. OPERATIVE PROCEDURE:  She was taken to the operating room and placed in  the supine position. General anesthesia was administered. She was  placed in [address] [place] [address]. All pressure points were properly padded. Perianal area was prepped and draped with a Betadine-containing  solution. She was on preoperative antibiotics. A time-out was taken  for appropriate verification and verification of laterality. An incision was made over the fluctuant area left perirectal space and a  large amount of pus returned. Cultures were taken. Loculations were  broken up digitally. Irrigation was performed. The incision measured  approximately 6 cm. Excellent hemostasis was then assured for the next  15 minutes. No other abnormalities were seen. The entire abscess was drained.   A  2-inch Noris soaked in Betadine was placed in the wound and dressings  were applied. Mesh pants were placed. The patient was heard returned back to supine  position, extubated and taken to Recovery for postop monitoring. Prior  to and after completion, the lap, needle, instrument counts were all  correct.         Roopa Chavis MD    D: 03/14/2023 12:15:01       T: 03/14/2023 12:17:26     [id number]  Job#: [id number]     Doc#: [id number]    CC:

## 2023-03-14 NOTE — ED NOTES
Patient report called to [facility], no questions or concerns at this time     Maki Araiza RN  03/13/23 2111

## 2023-03-14 NOTE — PROGRESS NOTES
Progress Note  Date:3/14/2023       [facility]  Patient Name:Jaqueline Schmidt     YOB: 1970     Age:52 y.o. Subjective    Subjective:  Symptoms:  She reports malaise and weakness. No shortness of breath, cough, chest pain, headache, chest pressure, anorexia, diarrhea or anxiety. Diet:  No nausea or vomiting. Review of Systems   Respiratory:  Negative for cough and shortness of breath. Cardiovascular:  Negative for chest pain. Gastrointestinal:  Negative for anorexia, diarrhea, nausea and vomiting. Neurological:  Positive for weakness. Objective         Vitals Last 24 Hours:  TEMPERATURE:  Temp  Av.7 °F (37.1 °C)  Min: 97.9 °F (36.6 °C)  Max: 100.1 °F (37.8 °C)  RESPIRATIONS RANGE: Resp  Av.3  Min: 16  Max: 20  PULSE OXIMETRY RANGE: SpO2  Av.6 %  Min: 98 %  Max: 100 %  PULSE RANGE: Pulse  Av.1  Min: 100  Max: 136  BLOOD PRESSURE RANGE: Systolic (40UVG), RLZ:100 , Min:137 , GXQ:962   ; Diastolic (87EXC), JGQ:43, Min:67, Max:105    I/O (24Hr): Intake/Output Summary (Last 24 hours) at 3/14/2023 0959  Last data filed at 3/14/2023 4967  Gross per 24 hour   Intake 240 ml   Output 2400 ml   Net -2160 ml     Objective:  General Appearance:  Comfortable, well-appearing and in no acute distress. Vital signs: (most recent): Blood pressure (!) 147/67, pulse (!) 108, temperature 97.9 °F (36.6 °C), temperature source Oral, resp. rate 16, height 5' 4\" (1.626 m), weight 196 lb 3.2 oz (89 kg), SpO2 100 %, not currently breastfeeding. HEENT: Normal HEENT exam.    Lungs:  Breath sounds clear to auscultation. Heart: S1 normal and S2 normal.    Abdomen: Abdomen is soft. Bowel sounds are normal.   There is no epigastric area or suprapubic area tenderness. Neurological: Patient is alert. Pupils:  Pupils are equal, round, and reactive to light. Skin:  Warm.     Labs/Imaging/Diagnostics    Labs:  CBC:  Recent Labs     23  1700 23  9673 WBC 11.2* 11.7*   RBC 5.37 4.86   HGB 12.2 11.0*   HCT 38.1 34.1*   MCV 71.0* 70.2*   RDW 13.6 13.7    278     CHEMISTRIES:  Recent Labs     03/13/23  1700 03/13/23  1734 03/14/23  0643   *  --  131*   K 4.3  --  4.3   CL 95  --  95   CO2 20  --  16*   BUN 10  --  7   CREATININE 0.70 0.6 0.68   GLUCOSE 288*  --  309*     PT/INR:No results for input(s): PROTIME, INR in the last 72 hours. APTT:No results for input(s): APTT in the last 72 hours. LIVER PROFILE:  Recent Labs     03/13/23  1700   AST 10   ALT 15   BILITOT 0.4   ALKPHOS 184*       Imaging Last 24 Hours:  CT ABDOMEN PELVIS W IV CONTRAST Additional Contrast? None    Result Date: 3/13/2023  EXAMINATION: CT OF THE ABDOMEN AND PELVIS WITH CONTRAST 3/13/2023 6:57 pm TECHNIQUE: CT of the abdomen and pelvis was performed with the administration of intravenous contrast. Multiplanar reformatted images are provided for review. Automated exposure control, iterative reconstruction, and/or weight based adjustment of the mA/kV was utilized to reduce the radiation dose to as low as reasonably achievable. COMPARISON: None. HISTORY: ORDERING SYSTEM PROVIDED HISTORY: perirectal abscess TECHNOLOGIST PROVIDED HISTORY: PLEASE EXTEND TO PERINEAL AREA Reason for exam:->perirectal abscess Additional Contrast?->None Decision Support Exception - unselect if not a suspected or confirmed emergency medical condition->Emergency Medical Condition (MA) What reading provider will be dictating this exam?->CRC FINDINGS: Lower Chest: No infiltrates or pleural effusion. Small hiatal hernia. Organs: Liver is normal in size with no focal lesions. Gallbladder is present with no calcified stones. Spleen is not enlarged. Pancreas and adrenal glands appear unremarkable. There is symmetric enhancement of the kidneys with no hydronephrosis. GI/Bowel: No evidence of bowel obstruction. Appendix is normal. Pelvis: Bladder is suboptimally distended. The uterus is surgically absent. There is no significant free fluid. Peritoneum/Retroperitoneum: No free air or significant adenopathy. Bones/Soft Tissues: There is 5.0 x 2.6 cm air containing collection in the left perianal/perirectal space, most compatible with abscess. 5.0 x 2.6 cm left perirectal/perianal abscess formation. No evidence of acute intra-abdominal or pelvic process. Assessment//Plan           Hospital Problems             Last Modified POA    * (Principal) Abscess 3/13/2023 Yes   Sepsis   DM with hyperglycemia  HTN  Perirectal abscess  Assessment & Plan  3/14: Evaluated the patient with chaperone, going for I&D, consult infectious disease, follow hemoglobin A1c. Counseling was given about diabetes. Patient noncompliant at home. No new complaints.   Electronically signed by Ab Queen MD on 3/14/23 at 9:59 AM EDT

## 2023-03-14 NOTE — ANESTHESIA POSTPROCEDURE EVALUATION
Department of Anesthesiology  Postprocedure Note    Patient: Pricila Vogt  MRN: [id number]  YOB: 1970  Date of evaluation: 3/14/2023      Procedure Summary     Date: 03/14/23 Room / Location: [address]    Anesthesia Start: 4030 Anesthesia Stop: 3234    Procedure: INCISION AND DRAINAGE PERIRECTAL ABSCESS Diagnosis:       Perirectal abscess      (PERIRECTAL ABSCESS)    Surgeons: Bren Muñoz MD Responsible Provider: Brooklyn Lee MD    Anesthesia Type: general ASA Status: 3 - Emergent          Anesthesia Type: No value filed.     Mercedes Phase I: Mercedes Score: 10    Mercedes Phase II:        Anesthesia Post Evaluation    Patient location during evaluation: PACU  Level of consciousness: awake  Pain score: 0  Airway patency: patent  Nausea & Vomiting: no vomiting and no nausea  Complications: no  Cardiovascular status: hemodynamically stable  Respiratory status: acceptable  Hydration status: stable

## 2023-03-14 NOTE — CONSULTS
Infectious Disease     Patient Name: Jaqueline Schmidt  Date: 3/14/2023  YOB: 1970  Medical Record Number: [id number]        Chief Complaint   Patient presents with    Abscess     buttocks          History of Present Illness:    Patient  is a 52 y.o. female presented to the emergency department with complaints area of pain and swelling  on her buttock.  started last week.  Progressively worse. she went to urgent care initially  and was given kelfex and told to soak in Epsom salt.  things did not improve, she came to t.  The abscess is located on her left buttock below her vagina.  The area is firm.  No fevers.CT   5.0 x 2.6 cm air containing collection in the   left perianal/perirectal space   Had drainage per surgery today.        Review of Systems: All other ROS reviewed and are negative other than as stated in HPI            Social History     Tobacco Use    Smoking status: Former     Packs/day: 1.00     Years: 15.00     Pack years: 15.00     Types: Cigarettes     Quit date: 2005     Years since quittin.0    Smokeless tobacco: Current     Types: Chew   Vaping Use    Vaping Use: Never used   Substance Use Topics    Alcohol use: Yes     Alcohol/week: 0.0 standard drinks     Comment: occasional wine consumption not to excess    Drug use: No         Past Medical History:   Diagnosis Date    Bilateral chronic otitis media 2015    DMII (diabetes mellitus, type 2) (HCC)     Fibroid, uterus     History of medication noncompliance     HTN (hypertension)     Obesity (BMI 30-39.9)            Past Surgical History:   Procedure Laterality Date    HYSTERECTOMY (CERVIX STATUS UNKNOWN)  2009    fibroids, Dr Maurer, ovaries intact    MYRINGOTOMY AND TYMPANOSTOMY TUBE PLACEMENT Bilateral 2015    Dr. Ck Caban         No current facility-administered medications on file prior to encounter.     Current Outpatient Medications on File Prior to Encounter   Medication Sig Dispense  Refill    valsartan (DIOVAN) 320 MG tablet Take 320 mg by mouth daily      metoprolol tartrate (LOPRESSOR) 50 MG tablet Take 50 mg by mouth in the morning and at bedtime      glimepiride (AMARYL) 2 MG tablet Take 2 mg by mouth 2 times daily (with meals)      pioglitazone (ACTOS) 45 MG tablet Take 45 mg by mouth daily Every morning      FREESTYLE LITE strip TEST TWICE DAILY 100 strip 0    sitaGLIPtin (JANUVIA) 100 MG tablet TAKE 1 TABLET BY MOUTH DAILY 30 tablet 2    Blood Glucose Monitoring Suppl (FREESTYLE LITE) YULIYA 1 Device by Does not apply route daily as needed 1 Device 0    FREESTYLE LANCETS MISC 1 each by Does not apply route daily 100 each 3       Allergies   Allergen Reactions    Influenza Vaccines          Family History   Problem Relation Age of Onset    Diabetes Mother          Physical Exam:     Blood pressure (!) 170/88, pulse (!) 101, temperature 97.8 °F (36.6 °C), temperature source Temporal, resp. rate 20, height 5' 4\" (1.626 m), weight 196 lb 3.2 oz (89 kg), SpO2 95 %, not currently breastfeeding. General: Patient appears ok at the present time. NAD  Skin: no new rashes  HEENT:  Neck is supple, No subconjunctival hemorrhages, no oral exudates  Heart: S1 S2  Lungs: clear bilaterally   Abdomen: soft, ND, NTTP,   Back :no CVA tenderness, area packed  Extrem: No edema, non tender  Neuro exam: CN II-XII intact  Psych: cooperative    Labs: I have reviewed all lab results by electronic record, including most recent CBC, metabolic panel, and pertinent abnormalities were addressed from an infectious disease perspective. Trends are being monitored over time.    Lab Results   Component Value Date    WBC 11.7 (H) 03/14/2023    HGB 11.0 (L) 03/14/2023    HCT 34.1 (L) 03/14/2023    MCV 70.2 (L) 03/14/2023     03/14/2023     Lab Results   Component Value Date/Time     03/14/2023 06:43 AM    K 4.3 03/14/2023 06:43 AM    CL 95 03/14/2023 06:43 AM    CO2 16 03/14/2023 06:43 AM    BUN 7 03/14/2023 06:43 AM    CREATININE 0.68 03/14/2023 06:43 AM    GLUCOSE 309 03/14/2023 06:43 AM    GLUCOSE 119 02/24/2012 10:13 AM    CALCIUM 9.2 03/14/2023 06:43 AM        Radiology:  I have reviewed imaging results per electronic record and most pertinent abnormalities are being addressed from an infectious disease standpoint.              ASSESSMENT:  Perirectal abscess  Leukocytosis  DM 2, poorly controlled      PLAN:  Zosyn  Zyvox  Await cultures

## 2023-03-14 NOTE — FLOWSHEET NOTE
Patient is in bed and her B.P. remain high,hydrazine 10 mg ivp  was given but after a few minutes he voiced that her heart rate was racing,heart rate per monitor is 116.  0025 toradol 30 mg was given for right buttock pain,described as throbbing sharp pain. 00:50 patient expressed that her pain is a lot better since she was able to sit on the commode. 0205 she was medicated with zofran for complain of nausea,4mg IV push.    2:20 per patient the nausea is gone and that her heart is not racing. 06:30 she is in bed,her eyes were closed,her breathing is effortless,sinus rhythm 100 heart rate. left antecubital iv site is intact.

## 2023-03-14 NOTE — PLAN OF CARE
Problem: Discharge Planning  Goal: Discharge to home or other facility with appropriate resources  Outcome: Progressing  Flowsheets (Taken 3/13/2023 2350 by Dionne Vail, SALVADOR)  Discharge to home or other facility with appropriate resources: Identify discharge learning needs (meds, wound care, etc)     Problem: Pain  Goal: Verbalizes/displays adequate comfort level or baseline comfort level  Outcome: Progressing  Flowsheets (Taken 3/13/2023 2331 by Dionne Vail RN)  Verbalizes/displays adequate comfort level or baseline comfort level:   Encourage patient to monitor pain and request assistance   Assess pain using appropriate pain scale     Problem: ABCDS Injury Assessment  Goal: Absence of physical injury  Outcome: Progressing     Problem: Chronic Conditions and Co-morbidities  Goal: Patient's chronic conditions and co-morbidity symptoms are monitored and maintained or improved  Outcome: Progressing

## 2023-03-14 NOTE — CARE COORDINATION
Case Management Assessment  Initial Evaluation    Date/Time of Evaluation: 3/14/2023 9:16 AM  Assessment Completed by: Kiara Hendrickson RN    If patient is discharged prior to next notation, then this note serves as note for discharge by case management. Patient Name: Anselmo Gray                   YOB: 1970  Diagnosis: Perirectal abscess [K61.1]  Abscess [L02.91]                   Date / Time: 3/13/2023  4:27 PM    Patient Admission Status: Inpatient   Readmission Risk (Low < 19, Mod (19-27), High > 27): Readmission Risk Score: 6.9    Current PCP: Christopher Winkler MD  PCP verified by CM? Yes    Chart Reviewed: Yes      History Provided by: Patient  Patient Orientation: Alert and Oriented, Person, Place, Situation, Self    Patient Cognition: Alert    Hospitalization in the last 30 days (Readmission):  No    If yes, Readmission Assessment in  Navigator will be completed. Advance Directives:      Code Status: Full Code   Patient's Primary Decision Maker is: Patient Declined (Legal Next of Kin Remains as Decision Maker)      Discharge Planning:    Patient lives with: Spouse/Significant Other Type of Home: House  Primary Care Giver: Self  Patient Support Systems include: Spouse/Significant Other   Current Financial resources:    Current community resources:    Current services prior to admission: None            Current DME:              Type of Home Care services:  None    ADLS  Prior functional level: Independent in ADLs/IADLs  Current functional level: Independent in ADLs/IADLs    PT AM-PAC:   /24  OT AM-PAC:   /24    Family can provide assistance at DC: Yes  Would you like Case Management to discuss the discharge plan with any other family members/significant others, and if so, who?  No  Plans to Return to Present Housing: Yes  Other Identified Issues/Barriers to RETURNING to current housing: YES  Potential Assistance needed at discharge: N/A            Potential DME:    Patient expects to discharge to: [facility] for transportation at discharge:      Financial    Payor: Chip Ruth / Plan: Prem Francis / Product Type: *No Product type* /     Does insurance require precert for SNF: Yes    Potential assistance Purchasing Medications: No  Meds-to-Beds request: Yes      [facility] [address], [place] - [address] [facility] [address]  [address] [facility] [address]  Phone: [phone] Fax: [phone] Felicia Gonsales [address] [phone] Kenzie Carry [phone]  [address] [place] [address]  Phone: [phone] Fax: [phone]      Notes:    Factors facilitating achievement of predicted outcomes: Motivated and Pleasant    Barriers to discharge: Pain    Additional Case Management Notes: MET WITH PT AT BEDSIDE TO DISCUSS DISCHARGE PLANNING. PT LIVES AT HOME WITH BF AND PLANS TO RETURN THERE, DENIES NEEDS. PT HAS NO DME OR RESPIRATORY EQUIPMENT. PT WORKS FULL TIME JOB. IF ATB NEEDED ON DC PT REQUESTING ORAL. MONITOR FOR IV ATB NEEDS. The Plan for Transition of Care is related to the following treatment goals of Perirectal abscess [K61.1]  Abscess [F31.85]    IF APPLICABLE: The Patient and/or patient representative Jaqueline and her family were provided with a choice of provider and agrees with the discharge plan. Freedom of choice list with basic dialogue that supports the patient's individualized plan of care/goals and shares the quality data associated with the providers was provided to:     Patient Representative Name:       The Patient and/or Patient Representative Agree with the Discharge Plan?       Thu Lopez RN  Case Management Department

## 2023-03-14 NOTE — CONSULTS
Department of General Surgery - Adult  Surgical Service colorectal surgery  Attending Consult Note      Reason for Consult: Left perirectal abscess      CHIEF COMPLAINT: Perirectal pain    History Obtained From:  patient, electronic medical record    HISTORY OF PRESENT ILLNESS:                The patient is a 50 y.o. female who presents with perirectal pain left. This has been present for the past week. She was seen in the emergency department which prompted a CT scan. She has a left perirectal abscess. She was admitted by the hospitalist service. I was asked to see her regarding drainage. Complains of pain in the left perirectal area. Past medical and surgical history was reviewed. CAT scan was reviewed with the patient. Lab values and clinical course since admission reviewed. Past Medical History:        Diagnosis Date    Bilateral chronic otitis media August 2015    DMII (diabetes mellitus, type 2) ([facility] [address] 2008    Fibroid, uterus     History of medication noncompliance     HTN (hypertension) 2009    Obesity (BMI 30-39. 9)      Past Surgical History:        Procedure Laterality Date    HYSTERECTOMY (CERVIX STATUS UNKNOWN)  2009    fibroids, Dr Magdaleno Ricardo, ovaries intact    MYRINGOTOMY AND TYMPANOSTOMY TUBE PLACEMENT Bilateral 8/13/2015    Dr. Jonathan Dinh     Current Medications:   Current Facility-Administered Medications: metoprolol tartrate (LOPRESSOR) tablet 50 mg, 50 mg, Oral, BID  valsartan (DIOVAN) tablet 320 mg, 320 mg, Oral, Daily  enoxaparin (LOVENOX) injection 40 mg, 40 mg, SubCUTAneous, Daily  ondansetron (ZOFRAN-ODT) disintegrating tablet 4 mg, 4 mg, Oral, Q8H PRN **OR** ondansetron (ZOFRAN) injection 4 mg, 4 mg, IntraVENous, Q6H PRN  polyethylene glycol (GLYCOLAX) packet 17 g, 17 g, Oral, Daily PRN  acetaminophen (TYLENOL) tablet 650 mg, 650 mg, Oral, Q6H PRN **OR** acetaminophen (TYLENOL) suppository 650 mg, 650 mg, Rectal, Q6H PRN  piperacillin-tazobactam (ZOSYN) 3,375 mg in sodium chloride 0.9 % 50 mL IVPB (mini-bag), 3,375 mg, IntraVENous, Q8H  insulin lispro (HUMALOG) injection vial 0-8 Units, 0-8 Units, SubCUTAneous, Q4H  glucose chewable tablet 16 g, 4 tablet, Oral, PRN  dextrose bolus 10% 125 mL, 125 mL, IntraVENous, PRN **OR** dextrose bolus 10% 250 mL, 250 mL, IntraVENous, PRN  glucagon (rDNA) injection 1 mg, 1 mg, SubCUTAneous, PRN  dextrose 10 % infusion, , IntraVENous, Continuous PRN  ketorolac (TORADOL) injection 30 mg, 30 mg, IntraVENous, Q6H PRN  hydrALAZINE (APRESOLINE) injection 10 mg, 10 mg, IntraVENous, Q4H PRN  0.9 % sodium chloride infusion, , IntraVENous, Continuous  Allergies:  Influenza vaccines    Social History:   TOBACCO:   reports that she quit smoking about 18 years ago. Her smoking use included cigarettes. She has a 15.00 pack-year smoking history. Her smokeless tobacco use includes chew. ETOH:   reports current alcohol use. DRUGS:   reports no history of drug use.   Family History:       Problem Relation Age of Onset    Diabetes Mother        REVIEW OF SYSTEMS:    CONSTITUTIONAL:  negative  EYES:  negative  HEENT:  negative  RESPIRATORY:  negative  CARDIOVASCULAR:  negative for  chest pain  GASTROINTESTINAL:  negative for nausea, vomiting, and abdominal pain  GENITOURINARY:  negative  HEMATOLOGIC/LYMPHATIC:  negative  MUSCULOSKELETAL:  negative  NEUROLOGICAL:  negative  BEHAVIOR/PSYCH:  negative    PHYSICAL EXAM:    VITALS:  BP (!) 147/67   Pulse (!) 108   Temp 97.9 °F (36.6 °C) (Oral)   Resp 16   Ht 5' 4\" (1.626 m)   Wt 196 lb 3.2 oz (89 kg)   SpO2 100%   BMI 33.68 kg/m²   CONSTITUTIONAL:  awake, alert, cooperative, no apparent distress, and appears stated age  EYES:  Lids and lashes normal, pupils equal, round and reactive to light, extra ocular muscles intact, sclera clear, conjunctiva normal  ENT:  Normocephalic, without obvious abnormality, atraumatic, sinuses nontender on palpation, external ears without lesions, oral pharynx with moist mucus membranes, tonsils without erythema or exudates, gums normal and good dentition.  NECK:  Supple, symmetrical, trachea midline, no adenopathy, thyroid symmetric, not enlarged and no tenderness, skin normal  HEMATOLOGIC/LYMPHATICS:  no cervical lymphadenopathy  BACK:  Symmetric, no curvature, spinous processes are non-tender on palpation, paraspinous muscles are non-tender on palpation, no costal vertebral tenderness  LUNGS:  No increased work of breathing, good air exchange, clear to auscultation bilaterally, no crackles or wheezing  CARDIOVASCULAR:  Normal apical impulse, regular rate and rhythm, normal S1 and S2, no S3 or S4, and no murmur noted  ABDOMEN: Soft nontender nondistended, fluctuant area left perirectal region.  Erythema present.  MUSCULOSKELETAL:  There is no redness, warmth, or swelling of the joints.  Full range of motion noted.  Motor strength is 5 out of 5 all extremities bilaterally.  Tone is normal.  NEUROLOGIC: Awake alert and oriented  SKIN:  no bruising or bleeding  DATA:    CBC:   Lab Results   Component Value Date/Time    WBC 11.2 03/13/2023 05:00 PM    RBC 5.37 03/13/2023 05:00 PM    HGB 12.2 03/13/2023 05:00 PM    HCT 38.1 03/13/2023 05:00 PM    MCV 71.0 03/13/2023 05:00 PM    MCH 22.8 03/13/2023 05:00 PM    MCHC 32.1 03/13/2023 05:00 PM    RDW 13.6 03/13/2023 05:00 PM     03/13/2023 05:00 PM    MPV 9.1 03/08/2015 07:09 AM     CMP:    Lab Results   Component Value Date/Time     03/13/2023 05:00 PM    K 4.3 03/13/2023 05:00 PM    CL 95 03/13/2023 05:00 PM    CO2 20 03/13/2023 05:00 PM    BUN 10 03/13/2023 05:00 PM    CREATININE 0.6 03/13/2023 05:34 PM    CREATININE 0.70 03/13/2023 05:00 PM    GFRAA >60.0 04/09/2020 08:30 AM    LABGLOM >60 03/13/2023 05:34 PM    GLUCOSE 288 03/13/2023 05:00 PM    GLUCOSE 119 02/24/2012 10:13 AM    PROT 8.3 03/13/2023 05:00 PM    LABALBU 3.8 03/13/2023 05:00 PM    LABALBU 4.4 02/24/2012 10:13 AM    CALCIUM 9.9 03/13/2023 05:00 PM    BILITOT 0.4  03/13/2023 05:00 PM    ALKPHOS 184 03/13/2023 05:00 PM    AST 10 03/13/2023 05:00 PM    ALT 15 03/13/2023 05:00 PM     Radiology Review: CT of the abdomen reviewed as described above    IMPRESSION/RECOMMENDATIONS:      Left perirectal abscess    Risks and benefits of incision and drainage for definitive treatment discussed. Risks of infection, bleeding, recurrence and postoperative pain all outlined. Nonoperative alternatives were given but not recommended    Patient wishes to proceed with incision and drainage. Scheduled for today. Patient NPO.

## 2023-03-14 NOTE — ANESTHESIA PRE PROCEDURE
Department of Anesthesiology  Preprocedure Note       Name:  Shai Gomez   Age:  50 y.o.  :  1970                                          MRN:  [id number]         Date:  3/14/2023      Surgeon: Teresa Downing):  Hamida Queen MD    Procedure: Procedure(s):  INCISION AND DRAINAGE PERIRECTAL ABSCESS ROOM 478    Medications prior to admission:   Prior to Admission medications    Medication Sig Start Date End Date Taking?  Authorizing Provider   valsartan (DIOVAN) 320 MG tablet Take 320 mg by mouth daily 22  Yes Historical Provider, MD   metoprolol tartrate (LOPRESSOR) 50 MG tablet Take 50 mg by mouth in the morning and at bedtime 23  Yes Historical Provider, MD   glimepiride (AMARYL) 2 MG tablet Take 2 mg by mouth 2 times daily (with meals) 22  Yes Historical Provider, MD   pioglitazone (ACTOS) 45 MG tablet Take 45 mg by mouth daily Every morning   Yes Historical Provider, MD   FREESTYLE LITE strip TEST TWICE DAILY 17   Trevor Farley DO   sitaGLIPtin (JANUVIA) 100 MG tablet TAKE 1 TABLET BY MOUTH DAILY 11/15/16   Trevor Farley DO   Blood Glucose Monitoring Suppl (FREESTYLE LITE) YULIYA 1 Device by Does not apply route daily as needed 16   Yanet Esparza MD   FREESTYLE LANCETS MISC 1 each by Does not apply route daily 16   Yanet Esparza MD       Current medications:    Current Facility-Administered Medications   Medication Dose Route Frequency Provider Last Rate Last Admin    metoprolol tartrate (LOPRESSOR) tablet 50 mg  50 mg Oral BID VIOLETTE Valles CNP   50 mg at 23 0919    valsartan (DIOVAN) tablet 320 mg  320 mg Oral Daily VIOLETTE Peters CNP   320 mg at 23 0919    enoxaparin (LOVENOX) injection 40 mg  40 mg SubCUTAneous Daily VIOLETTE Peters CNP        ondansetron (ZOFRAN-ODT) disintegrating tablet 4 mg  4 mg Oral Q8H PRN VIOLETTE Valles CNP        Or    ondansetron (ZOFRAN) injection 4 mg  4 mg IntraVENous Q6H PRN Yaritza Gould APRN - CNP   4 mg at 03/14/23 0205    polyethylene glycol (GLYCOLAX) packet 17 g  17 g Oral Daily PRN Loly Deems, APRN - CNP        acetaminophen (TYLENOL) tablet 650 mg  650 mg Oral Q6H PRN Loly Deems, APRN - CNP        Or    acetaminophen (TYLENOL) suppository 650 mg  650 mg Rectal Q6H PRN Loly Deems, APRN - CNP        piperacillin-tazobactam (ZOSYN) 3,375 mg in sodium chloride 0.9 % 50 mL IVPB (mini-bag)  3,375 mg IntraVENous Q8H Julita Casarez, APRN - CNP   Stopped at 03/14/23 0710    insulin lispro (HUMALOG) injection vial 0-8 Units  0-8 Units SubCUTAneous Q4H Julita Casarez, APRN - CNP   4 Units at 03/14/23 7588    glucose chewable tablet 16 g  4 tablet Oral PRN Loly Deems, APRN - CNP        dextrose bolus 10% 125 mL  125 mL IntraVENous PRN Loly Deems, APRN - CNP        Or    dextrose bolus 10% 250 mL  250 mL IntraVENous PRN Loly Deems, APRN - CNP        glucagon (rDNA) injection 1 mg  1 mg SubCUTAneous PRN Loly Deems, APRN - CNP        dextrose 10 % infusion   IntraVENous Continuous PRN Loly Deems, APRN - CNP        ketorolac (TORADOL) injection 30 mg  30 mg IntraVENous Q6H PRN Loly Deems, APRN - CNP   30 mg at 03/14/23 0025    hydrALAZINE (APRESOLINE) injection 10 mg  10 mg IntraVENous Q4H PRN Melinda Dale MD   10 mg at 03/14/23 0027    0.9 % sodium chloride infusion   IntraVENous Continuous Melinda Dale  mL/hr at 03/14/23 0024 New Bag at 03/14/23 0024       Allergies: Allergies   Allergen Reactions    Influenza Vaccines        Problem List:    Patient Active Problem List   Diagnosis Code    Obesity (BMI 30-39. 9) E66.9    History of medication noncompliance Z91.14    Essential hypertension, benign I10    Type 2 diabetes mellitus without complication (HCC) D62.6    Right wrist pain M25.531    Chronic cough R05.3    Palpitations R00.2    Alpha thalassemia trait D56.3    Muscle strain T14. 8XXA    Abscess L02.91       Past Medical History:        Diagnosis Date  Bilateral chronic otitis media 2015    DMII (diabetes mellitus, type 2) ([facility] [address]     Fibroid, uterus     History of medication noncompliance     HTN (hypertension) 2009    Obesity (BMI 30-39. 9)        Past Surgical History:        Procedure Laterality Date    HYSTERECTOMY (CERVIX STATUS UNKNOWN)  2009    fibroids, Dr Karishma Jose, ovaries intact    MYRINGOTOMY AND TYMPANOSTOMY TUBE PLACEMENT Bilateral 2015    Dr. Yvan Nielsen       Social History:    Social History     Tobacco Use    Smoking status: Former     Packs/day: 1.00     Years: 15.00     Pack years: 15.00     Types: Cigarettes     Quit date: 2005     Years since quittin.0    Smokeless tobacco: Current     Types: Chew   Substance Use Topics    Alcohol use: Yes     Alcohol/week: 0.0 standard drinks     Comment: occasional wine consumption not to excess                                Ready to quit: Not Answered  Counseling given: No      Vital Signs (Current):   Vitals:    23 0043 23 0442 23 0721 23 0919   BP: (!) 161/80 137/68 (!) 147/67 (!) 147/67   Pulse: (!) 111 (!) 104 (!) 108 (!) 108   Resp:       Temp:  98.8 °F (37.1 °C) 97.9 °F (36.6 °C)    TempSrc:   Oral    SpO2: 100% 100% 100%    Weight:       Height:                                                  BP Readings from Last 3 Encounters:   23 (!) 147/67   21 (!) 158/84   10/12/20 (!) 206/116       NPO Status:                                                                                 BMI:   Wt Readings from Last 3 Encounters:   23 196 lb 3.2 oz (89 kg)   21 220 lb (99.8 kg)   10/12/20 208 lb (94.3 kg)     Body mass index is 33.68 kg/m².     CBC:   Lab Results   Component Value Date/Time    WBC 11.7 2023 06:43 AM    RBC 4.86 2023 06:43 AM    HGB 11.0 2023 06:43 AM    HCT 34.1 2023 06:43 AM    MCV 70.2 2023 06:43 AM    RDW 13.7 2023 06:43 AM     2023 06:43 AM       CMP: Lab Results   Component Value Date/Time     03/14/2023 06:43 AM    K 4.3 03/14/2023 06:43 AM    CL 95 03/14/2023 06:43 AM    CO2 16 03/14/2023 06:43 AM    BUN 7 03/14/2023 06:43 AM    CREATININE 0.68 03/14/2023 06:43 AM    GFRAA >60.0 04/09/2020 08:30 AM    LABGLOM >60.0 03/14/2023 06:43 AM    GLUCOSE 309 03/14/2023 06:43 AM    GLUCOSE 119 02/24/2012 10:13 AM    PROT 8.3 03/13/2023 05:00 PM    CALCIUM 9.2 03/14/2023 06:43 AM    BILITOT 0.4 03/13/2023 05:00 PM    ALKPHOS 184 03/13/2023 05:00 PM    AST 10 03/13/2023 05:00 PM    ALT 15 03/13/2023 05:00 PM       POC Tests:   Recent Labs     03/14/23  0746   POCGLU 256*       Coags: No results found for: PROTIME, INR, APTT    HCG (If Applicable): No results found for: PREGTESTUR, PREGSERUM, HCG, HCGQUANT     ABGs: No results found for: PHART, PO2ART, YNP1NVH, LQX6BPK, BEART, [id number]     Type & Screen (If Applicable):  No results found for: LABABO, LABRH    Drug/Infectious Status (If Applicable):  No results found for: HIV, HEPCAB    COVID-19 Screening (If Applicable): No results found for: COVID19        Anesthesia Evaluation  Patient summary reviewed and Nursing notes reviewed no history of anesthetic complications:   Airway: Mallampati: II  TM distance: >3 FB   Neck ROM: full  Mouth opening: > = 3 FB   Dental: normal exam         Pulmonary:Negative Pulmonary ROS and normal exam                               Cardiovascular:    (+) hypertension:,                   Neuro/Psych:   (+) neuromuscular disease:,             GI/Hepatic/Renal:   (+) morbid obesity          Endo/Other:    (+) Diabetes, . Abdominal:   (+) obese,           Vascular: negative vascular ROS. Other Findings:           Anesthesia Plan      general     ASA 3 - emergent       Induction: intravenous. MIPS: Prophylactic antiemetics administered. Anesthetic plan and risks discussed with patient. Plan discussed with CRNA.     Attending anesthesiologist reviewed and agrees with Preprocedure content                Janee Yee MD   3/14/2023

## 2023-03-14 NOTE — PROGRESS NOTES
BS= 357, called Dr. Gisselle Frank ordered to have floor nurse to treat when awake and back to floor.

## 2023-03-14 NOTE — BRIEF OP NOTE
Brief Postoperative Note      Patient: Simi Temple  YOB: 1970  MRN: [id number]    Date of Procedure: 3/14/2023    Pre-Op Diagnosis: PERIRECTAL ABSCESS, left    Post-Op Diagnosis: Same       Procedure(s):  INCISION AND DRAINAGE PERIRECTAL ABSCESS, left    Surgeon(s):  Femi Spicer MD    Assistant:  First Assistant: Nikko Price    Anesthesia: General    Estimated Blood Loss (mL): 50    Complications: None    Specimens:   ID Type Source Tests Collected by Time Destination   1 : surgical culture perirectal abscess left Swab Perineum CULTURE, SURGICAL Femi Spicer MD 3/14/2023 1112        Implants:  * No implants in log *      Drains: * No LDAs found *    Findings: Left perirectal abscess    Electronically signed by Pily Vides MD on 3/14/2023 at 11:33 AM

## 2023-03-14 NOTE — ED NOTES
Report given to [facility] and Pedro Luis Huertas RN's at bedside     Mikael Cummings RN  03/13/23 2039

## 2023-03-15 PROBLEM — E11.65 POORLY CONTROLLED DIABETES MELLITUS (HCC): Status: ACTIVE | Noted: 2023-03-15

## 2023-03-15 LAB
ANION GAP SERPL CALCULATED.3IONS-SCNC: 12 MEQ/L (ref 9–15)
BASOPHILS ABSOLUTE: 0.1 K/UL (ref 0–0.2)
BASOPHILS RELATIVE PERCENT: 0.6 %
BUN SERPL-MCNC: 14 MG/DL (ref 6–20)
CALCIUM SERPL-MCNC: 9.4 MG/DL (ref 8.5–9.9)
CHLORIDE SERPL-SCNC: 100 MEQ/L (ref 95–107)
CO2 SERPL-SCNC: 21 MEQ/L (ref 20–31)
CREAT SERPL-MCNC: 0.82 MG/DL (ref 0.5–0.9)
EOSINOPHILS ABSOLUTE: 0.1 K/UL (ref 0–0.7)
EOSINOPHILS RELATIVE PERCENT: 0.5 %
GLUCOSE BLD-MCNC: 200 MG/DL (ref 70–99)
GLUCOSE BLD-MCNC: 212 MG/DL (ref 70–99)
GLUCOSE BLD-MCNC: 334 MG/DL (ref 70–99)
GLUCOSE BLD-MCNC: 351 MG/DL (ref 70–99)
GLUCOSE SERPL-MCNC: 311 MG/DL (ref 70–99)
HCT VFR BLD CALC: 31.6 % (ref 37–47)
HEMOGLOBIN: 10.2 G/DL (ref 12–16)
LYMPHOCYTES ABSOLUTE: 3.5 K/UL (ref 1–4.8)
LYMPHOCYTES RELATIVE PERCENT: 29.7 %
MCH RBC QN AUTO: 23 PG (ref 27–31.3)
MCHC RBC AUTO-ENTMCNC: 32.4 % (ref 33–37)
MCV RBC AUTO: 70.8 FL (ref 79.4–94.8)
MONOCYTES ABSOLUTE: 1.3 K/UL (ref 0.2–0.8)
MONOCYTES RELATIVE PERCENT: 11.2 %
NEUTROPHILS ABSOLUTE: 6.9 K/UL (ref 1.4–6.5)
NEUTROPHILS RELATIVE PERCENT: 58 %
PDW BLD-RTO: 13.7 % (ref 11.5–14.5)
PERFORMED ON: ABNORMAL
PLATELET # BLD: 270 K/UL (ref 130–400)
POTASSIUM SERPL-SCNC: 3.9 MEQ/L (ref 3.4–4.9)
RBC # BLD: 4.46 M/UL (ref 4.2–5.4)
SODIUM SERPL-SCNC: 133 MEQ/L (ref 135–144)
WBC # BLD: 11.9 K/UL (ref 4.8–10.8)

## 2023-03-15 PROCEDURE — 6370000000 HC RX 637 (ALT 250 FOR IP): Performed by: COLON & RECTAL SURGERY

## 2023-03-15 PROCEDURE — 99024 POSTOP FOLLOW-UP VISIT: CPT | Performed by: COLON & RECTAL SURGERY

## 2023-03-15 PROCEDURE — 1210000000 HC MED SURG R&B

## 2023-03-15 PROCEDURE — 6360000002 HC RX W HCPCS: Performed by: INTERNAL MEDICINE

## 2023-03-15 PROCEDURE — 99222 1ST HOSP IP/OBS MODERATE 55: CPT | Performed by: INTERNAL MEDICINE

## 2023-03-15 PROCEDURE — 2580000003 HC RX 258: Performed by: COLON & RECTAL SURGERY

## 2023-03-15 PROCEDURE — 6360000002 HC RX W HCPCS: Performed by: COLON & RECTAL SURGERY

## 2023-03-15 PROCEDURE — 85025 COMPLETE CBC W/AUTO DIFF WBC: CPT

## 2023-03-15 PROCEDURE — 80048 BASIC METABOLIC PNL TOTAL CA: CPT

## 2023-03-15 PROCEDURE — 6370000000 HC RX 637 (ALT 250 FOR IP): Performed by: INTERNAL MEDICINE

## 2023-03-15 PROCEDURE — 99213 OFFICE O/P EST LOW 20 MIN: CPT

## 2023-03-15 PROCEDURE — 36415 COLL VENOUS BLD VENIPUNCTURE: CPT

## 2023-03-15 RX ORDER — INSULIN GLARGINE 100 [IU]/ML
60 INJECTION, SOLUTION SUBCUTANEOUS NIGHTLY
Status: DISCONTINUED | OUTPATIENT
Start: 2023-03-16 | End: 2023-03-16

## 2023-03-15 RX ORDER — INSULIN LISPRO 100 [IU]/ML
0-16 INJECTION, SOLUTION INTRAVENOUS; SUBCUTANEOUS
Status: DISCONTINUED | OUTPATIENT
Start: 2023-03-15 | End: 2023-03-16

## 2023-03-15 RX ORDER — INSULIN LISPRO 100 [IU]/ML
10 INJECTION, SOLUTION INTRAVENOUS; SUBCUTANEOUS
Status: DISCONTINUED | OUTPATIENT
Start: 2023-03-15 | End: 2023-03-16

## 2023-03-15 RX ORDER — INSULIN GLARGINE 100 [IU]/ML
20 INJECTION, SOLUTION SUBCUTANEOUS 2 TIMES DAILY
Status: DISCONTINUED | OUTPATIENT
Start: 2023-03-15 | End: 2023-03-15

## 2023-03-15 RX ORDER — OXYCODONE HYDROCHLORIDE AND ACETAMINOPHEN 5; 325 MG/1; MG/1
1 TABLET ORAL EVERY 6 HOURS PRN
Qty: 12 TABLET | Refills: 0 | Status: SHIPPED | OUTPATIENT
Start: 2023-03-15 | End: 2023-03-18

## 2023-03-15 RX ORDER — INSULIN LISPRO 100 [IU]/ML
0-4 INJECTION, SOLUTION INTRAVENOUS; SUBCUTANEOUS NIGHTLY
Status: DISCONTINUED | OUTPATIENT
Start: 2023-03-15 | End: 2023-03-16

## 2023-03-15 RX ADMIN — ACETAMINOPHEN 650 MG: 325 TABLET ORAL at 04:26

## 2023-03-15 RX ADMIN — ALOGLIPTIN 25 MG: 12.5 TABLET, FILM COATED ORAL at 08:27

## 2023-03-15 RX ADMIN — METOPROLOL TARTRATE 50 MG: 50 TABLET, FILM COATED ORAL at 20:22

## 2023-03-15 RX ADMIN — LINEZOLID 600 MG: 600 INJECTION, SOLUTION INTRAVENOUS at 10:16

## 2023-03-15 RX ADMIN — GLIPIZIDE 5 MG: 5 TABLET ORAL at 08:25

## 2023-03-15 RX ADMIN — PIPERACILLIN AND TAZOBACTAM 3375 MG: 3; .375 INJECTION, POWDER, LYOPHILIZED, FOR SOLUTION INTRAVENOUS at 12:36

## 2023-03-15 RX ADMIN — INSULIN LISPRO 2 UNITS: 100 INJECTION, SOLUTION INTRAVENOUS; SUBCUTANEOUS at 16:25

## 2023-03-15 RX ADMIN — INSULIN LISPRO 8 UNITS: 100 INJECTION, SOLUTION INTRAVENOUS; SUBCUTANEOUS at 11:50

## 2023-03-15 RX ADMIN — VALSARTAN 320 MG: 160 TABLET, FILM COATED ORAL at 08:27

## 2023-03-15 RX ADMIN — LINEZOLID 600 MG: 600 INJECTION, SOLUTION INTRAVENOUS at 20:21

## 2023-03-15 RX ADMIN — GLIPIZIDE 5 MG: 5 TABLET ORAL at 16:25

## 2023-03-15 RX ADMIN — INSULIN GLARGINE 20 UNITS: 100 INJECTION, SOLUTION SUBCUTANEOUS at 11:51

## 2023-03-15 RX ADMIN — INSULIN LISPRO 6 UNITS: 100 INJECTION, SOLUTION INTRAVENOUS; SUBCUTANEOUS at 08:29

## 2023-03-15 RX ADMIN — PIPERACILLIN AND TAZOBACTAM 3375 MG: 3; .375 INJECTION, POWDER, LYOPHILIZED, FOR SOLUTION INTRAVENOUS at 22:53

## 2023-03-15 RX ADMIN — METOPROLOL TARTRATE 50 MG: 50 TABLET, FILM COATED ORAL at 08:25

## 2023-03-15 RX ADMIN — PIPERACILLIN AND TAZOBACTAM 3375 MG: 3; .375 INJECTION, POWDER, LYOPHILIZED, FOR SOLUTION INTRAVENOUS at 05:11

## 2023-03-15 RX ADMIN — OXYCODONE AND ACETAMINOPHEN 1 TABLET: 5; 325 TABLET ORAL at 11:05

## 2023-03-15 ASSESSMENT — ENCOUNTER SYMPTOMS
SHORTNESS OF BREATH: 0
NAUSEA: 0
DIARRHEA: 0
COUGH: 0
VOMITING: 0

## 2023-03-15 ASSESSMENT — PAIN DESCRIPTION - LOCATION
LOCATION: COCCYX
LOCATION: BUTTOCKS

## 2023-03-15 ASSESSMENT — PAIN SCALES - GENERAL
PAINLEVEL_OUTOF10: 0
PAINLEVEL_OUTOF10: 6
PAINLEVEL_OUTOF10: 4
PAINLEVEL_OUTOF10: 5

## 2023-03-15 ASSESSMENT — PAIN DESCRIPTION - DESCRIPTORS
DESCRIPTORS: ACHING;STABBING
DESCRIPTORS: ACHING

## 2023-03-15 ASSESSMENT — PAIN DESCRIPTION - ORIENTATION
ORIENTATION: LEFT
ORIENTATION: LEFT

## 2023-03-15 NOTE — PROGRESS NOTES
Infectious Disease           History of Present Illness:    Mild pain. No fevers      Physical Exam:     Blood pressure (!) 186/89, pulse (!) 101, temperature 99 °F (37.2 °C), temperature source Oral, resp. rate 18, height 5' 4\" (1.626 m), weight 196 lb 3.2 oz (89 kg), SpO2 97 %, not currently breastfeeding. General: Patient appears ok at the present time. NAD  Skin: no new rashes,   HEENT:  Neck is supple, No subconjunctival hemorrhages, no oral exudates  Heart: S1 S2  Lungs: clear bilaterally   Abdomen: soft, ND, NTTP,   Back :no CVA tenderness, area packed  Extrem: No edema, non tender  Neuro exam: CN II-XII intact  Psych: cooperative    Labs: I have reviewed all lab results by electronic record, including most recent CBC, metabolic panel, and pertinent abnormalities were addressed from an infectious disease perspective. Trends are being monitored over time. Lab Results   Component Value Date    WBC 11.9 (H) 03/15/2023    HGB 10.2 (L) 03/15/2023    HCT 31.6 (L) 03/15/2023    MCV 70.8 (L) 03/15/2023     03/15/2023     Lab Results   Component Value Date/Time     03/15/2023 05:40 AM    K 3.9 03/15/2023 05:40 AM     03/15/2023 05:40 AM    CO2 21 03/15/2023 05:40 AM    BUN 14 03/15/2023 05:40 AM    CREATININE 0.82 03/15/2023 05:40 AM    GLUCOSE 311 03/15/2023 05:40 AM    GLUCOSE 119 02/24/2012 10:13 AM    CALCIUM 9.4 03/15/2023 05:40 AM        Radiology:  I have reviewed imaging results per electronic record and most pertinent abnormalities are being addressed from an infectious disease standpoint.              ASSESSMENT:  Perirectal abscess  Leukocytosis  DM 2, poorly controlled      PLAN:  Zosyn  Zyvox  Await cultures

## 2023-03-15 NOTE — PROGRESS NOTES
Patient seen and examined    Wound care recommendations forthcoming    Patient can be discharged at the discretion hospitalist service. We will place a prescription for pain medication for postoperative pain.

## 2023-03-15 NOTE — PROGRESS NOTES
Shift assessment completed and medications given per MAR. Patient is alert and oriented, VSS. Patient has complaint of pain only when dressing change is being done. Call light within reach and bed in lowest position. Will continue to monitor.

## 2023-03-15 NOTE — CARE COORDINATION
IV BENEFIT REQUEST FORM    [facility]                        [address] [place]    REQUESTED BY: Electronically signed by Rustam Tyler RN on 3/15/2023 at 1:53 PM                                               RN/C3: PHONE: [phone]    DATE:/TIME OF REQUEST: 03/15/23  TIME: 1:53 PM      TO: [address] TO: [phone]    PHONE: [phone]     THIS PATIENT HAS BEEN IDENTIFIED TO POSSIBLY NEED LONG TERM IV'S. PLEASE CHECK INSURANCE COVERAGE FOR THE FOLLOWING PT/DRUGS. PATIENT'S NAME: Katherine Austingarten [place]                              [id number]   PATIENT'S : 1970  PATIENT ADDRESS: [place] [address]  SSN:      PAYOR NAME:  Payor: Judith Iyer / Plan: Venu [place] / Product Type: *No Product type* /     DRUG:ZYVOX DOSE:600 MG  FREQUENCY: Q 12HR    DRUG: ZOSYN DOSE: 3.375G FREQUENCY: Q8HR      __________ CHECK HERE IF PT HAS NO INSURANCE AND REQUESTING SELF PAY COST. *IF [facility] IS NOT A PROVIDER FOR THIS PATIENT, PLEASE FORWARD INFO VIA FAX TO [facility] @ [phone],(PHONE NUMBER: [phone]) TO RUN BENEFIT VERIFICATION AND NOTIFY THE ABOVE C3 OF THIS PLAN. (FAX FACE SHEET WITH DEMOGRAPHICS AND INSURANCE INFO WITH THIS FORM.)  PLEASE FAX BENEFIT INFO TO: [address]  [phone]    This message is intended only for the use of the individual or entity to which it is addressed and may contain information that is privileged, confidential, and exempt from disclosure under applicable law. If the reader of the notice is not intended recipient of the employee/agent responsible for delivering the message to the intended recipient, you are hereby notified than any dissemination, distribution or copying of this communication is strictly prohibited. Please contact the sender for further instructions on handling the information.

## 2023-03-15 NOTE — PROGRESS NOTES
Progress Note  Date:3/15/2023       Room:[place] [address]01  Patient Name:Jaqueline Schmidt     YOB: 1970     Age:52 y.o. Subjective    Subjective:  Symptoms:  She reports malaise and weakness. No shortness of breath, cough, chest pain, headache, chest pressure, anorexia, diarrhea or anxiety. Diet:  No nausea or vomiting. Review of Systems   Respiratory:  Negative for cough and shortness of breath. Cardiovascular:  Negative for chest pain. Gastrointestinal:  Negative for anorexia, diarrhea, nausea and vomiting. Neurological:  Positive for weakness. Objective         Vitals Last 24 Hours:  TEMPERATURE:  Temp  Av.4 °F (36.9 °C)  Min: 97.8 °F (36.6 °C)  Max: 99 °F (37.2 °C)  RESPIRATIONS RANGE: Resp  Av.9  Min: 12  Max: 20  PULSE OXIMETRY RANGE: SpO2  Av.9 %  Min: 95 %  Max: 100 %  PULSE RANGE: Pulse  Av.7  Min: 94  Max: 119  BLOOD PRESSURE RANGE: Systolic (52IIS), PWB:403 , Min:147 , HLX:949   ; Diastolic (54PQP), QJU:31, Min:72, Max:96    I/O (24Hr): Intake/Output Summary (Last 24 hours) at 3/15/2023 1005  Last data filed at 3/14/2023 1055  Gross per 24 hour   Intake 50 ml   Output --   Net 50 ml       Objective:  General Appearance:  Comfortable, well-appearing and in no acute distress. Vital signs: (most recent): Blood pressure (!) 186/89, pulse (!) 101, temperature 99 °F (37.2 °C), temperature source Oral, resp. rate 20, height 5' 4\" (1.626 m), weight 196 lb 3.2 oz (89 kg), SpO2 97 %, not currently breastfeeding. HEENT: Normal HEENT exam.    Lungs:  Breath sounds clear to auscultation. Heart: S1 normal and S2 normal.    Abdomen: Abdomen is soft. Bowel sounds are normal.   There is no epigastric area or suprapubic area tenderness. Neurological: Patient is alert. Pupils:  Pupils are equal, round, and reactive to light. Skin:  Warm.     Labs/Imaging/Diagnostics    Labs:  CBC:  Recent Labs     23  1700 23  0643 03/15/23  0540 WBC 11.2* 11.7* 11.9*   RBC 5.37 4.86 4.46   HGB 12.2 11.0* 10.2*   HCT 38.1 34.1* 31.6*   MCV 71.0* 70.2* 70.8*   RDW 13.6 13.7 13.7    278 270       CHEMISTRIES:  Recent Labs     03/13/23  1700 03/13/23  1734 03/14/23  0643 03/15/23  0540   *  --  131* 133*   K 4.3  --  4.3 3.9   CL 95  --  95 100   CO2 20  --  16* 21   BUN 10  --  7 14   CREATININE 0.70 0.6 0.68 0.82   GLUCOSE 288*  --  309* 311*       PT/INR:No results for input(s): PROTIME, INR in the last 72 hours. APTT:No results for input(s): APTT in the last 72 hours. LIVER PROFILE:  Recent Labs     03/13/23  1700   AST 10   ALT 15   BILITOT 0.4   ALKPHOS 184*         Imaging Last 24 Hours:  CT ABDOMEN PELVIS W IV CONTRAST Additional Contrast? None    Result Date: 3/13/2023  EXAMINATION: CT OF THE ABDOMEN AND PELVIS WITH CONTRAST 3/13/2023 6:57 pm TECHNIQUE: CT of the abdomen and pelvis was performed with the administration of intravenous contrast. Multiplanar reformatted images are provided for review. Automated exposure control, iterative reconstruction, and/or weight based adjustment of the mA/kV was utilized to reduce the radiation dose to as low as reasonably achievable. COMPARISON: None. HISTORY: ORDERING SYSTEM PROVIDED HISTORY: perirectal abscess TECHNOLOGIST PROVIDED HISTORY: PLEASE EXTEND TO PERINEAL AREA Reason for exam:->perirectal abscess Additional Contrast?->None Decision Support Exception - unselect if not a suspected or confirmed emergency medical condition->Emergency Medical Condition (MA) What reading provider will be dictating this exam?->CRC FINDINGS: Lower Chest: No infiltrates or pleural effusion. Small hiatal hernia. Organs: Liver is normal in size with no focal lesions. Gallbladder is present with no calcified stones. Spleen is not enlarged. Pancreas and adrenal glands appear unremarkable. There is symmetric enhancement of the kidneys with no hydronephrosis. GI/Bowel: No evidence of bowel obstruction.   Appendix is normal. Pelvis: Bladder is suboptimally distended. The uterus is surgically absent. There is no significant free fluid. Peritoneum/Retroperitoneum: No free air or significant adenopathy. Bones/Soft Tissues: There is 5.0 x 2.6 cm air containing collection in the left perianal/perirectal space, most compatible with abscess. 5.0 x 2.6 cm left perirectal/perianal abscess formation. No evidence of acute intra-abdominal or pelvic process. Assessment//Plan           Hospital Problems             Last Modified POA    * (Principal) Abscess 3/13/2023 Yes    Perirectal abscess 3/14/2023 Yes    Leukocytosis 3/14/2023 Yes    Diabetes mellitus due to underlying condition, uncontrolled, with hyperglycemia ([address] 3/14/2023 Yes   Sepsis   DM with hyperglycemia  HTN  Perirectal abscess  Assessment & Plan  3/14: Evaluated the patient with chaperone, going for I&D, consult infectious disease, follow hemoglobin A1c. Counseling was given about diabetes. Patient noncompliant at home. No new complaints. 3/15: Pt states she is feeling better,   Spoke with nursing about the care. Follow culture results. The patient already cleared by surgery to be discharged. Endocrinology evaluation for uncontrolled diabetes.   Hemoglobin A1c is 14  Electronically signed by Miquel Reynolds MD on 3/14/23 at 9:59 AM EDT

## 2023-03-15 NOTE — CARE COORDINATION
Met with patient, freedom of choice offered. States plan is return home with spouse, [facility] for dressing changes with packing. Iv benefit check sent if home iv abx needed . Will follow.

## 2023-03-15 NOTE — DISCHARGE INSTRUCTIONS
WOUND CARE INSTRUCTION:  Left perineum:  1) Cleanse wound with saline (or soap and water in shower at home); dry thoroughly  2) Lightly pack wound with saline moistened packing  3) Cover with 4x4 or ABD pad  4) Use mesh underwear to keep dressing in place  Change dressing daily and as needed    Medications are now to [facility] [employer] [facility]

## 2023-03-15 NOTE — PROGRESS NOTES
Wound Ostomy Continence Nurse  Consult Note       NAME:  Yenny Hernandez [facility]  MEDICAL RECORD NUMBER:  [id number]  AGE: 50 y.o. GENDER: female  : 1970  TODAY'S DATE:  3/15/2023    Subjective   Reason for [address]  Nurse Evaluation and Assessment: Left perineum wound, s/p I&D      Jaqueline Farah is a 50 y.o. female referred by:   [x] Physician  [] Nursing  [] Other:     Wound Identification:  Wound Type:  surgical, s/p I&D  Contributing Factors: diabetes and obesity    Wound History: Patient admitted to [facility] with an abscess to the left side of the perineum. Patient is POD #1, s/p I&D performed by Dr. Anival Doan. This  [address] nurse received referral for wound care recommendations. Current Wound Care Treatment:  Recommending daily dressing change consisting of the followin) cleanse wound with saline or soap and water, dry thoroughly 2) Lightly pack wound with saline moistened packing 3) Cover with 4x4 or ABD pad 4) Use mesh panties to keep dressing in place. Patient Goal of Care:  [x] Wound Healing  [] Odor Control  [] Palliative Care  [] Pain Control   [] Other:         PAST MEDICAL HISTORY        Diagnosis Date    Bilateral chronic otitis media 2015    DMII (diabetes mellitus, type 2) ([facility] [address]     Fibroid, uterus     History of medication noncompliance     HTN (hypertension)     Obesity (BMI 30-39. 9)        PAST SURGICAL HISTORY    Past Surgical History:   Procedure Laterality Date    HYSTERECTOMY (CERVIX STATUS UNKNOWN)  2009    fibroids, Dr Jaja Freeman, ovaries intact    MYRINGOTOMY AND TYMPANOSTOMY TUBE PLACEMENT Bilateral 2015    Dr. Bren Huff 3/14/2023    INCISION AND DRAINAGE PERIRECTAL ABSCESS performed by Ronna Maldonado MD at [address] [place] [address] HISTORY    Family History   Problem Relation Age of Onset    Diabetes Mother        SOCIAL HISTORY    Social History     Tobacco Use    Smoking status: Former     Packs/day: 1.00     Years: 15.00     Pack years: 15.00     Types: Cigarettes     Quit date: 2005     Years since quittin.0    Smokeless tobacco: Current     Types: Chew   Vaping Use    Vaping Use: Never used   Substance Use Topics    Alcohol use: Yes     Alcohol/week: 0.0 standard drinks     Comment: occasional wine consumption not to excess    Drug use: No       ALLERGIES    Allergies   Allergen Reactions    Hydralazine Nausea And Vomiting     tachycardia    Influenza Vaccines        MEDICATIONS    No current facility-administered medications on file prior to encounter.      Current Outpatient Medications on File Prior to Encounter   Medication Sig Dispense Refill    valsartan (DIOVAN) 320 MG tablet Take 320 mg by mouth daily      metoprolol tartrate (LOPRESSOR) 50 MG tablet Take 50 mg by mouth in the morning and at bedtime      glimepiride (AMARYL) 2 MG tablet Take 2 mg by mouth 2 times daily (with meals)      pioglitazone (ACTOS) 45 MG tablet Take 45 mg by mouth daily Every morning      FREESTYLE LITE strip TEST TWICE DAILY 100 strip 0    sitaGLIPtin (JANUVIA) 100 MG tablet TAKE 1 TABLET BY MOUTH DAILY 30 tablet 2    Blood Glucose Monitoring Suppl (FREESTYLE LITE) YULIYA 1 Device by Does not apply route daily as needed 1 Device 0    FREESTYLE LANCETS MISC 1 each by Does not apply route daily 100 each 3       Objective    BP (!) 186/89   Pulse (!) 101   Temp 99 °F (37.2 °C) (Oral)   Resp 18   Ht 5' 4\" (1.626 m)   Wt 196 lb 3.2 oz (89 kg)   SpO2 97%   BMI 33.68 kg/m²     LABS:  WBC:    Lab Results   Component Value Date/Time    WBC 11.9 03/15/2023 05:40 AM     H/H:    Lab Results   Component Value Date/Time    HGB 10.2 03/15/2023 05:40 AM    HCT 31.6 03/15/2023 05:40 AM     PTT:  No results found for: APTT, PTT[APTT}  PT/INR:  No results found for: PROTIME, INR  HgBA1c:    Lab Results   Component Value Date/Time    LABA1C 14.8 2023 06:43 AM       Assessment   Efraín Risk Score: Efraín Scale Score: 20    Patient Active Problem List   Diagnosis    Obesity (BMI 30-39. 9)    History of medication noncompliance    Essential hypertension, benign    Type 2 diabetes mellitus without complication (HCC)    Right wrist pain    Chronic cough    Palpitations    Alpha thalassemia trait    Muscle strain    Abscess    Perirectal abscess    Leukocytosis    Diabetes mellitus due to underlying condition, uncontrolled, with hyperglycemia ([facility] [address]       Measurements:     Incision 03/14/23 Perineum Left (Active)   Wound Image   03/15/23 1130   Dressing Status New dressing applied 03/15/23 1130   Dressing Change Due 03/16/23 03/15/23 1130   Incision Cleansed Cleansed with saline 03/15/23 1130   Dressing/Treatment Moist to dry;ABD pad;Mesh panties 03/15/23 1130   Incision Length (cm) 5 03/15/23 1130   Incision Width (cm) 1 cm 03/15/23 1130   Incision Depth (cm) 4 cm 03/15/23 1130   Drainage Amount Small 03/15/23 1130   Drainage Description Sanguinous 03/15/23 1130   Odor None 03/15/23 1130   Griselda-incision Assessment Edematous 03/15/23 1130   Number of days: 1     Assessment:    Offered to have patient premedicated prior to dressing change, patient refused. Current packing irrigated with saline, partially removed but patient was experiencing too much pain, requested pain medication at this time. Allowed time for the pain medication to kick in prior to removing the remaining packing. Packing removed. Measurements and photo documentation obtained. Wound bed is predominately red and granular with some stringy slough/debris present. Wound thoroughly irrigated with saline, dried with 4x4s. Wound bed lightly packed with saline moistened gauze- this will promote wound healing by secondary intent and also continue to pull remaining slough/debris out of wound bed. Packing secured with ABD pad. Patient provided supplies for wound care at home and a seat cushion for comfort and offloading.  Care team is aware that patient would benefit from Haja Holder to assist in training patient's  with wound care. Plan   Plan of Care:  see above    Specialty Bed Required : N/A   [] Low Air Loss   [] Pressure Redistribution  [] Fluid Immersion  [] Bariatric  [] Other:     Current Diet: ADULT DIET;  Regular  Dietician consult:  N/A    Discharge Plan:  Placement for patient upon discharge: home with support    Patient appropriate for Outpatient [address]: N/A- per discretion of Dr. Joanie Dandy    [facility]  []   [] [facility]  [] Supplies  [] Other    Patient/Caregiver Teaching:  Level of patient/caregiver understanding able to:   [] Indicates understanding       [] Needs reinforcement  [] Unsuccessful      [] Verbal Understanding  [] Demonstrated understanding       [] No evidence of learning  [] Refused teaching         [x] N/A       Electronically signed by Seabron Bosworth, BSN, RN, Alla Zuniga on 3/15/2023 at 12:24 PM

## 2023-03-15 NOTE — PROGRESS NOTES
Resumed care of patient at 80. Patient denied any needs and I continued to monitor. Call light is within reach and bed is in lowest position.  Electronically signed by Charlotte Ziegler RN on 3/15/2023 at 5:32 PM

## 2023-03-15 NOTE — FLOWSHEET NOTE
Patient is resting in bed,she is alert and oriented,she declined the offer of a pain medication,her left buttock incision packing is intact,the dressing was replaced for patient comfort. 19:40 inserted a no. 22 gauge angiocatheter for antibiotic infusion. she tolerated the iv insertion well.

## 2023-03-16 LAB
ANION GAP SERPL CALCULATED.3IONS-SCNC: 16 MEQ/L (ref 9–15)
BASOPHILS # BLD: 0.1 K/UL (ref 0–0.2)
BASOPHILS NFR BLD: 1 %
BUN SERPL-MCNC: 8 MG/DL (ref 6–20)
CALCIUM SERPL-MCNC: 9.2 MG/DL (ref 8.5–9.9)
CHLORIDE SERPL-SCNC: 95 MEQ/L (ref 95–107)
CO2 SERPL-SCNC: 22 MEQ/L (ref 20–31)
CREAT SERPL-MCNC: 0.9 MG/DL (ref 0.5–0.9)
EOSINOPHIL # BLD: 0.4 K/UL (ref 0–0.7)
EOSINOPHIL NFR BLD: 3.5 %
ERYTHROCYTE [DISTWIDTH] IN BLOOD BY AUTOMATED COUNT: 13.6 % (ref 11.5–14.5)
GLUCOSE BLD-MCNC: 128 MG/DL (ref 70–99)
GLUCOSE BLD-MCNC: 164 MG/DL (ref 70–99)
GLUCOSE BLD-MCNC: 249 MG/DL (ref 70–99)
GLUCOSE BLD-MCNC: 257 MG/DL (ref 70–99)
GLUCOSE SERPL-MCNC: 384 MG/DL (ref 70–99)
HCT VFR BLD AUTO: 33.4 % (ref 37–47)
HGB BLD-MCNC: 11 G/DL (ref 12–16)
LYMPHOCYTES # BLD: 3.3 K/UL (ref 1–4.8)
LYMPHOCYTES NFR BLD: 31.6 %
MAGNESIUM SERPL-MCNC: 1.5 MG/DL (ref 1.7–2.4)
MCH RBC QN AUTO: 23.2 PG (ref 27–31.3)
MCHC RBC AUTO-ENTMCNC: 33 % (ref 33–37)
MCV RBC AUTO: 70.2 FL (ref 79.4–94.8)
MONOCYTES # BLD: 1.1 K/UL (ref 0.2–0.8)
MONOCYTES NFR BLD: 10.9 %
NEUTROPHILS # BLD: 5.5 K/UL (ref 1.4–6.5)
NEUTS SEG NFR BLD: 53 %
PERFORMED ON: ABNORMAL
PLATELET # BLD AUTO: 299 K/UL (ref 130–400)
POTASSIUM SERPL-SCNC: 3.5 MEQ/L (ref 3.4–4.9)
RBC # BLD AUTO: 4.76 M/UL (ref 4.2–5.4)
SODIUM SERPL-SCNC: 133 MEQ/L (ref 135–144)
WBC # BLD AUTO: 10.4 K/UL (ref 4.8–10.8)

## 2023-03-16 PROCEDURE — 36415 COLL VENOUS BLD VENIPUNCTURE: CPT

## 2023-03-16 PROCEDURE — 6370000000 HC RX 637 (ALT 250 FOR IP): Performed by: INTERNAL MEDICINE

## 2023-03-16 PROCEDURE — 1210000000 HC MED SURG R&B

## 2023-03-16 PROCEDURE — 2580000003 HC RX 258: Performed by: COLON & RECTAL SURGERY

## 2023-03-16 PROCEDURE — 6360000002 HC RX W HCPCS: Performed by: COLON & RECTAL SURGERY

## 2023-03-16 PROCEDURE — 6360000002 HC RX W HCPCS: Performed by: INTERNAL MEDICINE

## 2023-03-16 PROCEDURE — 99232 SBSQ HOSP IP/OBS MODERATE 35: CPT | Performed by: INTERNAL MEDICINE

## 2023-03-16 PROCEDURE — 6370000000 HC RX 637 (ALT 250 FOR IP): Performed by: COLON & RECTAL SURGERY

## 2023-03-16 PROCEDURE — 83735 ASSAY OF MAGNESIUM: CPT

## 2023-03-16 PROCEDURE — 80048 BASIC METABOLIC PNL TOTAL CA: CPT

## 2023-03-16 PROCEDURE — 85025 COMPLETE CBC W/AUTO DIFF WBC: CPT

## 2023-03-16 RX ORDER — AMLODIPINE BESYLATE 5 MG/1
5 TABLET ORAL DAILY
Status: DISCONTINUED | OUTPATIENT
Start: 2023-03-16 | End: 2023-03-17 | Stop reason: HOSPADM

## 2023-03-16 RX ORDER — INSULIN LISPRO 100 [IU]/ML
13 INJECTION, SOLUTION INTRAVENOUS; SUBCUTANEOUS
Status: DISCONTINUED | OUTPATIENT
Start: 2023-03-16 | End: 2023-03-16

## 2023-03-16 RX ORDER — INSULIN LISPRO 100 [IU]/ML
0-4 INJECTION, SOLUTION INTRAVENOUS; SUBCUTANEOUS NIGHTLY
Status: DISCONTINUED | OUTPATIENT
Start: 2023-03-16 | End: 2023-03-17 | Stop reason: HOSPADM

## 2023-03-16 RX ORDER — FLUCONAZOLE 100 MG/1
200 TABLET ORAL DAILY
Status: DISCONTINUED | OUTPATIENT
Start: 2023-03-16 | End: 2023-03-17 | Stop reason: HOSPADM

## 2023-03-16 RX ORDER — INSULIN LISPRO 100 [IU]/ML
0-8 INJECTION, SOLUTION INTRAVENOUS; SUBCUTANEOUS
Status: DISCONTINUED | OUTPATIENT
Start: 2023-03-16 | End: 2023-03-17 | Stop reason: HOSPADM

## 2023-03-16 RX ORDER — MAGNESIUM SULFATE IN WATER 40 MG/ML
2000 INJECTION, SOLUTION INTRAVENOUS ONCE
Status: COMPLETED | OUTPATIENT
Start: 2023-03-16 | End: 2023-03-16

## 2023-03-16 RX ORDER — INSULIN LISPRO 100 [IU]/ML
10 INJECTION, SOLUTION INTRAVENOUS; SUBCUTANEOUS
Status: DISCONTINUED | OUTPATIENT
Start: 2023-03-17 | End: 2023-03-17 | Stop reason: HOSPADM

## 2023-03-16 RX ORDER — BENZONATATE 100 MG/1
100 CAPSULE ORAL 3 TIMES DAILY PRN
Status: DISCONTINUED | OUTPATIENT
Start: 2023-03-16 | End: 2023-03-17 | Stop reason: HOSPADM

## 2023-03-16 RX ORDER — L. ACIDOPHILUS/L.BULGARICUS 1MM CELL
4 TABLET ORAL 3 TIMES DAILY
Status: DISCONTINUED | OUTPATIENT
Start: 2023-03-16 | End: 2023-03-17 | Stop reason: HOSPADM

## 2023-03-16 RX ORDER — INSULIN GLARGINE 100 [IU]/ML
40 INJECTION, SOLUTION SUBCUTANEOUS NIGHTLY
Status: DISCONTINUED | OUTPATIENT
Start: 2023-03-16 | End: 2023-03-17 | Stop reason: HOSPADM

## 2023-03-16 RX ADMIN — METOPROLOL TARTRATE 50 MG: 50 TABLET, FILM COATED ORAL at 21:25

## 2023-03-16 RX ADMIN — INSULIN LISPRO 4 UNITS: 100 INJECTION, SOLUTION INTRAVENOUS; SUBCUTANEOUS at 08:51

## 2023-03-16 RX ADMIN — INSULIN GLARGINE 40 UNITS: 100 INJECTION, SOLUTION SUBCUTANEOUS at 21:44

## 2023-03-16 RX ADMIN — PIPERACILLIN AND TAZOBACTAM 3375 MG: 3; .375 INJECTION, POWDER, LYOPHILIZED, FOR SOLUTION INTRAVENOUS at 17:16

## 2023-03-16 RX ADMIN — VALSARTAN 320 MG: 160 TABLET, FILM COATED ORAL at 08:33

## 2023-03-16 RX ADMIN — PIPERACILLIN AND TAZOBACTAM 3375 MG: 3; .375 INJECTION, POWDER, LYOPHILIZED, FOR SOLUTION INTRAVENOUS at 23:26

## 2023-03-16 RX ADMIN — MAGNESIUM SULFATE HEPTAHYDRATE 2000 MG: 40 INJECTION, SOLUTION INTRAVENOUS at 15:24

## 2023-03-16 RX ADMIN — Medication 4 TABLET: at 21:24

## 2023-03-16 RX ADMIN — FLUCONAZOLE 200 MG: 100 TABLET ORAL at 08:49

## 2023-03-16 RX ADMIN — INSULIN LISPRO 10 UNITS: 100 INJECTION, SOLUTION INTRAVENOUS; SUBCUTANEOUS at 12:14

## 2023-03-16 RX ADMIN — OXYCODONE AND ACETAMINOPHEN 1 TABLET: 5; 325 TABLET ORAL at 10:23

## 2023-03-16 RX ADMIN — PIPERACILLIN AND TAZOBACTAM 3375 MG: 3; .375 INJECTION, POWDER, LYOPHILIZED, FOR SOLUTION INTRAVENOUS at 06:46

## 2023-03-16 RX ADMIN — INSULIN LISPRO 13 UNITS: 100 INJECTION, SOLUTION INTRAVENOUS; SUBCUTANEOUS at 17:17

## 2023-03-16 RX ADMIN — LINEZOLID 600 MG: 600 INJECTION, SOLUTION INTRAVENOUS at 21:20

## 2023-03-16 RX ADMIN — Medication 4 TABLET: at 08:49

## 2023-03-16 RX ADMIN — AMLODIPINE BESYLATE 5 MG: 5 TABLET ORAL at 15:22

## 2023-03-16 RX ADMIN — INSULIN LISPRO 8 UNITS: 100 INJECTION, SOLUTION INTRAVENOUS; SUBCUTANEOUS at 12:15

## 2023-03-16 RX ADMIN — METOPROLOL TARTRATE 50 MG: 50 TABLET, FILM COATED ORAL at 08:33

## 2023-03-16 RX ADMIN — OXYCODONE AND ACETAMINOPHEN 1 TABLET: 5; 325 TABLET ORAL at 15:22

## 2023-03-16 RX ADMIN — LINEZOLID 600 MG: 600 INJECTION, SOLUTION INTRAVENOUS at 10:26

## 2023-03-16 RX ADMIN — BENZONATATE 100 MG: 100 CAPSULE ORAL at 00:27

## 2023-03-16 RX ADMIN — Medication 4 TABLET: at 15:22

## 2023-03-16 RX ADMIN — INSULIN LISPRO 10 UNITS: 100 INJECTION, SOLUTION INTRAVENOUS; SUBCUTANEOUS at 08:50

## 2023-03-16 ASSESSMENT — PAIN SCALES - GENERAL
PAINLEVEL_OUTOF10: 5
PAINLEVEL_OUTOF10: 4
PAINLEVEL_OUTOF10: 2

## 2023-03-16 ASSESSMENT — ENCOUNTER SYMPTOMS
COUGH: 0
NAUSEA: 0
DIARRHEA: 0
SHORTNESS OF BREATH: 0
VOMITING: 0

## 2023-03-16 NOTE — CONSULTS
[address]                      [address], [address] [facility]                                  CONSULTATION    PATIENT NAME: Sidra Bradley                :        1970  MED REC NO:   [id number]                            ROOM:       [id number]  ACCOUNT NO:   [de-identified]                           ADMIT DATE: 2023  PROVIDER:     Zack Singh MD    CONSULT DATE:  03/15/2023    ENDOCRINE CONSULTATION    REFERRING PROVIDER:  Arron Kim MD    REASON FOR CONSULTATION:  Management of uncontrolled type 2 diabetes. CHIEF COMPLAINT AND HISTORY OF PRESENT ILLNESS:  The patient is a  61-year-old female with known history of type 2 diabetes with inadequate  control on three oral medication at home, Actos 45 mg daily, Januvia 100  mg daily plus glimepiride 2 mg; presenting with perianal abscess over  her buttocks. She had I and D done through surgery yesterday. Blood  sugars have been staying in the 300 range. The patient's hemoglobin A1c  was elevated at 14.8. Prior A1c over the last 3-4 years, have been  showing A1cs between 7.7 to 12 range at the [facility]. According  to the patient when she was testing her sugars, they have been running  high due to dietary compliance issues. Chemistries were reviewed. Sodium 133, potassium 3.9, chloride 100, CO2 of 21, BUN 14, creatinine  0.8. The patient is currently on Lantus 20 units twice a day plus  Humalog coverage. PAST MEDICAL HISTORY:  Significant for type 2 diabetes, history of  fibroid, hypertension, obesity. PAST SURGICAL HISTORY:  Hysterectomy, myringotomy and tympanoplasty, I  and D of perirectal abscess yesterday. FAMILY HISTORY:  Significant for diabetes. PERSONAL AND SOCIAL HISTORY:  Does smoke cigarettes. Chews tobacco.   Denies any substance abuse. ALLERGIES:  HYDRALAZINE, INFLUENZA.     MEDICATIONS:  Here included Lantus, Humalog, Lovenox, Zyvox, Lopressor,  Zosyn and Diovan. REVIEW OF SYSTEMS:  Other than the abscess and hyperglycemia, 14-point  review of systems was essentially unremarkable. PHYSICAL EXAMINATION:  GENERAL:  The patient is alert, awake, in no obvious distress. VITAL SIGNS:  Blood pressure was 186/89, pulse rate was between 194-100,  respiratory rate 18, temperature 99.9. HEENT:  Normocephalic, atraumatic. Pupils equal and tight oral mucosa  was moist.  NECK:  Supple. Trachea in midline. CHEST:  Lungs were clear to auscultation bilaterally. No wheezing or  crackles were heard. CARDIAC:  Heart sounds were normal.  No murmurs or thrills were present. ABDOMEN:  Soft, slightly obese. Bowel sounds were present. No  organomegaly. LOWER EXTREMITIES:  Reveal no edema. NEUROLOGIC:  Cranial nerves I through XII were intact. PSYCHIATRIC:  Showed normal affect, cognition. SKIN:  Intact. LABORATORY DATA:  As above. ASSESSMENT:  Uncontrolled diabetes, status post I and D of perianal  abscess, inadequate compliance on oral medication. PLAN:  Increase Lantus to 60 at night, Humalog 10 with each meals plus  high dose Humalog coverage. Monitor glycemic control closely. A1c goal  of 7 or lower. Emphasize the patient that she will be going home on  four insulin injections. No oral meds. Long-term A1c goal of 7 or  lower. Emphasize that blood sugars need to be also controlled to  promote wound healing, verbalized understanding. Total time spent 60 minutes.         Bertha Martines MD    D: 03/15/2023 17:14:26       T: 03/15/2023 17:16:48     [email or web address]  Job#: [id number]     Doc#: [id number]

## 2023-03-16 NOTE — PROGRESS NOTES
Progress Note  Date:3/16/2023       Room:[place] 801898-01  Patient Name:Jaqueline Schmidt     YOB: 1970     Age:52 y.o. Chief complaint uncontrolled type 2 diabetes    Subjective    Subjective:  Symptoms:  Stable. Diet:  Poor intake. Activity level: Impaired due to pain. Pain:  She complains of pain that is moderate. Review of Systems   Cardiovascular: Negative. Skin:  Positive for wound. All other systems reviewed and are negative. Objective         Vitals Last 24 Hours:  TEMPERATURE:  Temp  Av.5 °F (36.9 °C)  Min: 98.1 °F (36.7 °C)  Max: 98.8 °F (37.1 °C)  RESPIRATIONS RANGE: Resp  Av  Min: 18  Max: 18  PULSE OXIMETRY RANGE: SpO2  Av.8 %  Min: 99 %  Max: 100 %  PULSE RANGE: Pulse  Av.3  Min: 94  Max: 103  BLOOD PRESSURE RANGE: Systolic (40ZCP), RWL:058 , Min:135 , PGN:760   ; Diastolic (47ZKX), DOTTY:51, Min:72, Max:94    I/O (24Hr): Intake/Output Summary (Last 24 hours) at 3/16/2023 1718  Last data filed at 3/16/2023 1348  Gross per 24 hour   Intake 1740 ml   Output 0 ml   Net 1740 ml     Objective:  General Appearance:  Comfortable. Vital signs: (most recent): Blood pressure 135/76, pulse (!) 103, temperature 98.1 °F (36.7 °C), temperature source Oral, resp. rate 18, height 5' 4\" (1.626 m), weight 196 lb 3.2 oz (89 kg), SpO2 100 %, not currently breastfeeding. Vital signs are normal.    HEENT: Normal HEENT exam.    Lungs:  Normal effort and normal respiratory rate. Heart: Normal rate. Abdomen: Abdomen is soft. Neurological: Patient is alert and oriented to person, place and time. Skin:  No rash.    Labs/Imaging/Diagnostics    Labs:  CBC:  Recent Labs     23  0643 03/15/23  0540 23  0626   WBC 11.7* 11.9* 10.4   RBC 4.86 4.46 4.76   HGB 11.0* 10.2* 11.0*   HCT 34.1* 31.6* 33.4*   MCV 70.2* 70.8* 70.2*   RDW 13.7 13.7 13.6    270 299     CHEMISTRIES:  Recent Labs     23  0643 03/15/23  0540 23  0923   * 133* 133*   K 4.3 3.9 3.5   CL 95 100 95   CO2 16* 21 22   BUN 7 14 8   CREATININE 0.68 0.82 0.90   GLUCOSE 309* 311* 384*   MG  --   --  1.5*     PT/INR:No results for input(s): PROTIME, INR in the last 72 hours. APTT:No results for input(s): APTT in the last 72 hours. LIVER PROFILE:No results for input(s): AST, ALT, BILIDIR, BILITOT, ALKPHOS in the last 72 hours. Imaging Last 24 Hours:  No results found. Assessment//Plan           Hospital Problems             Last Modified POA    * (Principal) Abscess 3/13/2023 Yes    Perirectal abscess 3/14/2023 Yes    Leukocytosis 3/14/2023 Yes    Diabetes mellitus due to underlying condition, uncontrolled, with hyperglycemia ([facility] [address] 3/14/2023 Yes    Poorly controlled diabetes mellitus ([address] 3/15/2023 Yes     Assessment:    Condition: In stable condition. Unchanged. (Uncontrolled type 2 diabetes worsened due to perirectal abscess with high hemoglobin A1c at 14.8  Blood sugars are improving on Lantus and Humalog). Plan:    (Lower Lantus to 40 units at bedtime continue Humalog 10 units with each meals changed to medium dose sliding scale advised patient that she will be going home on 4 insulin injections daily long-term A1c goal of 7 or lower  Reviewed nursing consultants note total time spent was 25 minutes).      Electronically signed by Felix Spann MD on 3/16/23 at 5:18 PM EDT

## 2023-03-16 NOTE — PROGRESS NOTES
Infectious Disease           History of Present Illness:    Mild pain. No fevers      Physical Exam:     Blood pressure 135/76, pulse (!) 103, temperature 98.1 °F (36.7 °C), temperature source Oral, resp. rate 18, height 5' 4\" (1.626 m), weight 196 lb 3.2 oz (89 kg), SpO2 100 %, not currently breastfeeding. General: Patient appears ok at the present time. NAD  Skin: no new rashes,   HEENT:  Neck is supple, No subconjunctival hemorrhages, no oral exudates  Heart: S1 S2  Lungs: clear bilaterally   Abdomen: soft, ND, NTTP,   Back :no CVA tenderness, area packed  Extrem: No edema, non tender  Neuro exam: CN II-XII intact  Psych: cooperative    Labs: I have reviewed all lab results by electronic record, including most recent CBC, metabolic panel, and pertinent abnormalities were addressed from an infectious disease perspective. Trends are being monitored over time. Lab Results   Component Value Date    WBC 10.4 03/16/2023    HGB 11.0 (L) 03/16/2023    HCT 33.4 (L) 03/16/2023    MCV 70.2 (L) 03/16/2023     03/16/2023     Lab Results   Component Value Date/Time     03/16/2023 09:23 AM    K 3.5 03/16/2023 09:23 AM    CL 95 03/16/2023 09:23 AM    CO2 22 03/16/2023 09:23 AM    BUN 8 03/16/2023 09:23 AM    CREATININE 0.90 03/16/2023 09:23 AM    GLUCOSE 384 03/16/2023 09:23 AM    GLUCOSE 119 02/24/2012 10:13 AM    CALCIUM 9.2 03/16/2023 09:23 AM        Radiology:  I have reviewed imaging results per electronic record and most pertinent abnormalities are being addressed from an infectious disease standpoint.              ASSESSMENT:  Perirectal abscess  Leukocytosis  DM 2, poorly controlled      PLAN:  Zosyn  Zyvox  Await cultures one more day given severity of diabetes, growth may have been affected by previous outpatient antibiotics

## 2023-03-16 NOTE — PROGRESS NOTES
Progress Note  Date:3/16/2023       [facility]  Patient Name:Jaqueline Schmidt     YOB: 1970     Age:52 y.o. Subjective    Subjective:  Symptoms:  She reports malaise and weakness. No shortness of breath, cough, chest pain, headache, chest pressure, anorexia, diarrhea or anxiety. Diet:  No nausea or vomiting. Review of Systems   Respiratory:  Negative for cough and shortness of breath. Cardiovascular:  Negative for chest pain. Gastrointestinal:  Negative for anorexia, diarrhea, nausea and vomiting. Neurological:  Positive for weakness. Objective         Vitals Last 24 Hours:  TEMPERATURE:  Temp  Av.6 °F (37 °C)  Min: 98.2 °F (36.8 °C)  Max: 98.8 °F (37.1 °C)  RESPIRATIONS RANGE: Resp  Av  Min: 18  Max: 18  PULSE OXIMETRY RANGE: SpO2  Av.7 %  Min: 99 %  Max: 100 %  PULSE RANGE: Pulse  Av.3  Min: 94  Max: 102  BLOOD PRESSURE RANGE: Systolic (19FXB), JUAN JOSE:183 , Min:153 , LAP:829   ; Diastolic (09WOJ), KYX:67, Min:72, Max:94    I/O (24Hr): Intake/Output Summary (Last 24 hours) at 3/16/2023 1002  Last data filed at 3/16/2023 0915  Gross per 24 hour   Intake 1230 ml   Output 0 ml   Net 1230 ml       Objective:  General Appearance:  Comfortable, well-appearing and in no acute distress. Vital signs: (most recent): Blood pressure (!) 160/94, pulse (!) 102, temperature 98.8 °F (37.1 °C), temperature source Oral, resp. rate 18, height 5' 4\" (1.626 m), weight 196 lb 3.2 oz (89 kg), SpO2 99 %, not currently breastfeeding. HEENT: Normal HEENT exam.    Lungs:  Breath sounds clear to auscultation. Heart: S1 normal and S2 normal.    Abdomen: Abdomen is soft. Bowel sounds are normal.   There is no epigastric area or suprapubic area tenderness. Neurological: Patient is alert. Pupils:  Pupils are equal, round, and reactive to light. Skin:  Warm.     Labs/Imaging/Diagnostics    Labs:  CBC:  Recent Labs     23  0643 03/15/23  0540 23  6272 WBC 11.7* 11.9* 10.4   RBC 4.86 4.46 4.76   HGB 11.0* 10.2* 11.0*   HCT 34.1* 31.6* 33.4*   MCV 70.2* 70.8* 70.2*   RDW 13.7 13.7 13.6    270 299       CHEMISTRIES:  Recent Labs     03/13/23  1700 03/13/23  1734 03/14/23  0643 03/15/23  0540   *  --  131* 133*   K 4.3  --  4.3 3.9   CL 95  --  95 100   CO2 20  --  16* 21   BUN 10  --  7 14   CREATININE 0.70 0.6 0.68 0.82   GLUCOSE 288*  --  309* 311*       PT/INR:No results for input(s): PROTIME, INR in the last 72 hours. APTT:No results for input(s): APTT in the last 72 hours. LIVER PROFILE:  Recent Labs     03/13/23  1700   AST 10   ALT 15   BILITOT 0.4   ALKPHOS 184*         Imaging Last 24 Hours:  CT ABDOMEN PELVIS W IV CONTRAST Additional Contrast? None    Result Date: 3/13/2023  EXAMINATION: CT OF THE ABDOMEN AND PELVIS WITH CONTRAST 3/13/2023 6:57 pm TECHNIQUE: CT of the abdomen and pelvis was performed with the administration of intravenous contrast. Multiplanar reformatted images are provided for review. Automated exposure control, iterative reconstruction, and/or weight based adjustment of the mA/kV was utilized to reduce the radiation dose to as low as reasonably achievable. COMPARISON: None. HISTORY: ORDERING SYSTEM PROVIDED HISTORY: perirectal abscess TECHNOLOGIST PROVIDED HISTORY: PLEASE EXTEND TO PERINEAL AREA Reason for exam:->perirectal abscess Additional Contrast?->None Decision Support Exception - unselect if not a suspected or confirmed emergency medical condition->Emergency Medical Condition (MA) What reading provider will be dictating this exam?->CRC FINDINGS: Lower Chest: No infiltrates or pleural effusion. Small hiatal hernia. Organs: Liver is normal in size with no focal lesions. Gallbladder is present with no calcified stones. Spleen is not enlarged. Pancreas and adrenal glands appear unremarkable. There is symmetric enhancement of the kidneys with no hydronephrosis. GI/Bowel: No evidence of bowel obstruction. Appendix is normal. Pelvis: Bladder is suboptimally distended. The uterus is surgically absent. There is no significant free fluid. Peritoneum/Retroperitoneum: No free air or significant adenopathy. Bones/Soft Tissues: There is 5.0 x 2.6 cm air containing collection in the left perianal/perirectal space, most compatible with abscess. 5.0 x 2.6 cm left perirectal/perianal abscess formation. No evidence of acute intra-abdominal or pelvic process. Assessment//Plan           Hospital Problems             Last Modified POA    * (Principal) Abscess 3/13/2023 Yes    Perirectal abscess 3/14/2023 Yes    Leukocytosis 3/14/2023 Yes    Diabetes mellitus due to underlying condition, uncontrolled, with hyperglycemia ([address] 3/14/2023 Yes    Poorly controlled diabetes mellitus ([address] 3/15/2023 Yes   Sepsis   DM with hyperglycemia  HTN  Perirectal abscess  Assessment & Plan  3/14: Evaluated the patient with chaperone, going for I&D, consult infectious disease, follow hemoglobin A1c. Counseling was given about diabetes. Patient noncompliant at home. No new complaints. 3/15: Pt states she is feeling better,   Spoke with nursing about the care. Follow culture results. The patient already cleared by surgery to be discharged. Endocrinology evaluation for uncontrolled diabetes. Hemoglobin A1c is 14  3/16: Patient started on insulin due to uncontrolled diabetes. Appreciate endocrinology input. Follow-up cultures. Continue IV antibiotics. Patient feeling like getting  yeast infection. We will start fluconazole. Spoke with nursing about the care. No overnight events no new complaints. Continue current care.   Home health care was ordered,   Electronically signed by Arron Kim MD on 3/14/23 at 9:59 AM EDT

## 2023-03-16 NOTE — CARE COORDINATION
Met with patient , freedom of choice offered. Plan is home with [facility] home care if iv abx and wound care needed. Address is [address]. Patients antibiotics covered 100% for home.

## 2023-03-16 NOTE — PROGRESS NOTES
0915: Assessment complete. Alert and oriented x4, calm and cooperative. VSS room air. Independent. Griselda-rectal packing dry and intact. No complaints. Safety maintained. Call light within reach. 1100: Pt medicated for pain prior to dressing change. Dressing changed per order. Pt tolerated well.     1630: Pt had removed packing-- repacked at this time. Pt tolerated well.      Electronically signed by Renaldo Alvarez RN on 3/16/2023 at 9:17 AM

## 2023-03-17 VITALS
HEIGHT: 64 IN | SYSTOLIC BLOOD PRESSURE: 148 MMHG | DIASTOLIC BLOOD PRESSURE: 81 MMHG | HEART RATE: 95 BPM | OXYGEN SATURATION: 100 % | RESPIRATION RATE: 18 BRPM | BODY MASS INDEX: 33.49 KG/M2 | TEMPERATURE: 98.6 F | WEIGHT: 196.2 LBS

## 2023-03-17 LAB
ANION GAP SERPL CALCULATED.3IONS-SCNC: 12 MEQ/L (ref 9–15)
ANISOCYTOSIS BLD QL SMEAR: ABNORMAL
BASOPHILS # BLD: 0.1 K/UL (ref 0–0.2)
BASOPHILS NFR BLD: 1 %
BUN SERPL-MCNC: 4 MG/DL (ref 6–20)
CALCIUM SERPL-MCNC: 9 MG/DL (ref 8.5–9.9)
CHLORIDE SERPL-SCNC: 100 MEQ/L (ref 95–107)
CO2 SERPL-SCNC: 24 MEQ/L (ref 20–31)
CREAT SERPL-MCNC: 0.75 MG/DL (ref 0.5–0.9)
EOSINOPHIL # BLD: 0.2 K/UL (ref 0–0.7)
EOSINOPHIL NFR BLD: 3 %
ERYTHROCYTE [DISTWIDTH] IN BLOOD BY AUTOMATED COUNT: 13.5 % (ref 11.5–14.5)
GLUCOSE BLD-MCNC: 231 MG/DL (ref 70–99)
GLUCOSE BLD-MCNC: 247 MG/DL (ref 70–99)
GLUCOSE SERPL-MCNC: 219 MG/DL (ref 70–99)
HCT VFR BLD AUTO: 33.7 % (ref 37–47)
HGB BLD-MCNC: 10.8 G/DL (ref 12–16)
HYPOCHROMIA BLD QL SMEAR: ABNORMAL
LYMPHOCYTES # BLD: 2.8 K/UL (ref 1–4.8)
LYMPHOCYTES NFR BLD: 31 %
MAGNESIUM SERPL-MCNC: 1.9 MG/DL (ref 1.7–2.4)
MCH RBC QN AUTO: 22.3 PG (ref 27–31.3)
MCHC RBC AUTO-ENTMCNC: 32.1 % (ref 33–37)
MCV RBC AUTO: 69.6 FL (ref 79.4–94.8)
MICROCYTES BLD QL SMEAR: ABNORMAL
MONOCYTES # BLD: 0.5 K/UL (ref 0.2–0.8)
MONOCYTES NFR BLD: 5.8 %
NEUTROPHILS # BLD: 4.2 K/UL (ref 1.4–6.5)
NEUTS SEG NFR BLD: 54 %
PERFORMED ON: ABNORMAL
PERFORMED ON: ABNORMAL
PLATELET # BLD AUTO: 305 K/UL (ref 130–400)
PLATELET BLD QL SMEAR: NORMAL
POTASSIUM SERPL-SCNC: 3.3 MEQ/L (ref 3.4–4.9)
RBC # BLD AUTO: 4.85 M/UL (ref 4.2–5.4)
SODIUM SERPL-SCNC: 136 MEQ/L (ref 135–144)
VARIANT LYMPHS NFR BLD: 6 %
WBC # BLD AUTO: 7.7 K/UL (ref 4.8–10.8)

## 2023-03-17 PROCEDURE — 6370000000 HC RX 637 (ALT 250 FOR IP): Performed by: COLON & RECTAL SURGERY

## 2023-03-17 PROCEDURE — 2580000003 HC RX 258: Performed by: COLON & RECTAL SURGERY

## 2023-03-17 PROCEDURE — 6360000002 HC RX W HCPCS: Performed by: COLON & RECTAL SURGERY

## 2023-03-17 PROCEDURE — 85025 COMPLETE CBC W/AUTO DIFF WBC: CPT

## 2023-03-17 PROCEDURE — 6370000000 HC RX 637 (ALT 250 FOR IP): Performed by: INTERNAL MEDICINE

## 2023-03-17 PROCEDURE — 80048 BASIC METABOLIC PNL TOTAL CA: CPT

## 2023-03-17 PROCEDURE — 36415 COLL VENOUS BLD VENIPUNCTURE: CPT

## 2023-03-17 PROCEDURE — 83735 ASSAY OF MAGNESIUM: CPT

## 2023-03-17 PROCEDURE — G0108 DIAB MANAGE TRN  PER INDIV: HCPCS

## 2023-03-17 PROCEDURE — 6360000002 HC RX W HCPCS: Performed by: INTERNAL MEDICINE

## 2023-03-17 RX ORDER — INSULIN LISPRO 100 [IU]/ML
INJECTION, SOLUTION INTRAVENOUS; SUBCUTANEOUS
Qty: 10 ADJUSTABLE DOSE PRE-FILLED PEN SYRINGE | Refills: 3 | Status: SHIPPED | OUTPATIENT
Start: 2023-03-17

## 2023-03-17 RX ORDER — AMOXICILLIN AND CLAVULANATE POTASSIUM 875; 125 MG/1; MG/1
1 TABLET, FILM COATED ORAL 2 TIMES DAILY
Qty: 20 TABLET | Refills: 0 | Status: SHIPPED | OUTPATIENT
Start: 2023-03-17 | End: 2023-03-27

## 2023-03-17 RX ORDER — INSULIN GLARGINE 100 [IU]/ML
INJECTION, SOLUTION SUBCUTANEOUS
Qty: 10 ADJUSTABLE DOSE PRE-FILLED PEN SYRINGE | Refills: 3 | Status: SHIPPED | OUTPATIENT
Start: 2023-03-17

## 2023-03-17 RX ORDER — AMLODIPINE BESYLATE 5 MG/1
5 TABLET ORAL DAILY
Qty: 30 TABLET | Refills: 3 | Status: SHIPPED | OUTPATIENT
Start: 2023-03-18

## 2023-03-17 RX ORDER — FLUCONAZOLE 200 MG/1
200 TABLET ORAL DAILY
Qty: 3 TABLET | Refills: 0 | Status: SHIPPED | OUTPATIENT
Start: 2023-03-18 | End: 2023-03-21

## 2023-03-17 RX ORDER — POTASSIUM CHLORIDE 20 MEQ/1
40 TABLET, EXTENDED RELEASE ORAL ONCE
Status: COMPLETED | OUTPATIENT
Start: 2023-03-17 | End: 2023-03-17

## 2023-03-17 RX ADMIN — Medication 4 TABLET: at 09:25

## 2023-03-17 RX ADMIN — INSULIN LISPRO 2 UNITS: 100 INJECTION, SOLUTION INTRAVENOUS; SUBCUTANEOUS at 09:29

## 2023-03-17 RX ADMIN — INSULIN LISPRO 10 UNITS: 100 INJECTION, SOLUTION INTRAVENOUS; SUBCUTANEOUS at 09:29

## 2023-03-17 RX ADMIN — INSULIN LISPRO 10 UNITS: 100 INJECTION, SOLUTION INTRAVENOUS; SUBCUTANEOUS at 12:36

## 2023-03-17 RX ADMIN — FLUCONAZOLE 200 MG: 100 TABLET ORAL at 09:25

## 2023-03-17 RX ADMIN — VALSARTAN 320 MG: 160 TABLET, FILM COATED ORAL at 09:24

## 2023-03-17 RX ADMIN — OXYCODONE AND ACETAMINOPHEN 1 TABLET: 5; 325 TABLET ORAL at 12:35

## 2023-03-17 RX ADMIN — PIPERACILLIN AND TAZOBACTAM 3375 MG: 3; .375 INJECTION, POWDER, LYOPHILIZED, FOR SOLUTION INTRAVENOUS at 02:47

## 2023-03-17 RX ADMIN — INSULIN LISPRO 2 UNITS: 100 INJECTION, SOLUTION INTRAVENOUS; SUBCUTANEOUS at 12:35

## 2023-03-17 RX ADMIN — LINEZOLID 600 MG: 600 INJECTION, SOLUTION INTRAVENOUS at 06:42

## 2023-03-17 RX ADMIN — POTASSIUM CHLORIDE 40 MEQ: 1500 TABLET, EXTENDED RELEASE ORAL at 12:35

## 2023-03-17 RX ADMIN — METOPROLOL TARTRATE 50 MG: 50 TABLET, FILM COATED ORAL at 09:25

## 2023-03-17 RX ADMIN — AMLODIPINE BESYLATE 5 MG: 5 TABLET ORAL at 09:25

## 2023-03-17 RX ADMIN — OXYCODONE AND ACETAMINOPHEN 1 TABLET: 5; 325 TABLET ORAL at 02:03

## 2023-03-17 ASSESSMENT — PAIN SCALES - GENERAL
PAINLEVEL_OUTOF10: 3
PAINLEVEL_OUTOF10: 6

## 2023-03-17 NOTE — DISCHARGE INSTR - COC
Continuity of Care Form    Patient Name: Kathi Vasquez   :  1970  MRN:  [id number]    Admit date:  3/13/2023  Discharge date:  3/18/23    Code Status Order: Full Code   Advance Directives:   [facility] Documentation       Date/Time Healthcare Directive Type of Healthcare Directive Copy in [facility] [address] Agent's Name Healthcare Agent's Phone Number    23 1022 No, patient does not have an advance directive for healthcare treatment -- -- -- -- --            Admitting Physician: Diego Perez MD  PCP: Miki Flores MD    Discharging Nurse: [address] Unit/Room#: L860/G178-10  Discharging Unit Phone Number: [phone]    Emergency Contact:   Extended Emergency Contact Information  Primary Emergency Contact: Tiara Schmidt   [address] Phone: [phone]  Relation: Parent    Past Surgical History:  Past Surgical History:   Procedure Laterality Date    HYSTERECTOMY (CERVIX STATUS UNKNOWN)  2009    fibroids, Dr Surinder Moore, ovaries intact    MYRINGOTOMY AND TYMPANOSTOMY TUBE PLACEMENT Bilateral 2015    Dr. Rashmi Del Valle 3/14/2023    [address] performed by Rosita Maharaj MD at [facility]       Immunization History:   Immunization History   Administered Date(s) Administered    DTaP 1972, 1973, 1973, 1974, 1976       Active Problems:  Patient Active Problem List   Diagnosis Code    Obesity (BMI 30-39. 9) E66.9    History of medication noncompliance Z91.14    Essential hypertension, benign I10    Type 2 diabetes mellitus without complication (HCC) Y91.5    Right wrist pain M25.531    Chronic cough R05.3    Palpitations R00.2    Alpha thalassemia trait D56.3    Muscle strain T14. 8XXA    Abscess L02.91    Perirectal abscess K61.1    Leukocytosis D72.829    Diabetes mellitus due to underlying condition, uncontrolled, with hyperglycemia ([facility] [address] E08.65    Poorly controlled diabetes mellitus (HCC) E11.65       Isolation/Infection:   Isolation            No Isolation          Patient Infection Status       None to display            Nurse Assessment:  Last Vital Signs: BP (!) 148/81   Pulse 95   Temp 98.6 °F (37 °C) (Oral)   Resp 18   Ht 5' 4\" (1.626 m)   Wt 196 lb 3.2 oz (89 kg)   SpO2 100%   BMI 33.68 kg/m²     Last documented pain score (0-10 scale): Pain Level: 3  Last Weight:   Wt Readings from Last 1 Encounters:   03/13/23 196 lb 3.2 oz (89 kg)     Mental Status:  oriented and alert    IV Access:  - None    Nursing Mobility/ADLs:  Walking   Independent  Transfer  Independent  Bathing  Independent  Dressing  Independent  Toileting  Independent  Feeding  Independent  Med Admin  Independent  Med Delivery   whole    Wound Care Documentation and Therapy:  Incision 03/14/23 Perineum Left (Active)   Wound Image   03/15/23 1130   Dressing Status Intact 03/17/23 0914   Dressing Change Due 03/16/23 03/15/23 2159   Incision Cleansed Cleansed with saline 03/17/23 0210   Dressing/Treatment Moist to dry;ABD pad;Mesh panties 03/17/23 0210   Incision Length (cm) 5 03/15/23 1130   Incision Width (cm) 1 cm 03/15/23 1130   Incision Depth (cm) 4 cm 03/15/23 1130   Drainage Amount Small 03/15/23 2159   Drainage Description Sanguinous 03/15/23 1130   Odor None 03/15/23 2159   Griselda-incision Assessment Edematous 03/15/23 1130   Number of days: 3        Elimination:  Continence:   Bowel: Yes  Bladder: Yes  Urinary Catheter: None   Colostomy/Ileostomy/Ileal Conduit: No       Date of Last BM: 3/16    Intake/Output Summary (Last 24 hours) at 3/17/2023 1251  Last data filed at 3/17/2023 0913  Gross per 24 hour   Intake 2735.12 ml   Output 0 ml   Net 2735.12 ml     I/O last 3 completed shifts:  In: 3355.1 [P.O.:2220; I.V.:30; IV Piggyback:1105.1]  Out: 0     Safety Concerns:     None    Impairments/Disabilities:      None    Nutrition Therapy:  Current Nutrition Therapy:  General Diabetic diet    Routes of Feeding: Oral  Liquids: Thin Liquids  Daily Fluid Restriction: no  Last Modified Barium Swallow with Video (Video Swallowing Test): not done    Treatments at the Time of Hospital Discharge:   Respiratory Treatments: N/A  Oxygen Therapy:  is not on home oxygen therapy. Ventilator:    - No ventilator support    Rehab Therapies: N/A  Weight Bearing Status/Restrictions: No weight bearing restrictions  Other Medical Equipment (for information only, NOT a DME order):  N/A  Other Treatments: N/A    Patient's personal belongings (please select all that are sent with patient):    WOUND CARE INSTRUCTION:  Left perineum:  1) Cleanse wound with saline (or soap and water in shower at home); dry thoroughly  2) Lightly pack wound with saline moistened packing  3) Cover with 4x4 or ABD pad  4) Use mesh underwear to keep dressing in place  Change dressing daily and as needed    RN SIGNATURE:  Electronically signed by Sandy So RN on 3/17/2023 at 12:54 PM    CASE MANAGEMENT/SOCIAL WORK SECTION    Inpatient Status Date: ***    Readmission Risk Assessment Score:  Readmission Risk              Risk of Unplanned Readmission:  12           Discharging to Facility/ Agency   Name:   Address:  Phone:  Fax:    Dialysis Facility (if applicable)   Name:  Address:  Dialysis Schedule:  Phone:  Fax:    / signature: {Esignature:[id number]}    PHYSICIAN SECTION    Prognosis: {Prognosis:3947554077}    Condition at Discharge: [address] Patient Condition:259254679}    Rehab Potential (if transferring to Rehab): {Prognosis:2545872280}    Recommended Labs or Other Treatments After Discharge: ***    Physician Certification: I certify the above information and transfer of Brea Gaytan  is necessary for the continuing treatment of the diagnosis listed and that she requires {Admit to Appropriate Level of Care:72401} for {GREATER/LESS:576459757} 30 days.      Update Admission H&P: {CHP DME Changes in YLJRV:453342862}    PHYSICIAN SIGNATURE:  {Esignature:[id number]}

## 2023-03-17 NOTE — PROGRESS NOTES
CLINICAL PHARMACY NOTE: MEDS TO BEDS    Total # of Prescriptions Filled: 3   The following medications were delivered to the patient:  Amoxicillin-POT-Clav 875/125 mg tab  Fluconazole 200 mg tab  Amlodipine 5 mg tab     Additional Documentation:

## 2023-03-17 NOTE — PROGRESS NOTES
1000: D/c in per Dr. Cynthia Harris. Call to DM ed. Awaiting. [employer] to Dr. Fiona Conroy regarding reconciling insulin. 1100: DM ed met with pt-- all questions answered. Pt has card to contact them if needed. 1300: Pt requested insulin be transferred from [facility] on [facility]-- Medications are now to be at [facility] [address], [address]. Pt given pain medication for packing to be changed with education prior to d/c. Outpt pharmacy meds delivered and in pt possession. Supplies given for dressing change with education provided. Return to work slip given to pt.     1400: Discharge instructions provided to patient. Verbalized understanding of follow up appointments, diet, activity, wound care, medications, and reasons to return to ED/call physician. All questions answered. Copy of discharge instructions provided. IV removed without complication. Pt tolerated well. Catheter intact, dressing applied. No drainage noted. Assisted into wheelchair and taken to personal car. Denies further needs.      Electronically signed by Miki Johnson RN on 3/17/2023 at 10:07 AM

## 2023-03-17 NOTE — CARE COORDINATION
Quality round completed with care management team. LSW meet with pt at bedside. Discuss DC plan with pt. Pt agree with Xuan Horn with [facility] and [facility]. LSW will follow.      Electronically signed by GAVIN Ott on 3/17/2023 at 11:02 AM

## 2023-03-17 NOTE — DISCHARGE SUMMARY
Discharge Summary    Date: 3/17/2023  Patient Name: Pamela Feng    YOB: 1970     Age: 50 y.o. Admit Date: 3/13/2023  Discharge Date: 3/17/2023  Discharge Condition: [address]    Admission Diagnosis  Perirectal abscess [K61.1]; Abscess [L02.91]      Discharge Diagnosis  Principal Problem:    Abscess  Active Problems:    Perirectal abscess    Leukocytosis    Diabetes mellitus due to underlying condition, uncontrolled, with hyperglycemia ([address]    Poorly controlled diabetes mellitus ([address]  Resolved Problems:    * No resolved hospital problems. [facility] Stay  Narrative of Hospital Course:  Patient comes with perirectal abscess, status post I&D. Patient hemoglobin A1c was checked due to uncontrolled diabetes which was 14. Patient was eval by endocrinology. Started on insulin. Patient will be discharged on insulin. Diabetic teaching was done. Patient will be discharged on p.o. Augmentin as per ID recommendation. Patient has some itching and vaginal discharge due to yeast infection we will give patient few more days of fluconazole to finish the course. Consultants:  IP CONSULT TO GENERAL SURGERY  IP CONSULT TO GENERAL SURGERY  IP CONSULT TO INFECTIOUS DISEASES  IP CONSULT TO ENDOCRINOLOGY  IP CONSULT TO DIABETES EDUCATOR  IP CONSULT TO HOME CARE NEEDS    Surgeries/procedures Performed:      Treatments:            Discharge Plan/Disposition:  Home    Hospital/Incidental Findings Requiring Follow Up:    Patient Instructions:    Diet:    Activity:  For number of days (if applicable): Other Instructions:    Provider Follow-Up:   No follow-ups on file. Significant Diagnostic Studies:    Recent Labs:  Admission on 03/13/2023  No results displayed because visit has over 200 results. ------------    Radiology last 7 days:  CT ABDOMEN PELVIS W IV CONTRAST Additional Contrast? None    Result Date: 3/13/2023  5.0 x 2.6 cm left perirectal/perianal abscess formation.  No evidence of acute intra-abdominal or pelvic process. Pending Labs     Order Current Status    Culture, Blood 1 Preliminary result    Culture, Blood 2 Preliminary result    Culture, Surgical Preliminary result        Discharge Medications    Current Discharge Medication List    START taking these medications    fluconazole (DIFLUCAN) 200 MG tablet  Take 1 tablet by mouth daily for 3 doses  Qty: 3 tablet Refills: 0    amLODIPine (NORVASC) 5 MG tablet  Take 1 tablet by mouth daily  Qty: 30 tablet Refills: 3    amoxicillin-clavulanate (AUGMENTIN) 875-125 MG per tablet  Take 1 tablet by mouth 2 times daily for 10 days  Qty: 20 tablet Refills: 0    insulin glargine (LANTUS SOLOSTAR) 100 UNIT/ML injection pen  40 units at bedtime  Qty: 10 Adjustable Dose Pre-filled Pen Syringe Refills: 3    insulin lispro, 1 Unit Dial, (HUMALOG KWIKPEN) 100 UNIT/ML SOPN  10 units with each meals for glucose more than 120  Qty: 10 Adjustable Dose Pre-filled Pen Syringe Refills: 3    Insulin Pen Needle 32G X 4 MM MISC  1 each by Does not apply route 4 times daily  Qty: 200 each Refills: 3    oxyCODONE-acetaminophen (PERCOCET) 5-325 MG per tablet  Take 1 tablet by mouth every 6 hours as needed for Pain for up to 3 days. Intended supply: 3 days.  Take lowest dose possible to manage pain Max Daily Amount: 4 tablets  Qty: 12 tablet Refills: 0  Comments: Reduce doses taken as pain becomes manageable  Associated Diagnoses:Perirectal abscess          Current Discharge Medication List        Current Discharge Medication List    CONTINUE these medications which have NOT CHANGED    valsartan (DIOVAN) 320 MG tablet  Take 320 mg by mouth daily    metoprolol tartrate (LOPRESSOR) 50 MG tablet  Take 50 mg by mouth in the morning and at bedtime    FREESTYLE LITE strip  TEST TWICE DAILY  Qty: 100 strip Refills: 0  Associated Diagnoses:Type II diabetes mellitus, uncontrolled    Blood Glucose Monitoring Suppl (FREESTYLE LITE) YULIYA  1 Device by Does not apply route daily as needed  Qty: 1 Device Refills: 0  Associated Diagnoses:Type II diabetes mellitus, uncontrolled    FREESTYLE LANCETS MISC  1 each by Does not apply route daily  Qty: 100 each Refills: 3  Associated Diagnoses:Type II diabetes mellitus, uncontrolled          Current Discharge Medication List    STOP taking these medications    glimepiride (AMARYL) 2 MG tablet  Comments:  Reason for Stopping:    pioglitazone (ACTOS) 45 MG tablet  Comments:  Reason for Stopping:    ondansetron (ZOFRAN ODT) 4 MG disintegrating tablet  Comments:  Reason for Stopping:    cetirizine (ZYRTEC) 10 MG tablet  Comments:  Reason for Stopping:    albuterol sulfate HFA (VENTOLIN HFA) 108 (90 Base) MCG/ACT inhaler  Comments:  Reason for Stopping:    ibuprofen (ADVIL;MOTRIN) 800 MG tablet  Comments:  Reason for Stopping:    metoprolol succinate (TOPROL XL) 100 MG extended release tablet  Comments:  Reason for Stopping:    hydrALAZINE (APRESOLINE) 25 MG tablet  Comments:  Reason for Stopping:    losartan-hydrochlorothiazide (HYZAAR) 100-25 MG per tablet  Comments:  Reason for Stopping:    Loratadine-Pseudoephedrine (LORATADINE-D 24HR PO)  Comments:  Reason for Stopping:    sitaGLIPtin (JANUVIA) 100 MG tablet  Comments:  Reason for Stopping:    metFORMIN (GLUCOPHAGE XR) 500 MG extended release tablet  Comments:  Reason for Stopping:    Multiple Vitamins-Minerals (MULTIVITAMIN ADULT PO)  Comments:  Reason for Stopping:          Time Spent on Discharge:  minutes were spent in patient examination, evaluation, counseling as well as medication reconciliation, prescriptions for required medications, discharge plan, and follow up.     Electronically signed by Charli Galarza MD on 3/17/23 at 11:29 AM EDT   DC summary was 45 min  Overtime on dc summary was 45 min

## 2023-03-17 NOTE — PROGRESS NOTES
Shift assessment complete. Pt is ANOx4 on room air,  up independent. Dressing on Buttocks is CDI. Patient doesn't have an complaints.

## 2023-03-17 NOTE — PROGRESS NOTES
Kathi Vasquez, seen for evaluation and education on Type 2 uncontrolled. Home going on insulin    Lab Results   Component Value Date    LABA1C 14.8 (H) 03/14/2023     No results found for: Garth Garcia has had diabetes for many years. Discussed with Kathi Vasquez, their current diabetes self-care routines prior to this admission. medication compliance: noncompliant much of the time, diabetic diet compliance: noncompliant much of the time, home glucose monitoring: is performed sporadically    Survival Skill Topics discussed:  [x] Type 2 Diabetes diagnosis as chronic and progressive  [] Type 1 Diabetes diagnosis    [x] Eating healthy - [] plate method [] portion control [] label reading [] carb counting  [] sources of carbohydrates   Assessment of current status: [] Needs instruction [] Needs review   [] Comprehends key points  [] Demonstrates understanding/competence  [x] Not covered    [x] Being active - current recommendations and safety   Assessment of current status: [] Needs instruction [] Needs review   [] Comprehends key points  [] Demonstrates understanding/competence  [] Not covered    [x] Medications - current medications: action, side effects, precautions   Patient's medications: ______rapid and long acting insulin to be rx'd at discharge__________________________________  Assessment of current status: [] Needs instruction [] Needs review   [] Comprehends key points  [] Demonstrates understanding/competence  [] Not covered    [x] Monitoring - frequency & targets  instructed  fasting and before meals  Assessment of current status: [] Needs instruction [x] Needs review   [] Comprehends key points  [] Demonstrates understanding/competence  [] Not covered    [x] Signs and symptoms of hypo/hyperglycemia,  and Rule of 15 - handouts provided.    Assessment of current status: [] Needs instruction [] Needs review   [x] Comprehends key points  [] Demonstrates understanding/competence  [] Not covered    [x] Sick day management - handout provided  [] Testing for ketones   Assessment of current status: [] Needs instruction [] Needs review   [] Comprehends key points  [] Demonstrates understanding/competence  [x] Not covered    [x] Overview of chronic complications and how to prevent them from occurring  Assessment of current status: [] Needs instruction [] Needs review   [] Comprehends key points  [] Demonstrates understanding/competence  [x] Not covered      [] Use of injectables -        [] Insulin        [] GLP - 1        [] Previous use - Patient currently taking:                        Assessment of current status: [] Needs instruction [] Needs review   [] Comprehends key points  [] Demonstrates understanding/competence  [] Not covered    Reviewed the following   [] Type of insulin - onset, peak and duration of each type   [] GLP actions       [] Injection site - currently uses: [] abd  [] thighs []  upper arms  []  Inspected site for bruising, redness, infection, lipoatrophy  or   lipohypertrophy.       [] no problems                 [] problem:   [] Not injecting near umbilicus or scars/tattoos. [] Injecting near   umbilicus or scar/tattoo and advised to avoid these areas      [] Rotates sites appropriately [] Not rotating sites and advised    [] Proper storage of medicine  [] Stores appropriately  [] Does not store appropriately and reinstructed    [] History of hypoglycemia within last month? [] Yes. Was it treated appropriately? [] yes   [] no and reinstructed on proper treatment  [] No           [x] New to injectable  Patient has been prescribed:        [x] Instructed today on type of insulin(s), onset, peak and duration. Rapid and long acting and noted the information on each in the survival skills packet. [] Instructed today on GLP1 injectable actions          [x]  Demonstrated proper administration technique using    []Vial & syringe  [x]Pen.            [x] Return demonstration via [] Self-injection __U Site:____      [x] demonstrator pillow. [] Verbally        [x]  Reviewed recommended injection sites, proper storage of insulin, Proper needle disposal, importance of blood glucose monitoring when taking insulin, and risk of hypoglycemia. [x]  Handouts given.       [] Use of meter:   [] Meter Type:              [] CGM    []  Previous use:  [] Daily [] weekly [] monthly [] other:__________  [] Once a day [] twice a day [] 3 times a day [] 4 times a day or more  [] Before meal [] 2 hours after meal    [] Reviewed technique and patient uses meter:  [] Appropriately  [] Patient was not using appropriately and advised   [] CGM   [] CGM sensor   []Changes every ___ days  (usually 3-7 days)  [] Rotates appropriately  [] Uses approved sites for insertion  [] No signs of infection or lipodystrophy     []  Blood Glucose testing Type of meter:    [] Patient is testing ____ times a day     [] Testing to calibrate as needed  [] Checks blood glucose before driving and has a fast acting carb available     [] Has glucagon kit and significant other knows how to use it    [] Backup plan for pump failure:  [] Has written copy of current pump settings to include all of the following:   Basal: rates, times, max basal rate, type of temporary basal   Alarms: type of alarm, low reservoir volume, hours to pod expiration (Omnipod only), auto-off hours (if using that setting)   Sensor: transmitter ID, high alarm, low alarm, high snooze time, low snooze time, low threshold suspend value (if available)   Bolus: carb ratios and times, sensitivity (correction) factor and times, target blood glucose (BG) and times, insulin on board (active insulin) time, maximum bolus    [] Wrote down any changes to these settings at last appointment    [] Keeps a [] vial & syringes or [] insulin pen and pen needles on hand for pump failure   Or  [] has a prescription available                   [] Confirmed current long-acting insulin dose at last office visit. []Long acting insulin to be given:   Dose:                         []  Pen         []   Vial/syringe  [] Patient knows/verbalized it is to be taken immediately upon pump failure   [] If long acting insulin is taken, patient knows to wait until 24 hours after last injection of long acting insulin to restart insulin pump. [] Knows the telephone number of the insulin pump company to contact when pump fails    [] Troubleshooting and problem solving.   [] Knows potential causes of high and low blood glucose levels, including:   Catheter occlusion or dislodgement  Insulin degradation if exposed to temperature extremes  Battery failure  Missed doses  Over-correction of hyperglycemia  Pump malfunction  Incorrect pump programming of infusion rates or settings for date and time     [] Patient is new to glucose meter and instructed on the following.:        [] Overview of meter - 800 number on all machines for help       [] Proper storage/ handling of meter and test strips       [] Washing hands, turning on meter - setting date and time       [] Running  checks on the test strips using control solution        [] Loading and using lancet device to obtain an adequate sample       [] Obtaining blood sample and reading results on meter       [] Proper disposal of used strips and lancets       [] Frequency of testing       [] Importance of record keeping        [] When to call their PCM with abnormal results       [] Desired blood glucose goals for optimal control - handout given       [] All of the above                    [] Patient instructed on home meter. Name of meter:      [] Patient instructed with demonstrator model in office.        The patient return demonstrated:           [] verbally            [] self-tested BG ___________            SUPPORT MATERIALS  The following support materials were provided for patient to take home:  [x] Diabetes Education Wilfredo Bell          [] Self-care diary/log for blood glucose and food              [] Insulin how to kit          [] Diabetes ID card  [x] [facility] Diabetes Education brochure/ contact card      RECOMMENDATIONS INPATIENT PLAN:  [] Dietician Consult this admission for education on CHO diet and diet plan for home  [] Would recommend follow -up education at outpatient diabetes education at [facility]. [] An order has been placed for signature. Diabetes Education team will contact patient for appointment after discharge. [x] Patient declines education at this time. Education Brochure given for future reference  [] No further education is recommended at this time. Alin Desir was only interested in information on how to take her insulin. She said she knows what to do , has had all the education , she just wasn't doing it. I encouraged her to call us if she would like to pursue more education as an outpatient . RN Bedside Support Plan:  [x] Bedside RN to support patient with administration of insulin at bedside  [] Bedside RN to support patient with SMBG - OK to use home meter along-side floor meter  [] Reinforce target BG ranges, Hyper and Hypo signs and symptoms and treatment  [] Bedside RN to coordinate BG Check with mealtime and insulin  [] Bedside RN to ensure snack at [facility] for patient on HS insulin  [x] Bedside RN to reinforce survival skills education and diabetes self-care     Patient verbalizes and demonstrates understanding of survival skills education.

## 2023-03-17 NOTE — PROGRESS NOTES
Progress Note  Date:3/17/2023       Room:[place] [address]-01  Patient Name:Jaqueline Schmidt     YOB: 1970     Age:52 y.o. Chief complaint uncontrolled type 2 diabetes    Subjective    Subjective:  Symptoms:  Stable. Diet:  Adequate intake. Activity level: Returning to normal.    Pain:  She complains of pain that is mild. Review of Systems   Skin:  Positive for wound. All other systems reviewed and are negative. Objective         Vitals Last 24 Hours:  TEMPERATURE:  Temp  Av.7 °F (37.1 °C)  Min: 98.1 °F (36.7 °C)  Max: 99.5 °F (37.5 °C)  RESPIRATIONS RANGE: Resp  Av  Min: 18  Max: 18  PULSE OXIMETRY RANGE: SpO2  Av %  Min: 100 %  Max: 100 %  PULSE RANGE: Pulse  Av.3  Min: 95  Max: 103  BLOOD PRESSURE RANGE: Systolic (65ACV), MINA:702 , Min:135 , SKC:450   ; Diastolic (13XDZ), RSX:16, Min:76, Max:81    I/O (24Hr): Intake/Output Summary (Last 24 hours) at 3/17/2023 1427  Last data filed at 3/17/2023 1324  Gross per 24 hour   Intake 2835.12 ml   Output 0 ml   Net 2835.12 ml     Objective:  General Appearance:  Comfortable. Vital signs: (most recent): Blood pressure (!) 148/81, pulse 95, temperature 98.6 °F (37 °C), temperature source Oral, resp. rate 18, height 5' 4\" (1.626 m), weight 196 lb 3.2 oz (89 kg), SpO2 100 %, not currently breastfeeding. Vital signs are normal.    HEENT: Normal HEENT exam.    Lungs:  Normal effort and normal respiratory rate. Heart: Normal rate. Abdomen: Abdomen is soft. Extremities: Normal range of motion. Neurological: Patient is alert. Skin:  No rash.    Labs/Imaging/Diagnostics    Labs:  CBC:  Recent Labs     03/15/23  0540 23  0626 23  0559   WBC 11.9* 10.4 7.7   RBC 4.46 4.76 4.85   HGB 10.2* 11.0* 10.8*   HCT 31.6* 33.4* 33.7*   MCV 70.8* 70.2* 69.6*   RDW 13.7 13.6 13.5    299 305     CHEMISTRIES:  Recent Labs     03/15/23  0540 23  0923 23  0559   * 133* 136   K 3.9 3.5 3.3*   CL 100 95 100   CO2 21 22 24   BUN 14 8 4*   CREATININE 0.82 0.90 0.75   GLUCOSE 311* 384* 219*   MG  --  1.5* 1.9     PT/INR:No results for input(s): PROTIME, INR in the last 72 hours. APTT:No results for input(s): APTT in the last 72 hours. LIVER PROFILE:No results for input(s): AST, ALT, BILIDIR, BILITOT, ALKPHOS in the last 72 hours. Imaging Last 24 Hours:  No results found. Assessment//Plan           Hospital Problems             Last Modified POA    * (Principal) Abscess 3/13/2023 Yes    Perirectal abscess 3/14/2023 Yes    Leukocytosis 3/14/2023 Yes    Diabetes mellitus due to underlying condition, uncontrolled, with hyperglycemia ([address] 3/14/2023 Yes    Poorly controlled diabetes mellitus ([address] 3/15/2023 Yes     Assessment:    Condition: In stable condition. Unchanged. (Uncontrolled type 2 diabetes blood sugars improving  Status post I&D for perirectal abscess  ). Plan:   Discharge home. (Discharge patient home on Lantus 40 units at bedtime plus Humalog 10 units with each meals test blood sugar 4 times a day average blood sugar goal 150 A1c goal of 7 or lower  Advised patient to change diet lower sweet carb intake  Discussed continuous glucose monitoring patient wants to hold off for now  Follow-up in office in 2 weeks reviewed nursing consultants note total time spent 25 minutes).      Electronically signed by North Lui MD on 3/17/23 at 2:27 PM EDT

## 2023-03-19 LAB
BACTERIA BLD CULT ORG #2: NORMAL
BACTERIA BLD CULT: NORMAL
CULTURE SURGICAL: ABNORMAL
CULTURE SURGICAL: ABNORMAL
ORGANISM: ABNORMAL

## 2023-03-24 ENCOUNTER — OFFICE VISIT (OUTPATIENT)
Dept: SURGERY | Age: 53
End: 2023-03-24

## 2023-03-24 VITALS
HEART RATE: 71 BPM | OXYGEN SATURATION: 98 % | BODY MASS INDEX: 33.46 KG/M2 | TEMPERATURE: 97.1 F | WEIGHT: 196 LBS | HEIGHT: 64 IN

## 2023-03-24 DIAGNOSIS — K61.1 PERIRECTAL ABSCESS: Primary | ICD-10-CM

## 2023-03-24 PROCEDURE — 99024 POSTOP FOLLOW-UP VISIT: CPT | Performed by: COLON & RECTAL SURGERY

## 2023-03-24 NOTE — PROGRESS NOTES
Subjective:      Patient ID: Farzana Osullivan is a 50 y.o. female who presents for:  Chief Complaint   Patient presents with    Post-Op Check       She returns to the office following an incision and drainage of a perirectal abscess about 9 days ago. She has home health working with her. She is doing well without significant pain. I reviewed operative report. Past Medical History:   Diagnosis Date    Bilateral chronic otitis media 2015    DMII (diabetes mellitus, type 2) ([address]     Fibroid, uterus     History of medication noncompliance     HTN (hypertension) 2009    Obesity (BMI 30-39. 9)      Past Surgical History:   Procedure Laterality Date    HYSTERECTOMY (CERVIX STATUS UNKNOWN)  2009    fibroids, Dr Mario Napier, ovaries intact    MYRINGOTOMY AND TYMPANOSTOMY TUBE PLACEMENT Bilateral 2015    Dr. 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: unemployed     Occupation: [employer]      Comment: [employer] full time agency   Tobacco Use    Smoking status: Former     Packs/day: 1.00     Years: 15.00     Pack years: 15.00     Types: Cigarettes     Quit date: 2005     Years since quittin.1    Smokeless tobacco: Current     Types: Chew   Vaping Use    Vaping Use: Never used   Substance and Sexual Activity    Alcohol use:  Yes     Alcohol/week: 0.0 standard drinks     Comment: occasional wine consumption not to excess    Drug use: No    Sexual activity: Yes     Partners: Male   Other Topics Concern    Not on file   Social History Narrative    Not on file     Social Determinants of Health     Financial Resource Strain: Not on file   Food

## 2023-03-27 ENCOUNTER — OFFICE VISIT (OUTPATIENT)
Dept: ENDOCRINOLOGY | Age: 53
End: 2023-03-27

## 2023-03-27 VITALS
DIASTOLIC BLOOD PRESSURE: 80 MMHG | BODY MASS INDEX: 33.46 KG/M2 | SYSTOLIC BLOOD PRESSURE: 131 MMHG | WEIGHT: 196 LBS | OXYGEN SATURATION: 98 % | HEIGHT: 64 IN | HEART RATE: 91 BPM

## 2023-03-27 DIAGNOSIS — E11.65 INADEQUATELY CONTROLLED DIABETES MELLITUS (HCC): Primary | ICD-10-CM

## 2023-03-27 LAB
CHP ED QC CHECK: NORMAL
GLUCOSE BLD-MCNC: 149 MG/DL

## 2023-03-27 RX ORDER — INSULIN GLARGINE 100 [IU]/ML
INJECTION, SOLUTION SUBCUTANEOUS
Qty: 10 ADJUSTABLE DOSE PRE-FILLED PEN SYRINGE | Refills: 3
Start: 2023-03-27

## 2023-03-27 NOTE — PROGRESS NOTES
Male   Other Topics Concern    Not on file   Social History Narrative    Not on file     Social Determinants of Health     Financial Resource Strain: Not on file   Food Insecurity: Not on file   Transportation Needs: Not on file   Physical Activity: Not on file   Stress: Not on file   Social Connections: Not on file   Intimate Partner Violence: Not on file   Housing Stability: Not on file     Family History   Problem Relation Age of Onset    Diabetes Mother      Allergies   Allergen Reactions    Hydralazine Nausea And Vomiting     tachycardia    Influenza Vaccines        Current Outpatient Medications:     amLODIPine (NORVASC) 5 MG tablet, Take 1 tablet by mouth daily, Disp: 30 tablet, Rfl: 3    amoxicillin-clavulanate (AUGMENTIN) 875-125 MG per tablet, Take 1 tablet by mouth 2 times daily for 10 days, Disp: 20 tablet, Rfl: 0    insulin glargine (LANTUS SOLOSTAR) 100 UNIT/ML injection pen, 40 units at bedtime, Disp: 10 Adjustable Dose Pre-filled Pen Syringe, Rfl: 3    insulin lispro, 1 Unit Dial, (HUMALOG KWIKPEN) 100 UNIT/ML SOPN, 10 units with each meals for glucose more than 120, Disp: 10 Adjustable Dose Pre-filled Pen Syringe, Rfl: 3    Insulin Pen Needle 32G X 4 MM MISC, 1 each by Does not apply route 4 times daily, Disp: 200 each, Rfl: 3    valsartan (DIOVAN) 320 MG tablet, Take 320 mg by mouth daily, Disp: , Rfl:     metoprolol tartrate (LOPRESSOR) 50 MG tablet, Take 50 mg by mouth in the morning and at bedtime, Disp: , Rfl:     FREESTYLE LITE strip, TEST TWICE DAILY, Disp: 100 strip, Rfl: 0    Blood Glucose Monitoring Suppl (FREESTYLE LITE) YULIYA, 1 Device by Does not apply route daily as needed, Disp: 1 Device, Rfl: 0    FREESTYLE LANCETS MISC, 1 each by Does not apply route daily, Disp: 100 each, Rfl: 3  Lab Results   Component Value Date     03/17/2023    K 3.3 (L) 03/17/2023     03/17/2023    CO2 24 03/17/2023    BUN 4 (L) 03/17/2023    CREATININE 0.75 03/17/2023    GLUCOSE 149 03/27/2023

## 2023-04-07 ENCOUNTER — OFFICE VISIT (OUTPATIENT)
Dept: SURGERY | Age: 53
End: 2023-04-07

## 2023-04-07 VITALS
TEMPERATURE: 97.3 F | HEIGHT: 64 IN | WEIGHT: 196 LBS | HEART RATE: 89 BPM | BODY MASS INDEX: 33.46 KG/M2 | OXYGEN SATURATION: 99 %

## 2023-04-07 DIAGNOSIS — K61.1 PERIRECTAL ABSCESS: Primary | ICD-10-CM

## 2023-04-07 PROCEDURE — 99024 POSTOP FOLLOW-UP VISIT: CPT | Performed by: COLON & RECTAL SURGERY

## 2023-04-07 NOTE — PROGRESS NOTES
Subjective:      Patient ID: Nolvia Jean-Baptiste is a 50 y.o. female who presents for:  Chief Complaint   Patient presents with    Wound Check       She returns to the office after incision and drainage of a right perirectal abscess. She is doing well without problems or concerns. Wound care is ongoing without difficulty or problems. Past Medical History:   Diagnosis Date    Bilateral chronic otitis media 2015    DMII (diabetes mellitus, type 2) ([address]     Fibroid, uterus     History of medication noncompliance     HTN (hypertension)     Obesity (BMI 30-39. 9)      Past Surgical History:   Procedure Laterality Date    HYSTERECTOMY (CERVIX STATUS UNKNOWN)  2009    fibroids, Dr Mari Myrick, ovaries intact    MYRINGOTOMY AND TYMPANOSTOMY TUBE PLACEMENT Bilateral 2015    Dr. 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: unemployed     Occupation: [employer]      Comment: [employer] full time agency   Tobacco Use    Smoking status: Former     Packs/day: 1.00     Years: 15.00     Pack years: 15.00     Types: Cigarettes     Quit date: 2005     Years since quittin.1    Smokeless tobacco: Current     Types: Chew   Vaping Use    Vaping Use: Never used   Substance and Sexual Activity    Alcohol use:  Yes     Alcohol/week: 0.0 standard drinks     Comment: occasional wine consumption not to excess    Drug use: No    Sexual activity: Yes     Partners: Male   Other Topics Concern    Not on file   Social History Narrative    Not on file     Social Determinants of Health     Financial Resource Strain: Not on file   Food Insecurity: Not on file

## 2023-05-15 DIAGNOSIS — E11.65 INADEQUATELY CONTROLLED DIABETES MELLITUS (HCC): ICD-10-CM

## 2023-05-15 LAB
ANION GAP SERPL CALCULATED.3IONS-SCNC: 12 MEQ/L (ref 9–15)
BUN SERPL-MCNC: 15 MG/DL (ref 6–20)
CALCIUM SERPL-MCNC: 9.5 MG/DL (ref 8.5–9.9)
CHLORIDE SERPL-SCNC: 103 MEQ/L (ref 95–107)
CO2 SERPL-SCNC: 23 MEQ/L (ref 20–31)
CREAT SERPL-MCNC: 0.66 MG/DL (ref 0.5–0.9)
CREAT UR-MCNC: 107.7 MG/DL
GLUCOSE SERPL-MCNC: 114 MG/DL (ref 70–99)
HBA1C MFR BLD: 8.6 % (ref 4.8–5.9)
MICROALBUMIN UR-MCNC: <1.2 MG/DL
MICROALBUMIN/CREAT UR-RTO: NORMAL MG/G (ref 0–30)
POTASSIUM SERPL-SCNC: 3.9 MEQ/L (ref 3.4–4.9)
SODIUM SERPL-SCNC: 138 MEQ/L (ref 135–144)

## 2023-05-22 ENCOUNTER — OFFICE VISIT (OUTPATIENT)
Dept: ENDOCRINOLOGY | Age: 53
End: 2023-05-22
Payer: COMMERCIAL

## 2023-05-22 VITALS
OXYGEN SATURATION: 98 % | HEIGHT: 64 IN | BODY MASS INDEX: 36.37 KG/M2 | SYSTOLIC BLOOD PRESSURE: 132 MMHG | WEIGHT: 213 LBS | HEART RATE: 95 BPM | DIASTOLIC BLOOD PRESSURE: 79 MMHG

## 2023-05-22 DIAGNOSIS — E11.65 INADEQUATELY CONTROLLED DIABETES MELLITUS (HCC): Primary | ICD-10-CM

## 2023-05-22 PROCEDURE — G8427 DOCREV CUR MEDS BY ELIG CLIN: HCPCS | Performed by: INTERNAL MEDICINE

## 2023-05-22 PROCEDURE — 4004F PT TOBACCO SCREEN RCVD TLK: CPT | Performed by: INTERNAL MEDICINE

## 2023-05-22 PROCEDURE — 2022F DILAT RTA XM EVC RTNOPTHY: CPT | Performed by: INTERNAL MEDICINE

## 2023-05-22 PROCEDURE — 3075F SYST BP GE 130 - 139MM HG: CPT | Performed by: INTERNAL MEDICINE

## 2023-05-22 PROCEDURE — G8417 CALC BMI ABV UP PARAM F/U: HCPCS | Performed by: INTERNAL MEDICINE

## 2023-05-22 PROCEDURE — 3078F DIAST BP <80 MM HG: CPT | Performed by: INTERNAL MEDICINE

## 2023-05-22 PROCEDURE — 3017F COLORECTAL CA SCREEN DOC REV: CPT | Performed by: INTERNAL MEDICINE

## 2023-05-22 PROCEDURE — 3052F HG A1C>EQUAL 8.0%<EQUAL 9.0%: CPT | Performed by: INTERNAL MEDICINE

## 2023-05-22 PROCEDURE — 99213 OFFICE O/P EST LOW 20 MIN: CPT | Performed by: INTERNAL MEDICINE

## 2023-05-22 RX ORDER — INSULIN GLARGINE 100 [IU]/ML
INJECTION, SOLUTION SUBCUTANEOUS
Qty: 10 ADJUSTABLE DOSE PRE-FILLED PEN SYRINGE | Refills: 3 | Status: SHIPPED | OUTPATIENT
Start: 2023-05-22

## 2023-05-22 RX ORDER — CALCIUM CITRATE/VITAMIN D3 200MG-6.25
1 TABLET ORAL 3 TIMES DAILY
COMMUNITY

## 2023-05-22 RX ORDER — INSULIN LISPRO 100 [IU]/ML
INJECTION, SOLUTION INTRAVENOUS; SUBCUTANEOUS
Qty: 10 ADJUSTABLE DOSE PRE-FILLED PEN SYRINGE | Refills: 3 | Status: SHIPPED | OUTPATIENT
Start: 2023-05-22

## 2023-05-22 ASSESSMENT — ENCOUNTER SYMPTOMS: EYES NEGATIVE: 1

## 2023-05-22 NOTE — PROGRESS NOTES
5/22/2023    Assessment:       Diagnosis Orders   1. Inadequately controlled diabetes mellitus ([facility]  Basic Metabolic Panel    Hemoglobin A1C    Lipid Panel            PLAN:     Continue current dose of Lantus 35 at night Humalog 10 with each meals advised to lose weight A1c target of 7 or lower        Orders Placed This Encounter   Procedures    Basic Metabolic Panel     Standing Status:   Future     Standing Expiration Date:   5/22/2024    Hemoglobin A1C     Standing Status:   Future     Standing Expiration Date:   5/22/2024    Lipid Panel     Standing Status:   Future     Standing Expiration Date:   5/22/2024     No orders of the defined types were placed in this encounter. No follow-ups on file. Subjective:     Chief Complaint   Patient presents with    Diabetes     Vitals:    05/22/23 1601   BP: 132/79   Pulse: 95   SpO2: 98%   Weight: 213 lb (96.6 kg)   Height: 5' 4\" (1.626 m)     Wt Readings from Last 3 Encounters:   05/22/23 213 lb (96.6 kg)   04/07/23 196 lb (88.9 kg)   03/27/23 196 lb (88.9 kg)     BP Readings from Last 3 Encounters:   05/22/23 132/79   03/27/23 131/80   03/17/23 (!) 148/81     Follow-up on type 2 diabetes patient's A1c has improved from 14.8-8.6 has gained some weight as she was losing weight before blood sugars averaging around 200 range on Lantus plus Humalog    Diabetes  She presents for her follow-up diabetic visit. She has type 2 diabetes mellitus. Pertinent negatives for diabetes include no polydipsia and no polyuria. Symptoms are improving. Risk factors for coronary artery disease include obesity. Current diabetic treatment includes insulin injections. She is currently taking insulin pre-breakfast, pre-lunch, pre-dinner and at bedtime. Her overall blood glucose range is 180-200 mg/dl.  (Hemoglobin A1C       Date                     Value               Ref Range           Status                05/15/2023               8.6 (H)             4.8 - 5.9 %         Final

## 2023-08-24 DIAGNOSIS — E11.65 INADEQUATELY CONTROLLED DIABETES MELLITUS (HCC): ICD-10-CM

## 2023-08-24 LAB
ANION GAP SERPL CALCULATED.3IONS-SCNC: 17 MEQ/L (ref 9–15)
BUN SERPL-MCNC: 16 MG/DL (ref 6–20)
CALCIUM SERPL-MCNC: 9.7 MG/DL (ref 8.5–9.9)
CHLORIDE SERPL-SCNC: 103 MEQ/L (ref 95–107)
CHOLEST SERPL-MCNC: 213 MG/DL (ref 0–199)
CO2 SERPL-SCNC: 20 MEQ/L (ref 20–31)
CREAT SERPL-MCNC: 0.8 MG/DL (ref 0.5–0.9)
GLUCOSE SERPL-MCNC: 146 MG/DL (ref 70–99)
HBA1C MFR BLD: 8.8 % (ref 4.8–5.9)
HDLC SERPL-MCNC: 59 MG/DL (ref 40–59)
LDLC SERPL CALC-MCNC: 108 MG/DL (ref 0–129)
POTASSIUM SERPL-SCNC: 3.9 MEQ/L (ref 3.4–4.9)
SODIUM SERPL-SCNC: 140 MEQ/L (ref 135–144)
TRIGL SERPL-MCNC: 231 MG/DL (ref 0–150)

## 2023-08-25 ENCOUNTER — OFFICE VISIT (OUTPATIENT)
Dept: ENDOCRINOLOGY | Age: 53
End: 2023-08-25
Payer: COMMERCIAL

## 2023-08-25 DIAGNOSIS — E11.9 TYPE 2 DIABETES MELLITUS WITHOUT COMPLICATION, UNSPECIFIED WHETHER LONG TERM INSULIN USE (HCC): Primary | ICD-10-CM

## 2023-08-25 PROCEDURE — 4004F PT TOBACCO SCREEN RCVD TLK: CPT | Performed by: INTERNAL MEDICINE

## 2023-08-25 PROCEDURE — 3052F HG A1C>EQUAL 8.0%<EQUAL 9.0%: CPT | Performed by: INTERNAL MEDICINE

## 2023-08-25 PROCEDURE — G8417 CALC BMI ABV UP PARAM F/U: HCPCS | Performed by: INTERNAL MEDICINE

## 2023-08-25 PROCEDURE — G8427 DOCREV CUR MEDS BY ELIG CLIN: HCPCS | Performed by: INTERNAL MEDICINE

## 2023-08-25 PROCEDURE — 3017F COLORECTAL CA SCREEN DOC REV: CPT | Performed by: INTERNAL MEDICINE

## 2023-08-25 PROCEDURE — 99213 OFFICE O/P EST LOW 20 MIN: CPT | Performed by: INTERNAL MEDICINE

## 2023-08-25 PROCEDURE — 2022F DILAT RTA XM EVC RTNOPTHY: CPT | Performed by: INTERNAL MEDICINE

## 2023-08-25 RX ORDER — FLUTICASONE PROPIONATE 50 MCG
SPRAY, SUSPENSION (ML) NASAL
COMMUNITY
Start: 2023-08-20

## 2023-08-25 RX ORDER — FEXOFENADINE HCL 180 MG/1
180 TABLET ORAL DAILY
Qty: 30 TABLET | Refills: 5 | COMMUNITY
Start: 2023-06-20 | End: 2023-12-17

## 2023-08-25 NOTE — PROGRESS NOTES
8/25/2023    Assessment:       Diagnosis Orders   1. Type 2 diabetes mellitus without complication, unspecified whether long term insulin use (HCC)              PLAN:     Orders Placed This Encounter   Procedures    Hemoglobin A1C     Standing Status:   Future     Standing Expiration Date:   1/25/8029    Basic Metabolic Panel     Standing Status:   Future     Standing Expiration Date:   8/25/2024    Lipid Panel     Standing Status:   Future     Standing Expiration Date:   8/25/2024    Lipid Panel     Standing Status:   Future     Standing Expiration Date:   8/25/2024    Hepatic Function Panel     Standing Status:   Future     Standing Expiration Date:   8/25/2024     Continue current dose of Lantus 35 at night Humalog 10 units with each meals hemoglobin A1c goal of 7        Subjective:     Chief Complaint   Patient presents with    Diabetes     Vitals: Wt Readings from Last 3 Encounters:   05/22/23 213 lb (96.6 kg)   04/07/23 196 lb (88.9 kg)   03/27/23 196 lb (88.9 kg)     BP Readings from Last 3 Encounters:   05/22/23 132/79   03/27/23 131/80   03/17/23 (!) 148/81     Pulmonary diabetes patient on Lantus plus Humalog hemoglobin A1c was 8.8 average blood sugars close to 200 on 4 insulin directions daily    Diabetes  She presents for her follow-up diabetic visit. She has type 2 diabetes mellitus. Her disease course has been fluctuating. Pertinent negatives for diabetes include no polydipsia and no polyuria. Symptoms are worsening. Risk factors for coronary artery disease include obesity. Current diabetic treatment includes insulin injections. She is currently taking insulin pre-breakfast, pre-lunch, pre-dinner and at bedtime. Her overall blood glucose range is >200 mg/dl. An ACE inhibitor/angiotensin II receptor blocker is being taken.    Past Medical History:   Diagnosis Date    Bilateral chronic otitis media August 2015    DMII (diabetes mellitus, type 2) ([address]) 2008    Fibroid, uterus     History of medication

## 2024-01-14 ENCOUNTER — APPOINTMENT (OUTPATIENT)
Dept: GENERAL RADIOLOGY | Age: 54
DRG: 310 | End: 2024-01-14
Payer: COMMERCIAL

## 2024-01-14 ENCOUNTER — HOSPITAL ENCOUNTER (INPATIENT)
Age: 54
LOS: 1 days | Discharge: LEFT BEFORE DISCHARGE | DRG: 310 | End: 2024-01-15
Attending: INTERNAL MEDICINE | Admitting: INTERNAL MEDICINE
Payer: COMMERCIAL

## 2024-01-14 ENCOUNTER — APPOINTMENT (OUTPATIENT)
Dept: CT IMAGING | Age: 54
DRG: 310 | End: 2024-01-14
Payer: COMMERCIAL

## 2024-01-14 DIAGNOSIS — I48.91 ATRIAL FIBRILLATION WITH RVR (HCC): ICD-10-CM

## 2024-01-14 DIAGNOSIS — R73.9 HYPERGLYCEMIA: ICD-10-CM

## 2024-01-14 DIAGNOSIS — I48.91 NEW ONSET A-FIB (HCC): Primary | ICD-10-CM

## 2024-01-14 LAB
ALBUMIN SERPL-MCNC: 4.4 G/DL (ref 3.5–4.6)
ALP SERPL-CCNC: 115 U/L (ref 40–130)
ALT SERPL-CCNC: 33 U/L (ref 0–33)
AMPHET UR QL SCN: NORMAL
ANION GAP SERPL CALCULATED.3IONS-SCNC: 22 MEQ/L (ref 9–15)
ANISOCYTOSIS BLD QL SMEAR: ABNORMAL
APTT PPP: 22.1 SEC (ref 24.4–36.8)
AST SERPL-CCNC: 29 U/L (ref 0–35)
B-OH-BUTYR SERPL-SCNC: 2.8 MG/DL (ref 0.2–2.8)
BARBITURATES UR QL SCN: NORMAL
BASE EXCESS VENOUS: -5 (ref -3–3)
BASOPHILS # BLD: 0.1 K/UL (ref 0–0.2)
BASOPHILS NFR BLD: 1 %
BENZODIAZ UR QL SCN: NORMAL
BILIRUB SERPL-MCNC: 0.5 MG/DL (ref 0.2–0.7)
BILIRUB UR QL STRIP: NEGATIVE
BNP BLD-MCNC: 101 PG/ML
BUN SERPL-MCNC: 11 MG/DL (ref 6–20)
CALCIUM IONIZED: 1.16 MMOL/L (ref 1.12–1.32)
CALCIUM SERPL-MCNC: 9.5 MG/DL (ref 8.5–9.9)
CANNABINOIDS UR QL SCN: NORMAL
CHLORIDE SERPL-SCNC: 100 MEQ/L (ref 95–107)
CLARITY UR: CLEAR
CO2 SERPL-SCNC: 16 MEQ/L (ref 20–31)
COCAINE UR QL SCN: NORMAL
COLOR UR: YELLOW
CREAT SERPL-MCNC: 0.85 MG/DL (ref 0.5–0.9)
DRUG SCREEN COMMENT UR-IMP: NORMAL
EOSINOPHIL # BLD: 0.3 K/UL (ref 0–0.7)
EOSINOPHIL NFR BLD: 3 %
ERYTHROCYTE [DISTWIDTH] IN BLOOD BY AUTOMATED COUNT: 16.6 % (ref 11.5–14.5)
ETHANOL PERCENT: NORMAL G/DL
ETHANOLAMINE SERPL-MCNC: <10 MG/DL (ref 0–0.08)
FENTANYL SCREEN, URINE: NORMAL
GLOBULIN SER CALC-MCNC: 3.8 G/DL (ref 2.3–3.5)
GLUCOSE BLD-MCNC: 206 MG/DL (ref 70–99)
GLUCOSE BLD-MCNC: 246 MG/DL (ref 70–99)
GLUCOSE BLD-MCNC: 255 MG/DL (ref 70–99)
GLUCOSE BLD-MCNC: 331 MG/DL (ref 70–99)
GLUCOSE BLD-MCNC: 332 MG/DL (ref 70–99)
GLUCOSE BLD-MCNC: 372 MG/DL (ref 70–99)
GLUCOSE SERPL-MCNC: 311 MG/DL (ref 70–99)
GLUCOSE UR STRIP-MCNC: >=1000 MG/DL
HCO3 VENOUS: 19.7 MMOL/L (ref 23–29)
HCT VFR BLD AUTO: 47 % (ref 37–47)
HCT VFR BLD AUTO: 51 % (ref 36–48)
HGB BLD CALC-MCNC: 17.4 GM/DL (ref 12–16)
HGB BLD-MCNC: 15.1 G/DL (ref 12–16)
HGB UR QL STRIP: NEGATIVE
INFLUENZA A BY PCR: NEGATIVE
INFLUENZA B BY PCR: NEGATIVE
INR PPP: 0.9
KETONES UR STRIP-MCNC: 15 MG/DL
LACTATE BLDV-SCNC: 3.2 MMOL/L (ref 0.5–2.2)
LACTATE BLDV-SCNC: 3.4 MMOL/L (ref 0.5–2.2)
LACTATE BLDV-SCNC: 3.5 MMOL/L (ref 0.5–2.2)
LACTATE BLDV-SCNC: 4.4 MMOL/L (ref 0.5–2.2)
LACTATE BLDV-SCNC: 4.8 MMOL/L (ref 0.5–2.2)
LACTATE: 4.81 MMOL/L (ref 0.4–2)
LEUKOCYTE ESTERASE UR QL STRIP: NEGATIVE
LIPASE SERPL-CCNC: 35 U/L (ref 12–95)
LYMPHOCYTES # BLD: 5.5 K/UL (ref 1–4.8)
LYMPHOCYTES NFR BLD: 50 %
MAGNESIUM SERPL-MCNC: 1.7 MG/DL (ref 1.7–2.4)
MCH RBC QN AUTO: 23.7 PG (ref 27–31.3)
MCHC RBC AUTO-ENTMCNC: 32.1 % (ref 33–37)
MCV RBC AUTO: 73.8 FL (ref 79.4–94.8)
METHADONE UR QL SCN: NORMAL
MICROCYTES BLD QL SMEAR: ABNORMAL
MONOCYTES # BLD: 0.6 K/UL (ref 0.2–0.8)
MONOCYTES NFR BLD: 4.8 %
NEUTROPHILS # BLD: 4.6 K/UL (ref 1.4–6.5)
NEUTS SEG NFR BLD: 42 %
NITRITE UR QL STRIP: NEGATIVE
O2 SAT, VEN: 91 %
OPIATES UR QL SCN: NORMAL
OXYCODONE UR QL SCN: NORMAL
PCO2, VEN: 32.7 MM HG (ref 40–50)
PCP UR QL SCN: NORMAL
PERFORMED ON: ABNORMAL
PH UR STRIP: 6 [PH] (ref 5–9)
PH VENOUS: 7.39 (ref 7.32–7.42)
PLATELET # BLD AUTO: 255 K/UL (ref 130–400)
PLATELET BLD QL SMEAR: ADEQUATE
PO2, VEN: 60 MM HG
POC CHLORIDE: 106 MEQ/L (ref 99–110)
POC CREATININE WHOLE BLOOD: 0.8
POC CREATININE: 0.5 MG/DL (ref 0.6–1.2)
POC SAMPLE TYPE: ABNORMAL
POTASSIUM SERPL-SCNC: 3.6 MEQ/L (ref 3.4–4.9)
POTASSIUM SERPL-SCNC: 4 MEQ/L (ref 3.5–5.1)
PROCALCITONIN SERPL IA-MCNC: 0.03 NG/ML (ref 0–0.15)
PROPOXYPH UR QL SCN: NORMAL
PROT SERPL-MCNC: 8.2 G/DL (ref 6.3–8)
PROT UR STRIP-MCNC: NEGATIVE MG/DL
PROTHROMBIN TIME: 12.2 SEC (ref 12.3–14.9)
RBC # BLD AUTO: 6.37 M/UL (ref 4.2–5.4)
REASON FOR REJECTION: NORMAL
REJECTED TEST: NORMAL
SARS-COV-2 RDRP RESP QL NAA+PROBE: NOT DETECTED
SODIUM BLD-SCNC: 140 MEQ/L (ref 136–145)
SODIUM SERPL-SCNC: 138 MEQ/L (ref 135–144)
SP GR UR STRIP: 1.02 (ref 1–1.03)
TCO2 CALC VENOUS: 21 MMOL/L
TROPONIN, HIGH SENSITIVITY: 10 NG/L (ref 0–19)
TROPONIN, HIGH SENSITIVITY: 9 NG/L (ref 0–19)
TROPONIN, HIGH SENSITIVITY: NORMAL NG/L (ref 0–19)
TSH SERPL-MCNC: 1.77 UIU/ML (ref 0.44–3.86)
URINE REFLEX TO CULTURE: ABNORMAL
UROBILINOGEN UR STRIP-ACNC: 0.2 E.U./DL
WBC # BLD AUTO: 11 K/UL (ref 4.8–10.8)

## 2024-01-14 PROCEDURE — 83605 ASSAY OF LACTIC ACID: CPT

## 2024-01-14 PROCEDURE — 82010 KETONE BODYS QUAN: CPT

## 2024-01-14 PROCEDURE — 2060000000 HC ICU INTERMEDIATE R&B

## 2024-01-14 PROCEDURE — 36415 COLL VENOUS BLD VENIPUNCTURE: CPT

## 2024-01-14 PROCEDURE — 71045 X-RAY EXAM CHEST 1 VIEW: CPT

## 2024-01-14 PROCEDURE — 85025 COMPLETE CBC W/AUTO DIFF WBC: CPT

## 2024-01-14 PROCEDURE — 81003 URINALYSIS AUTO W/O SCOPE: CPT

## 2024-01-14 PROCEDURE — 2500000003 HC RX 250 WO HCPCS: Performed by: STUDENT IN AN ORGANIZED HEALTH CARE EDUCATION/TRAINING PROGRAM

## 2024-01-14 PROCEDURE — 99285 EMERGENCY DEPT VISIT HI MDM: CPT

## 2024-01-14 PROCEDURE — 84132 ASSAY OF SERUM POTASSIUM: CPT

## 2024-01-14 PROCEDURE — 6360000002 HC RX W HCPCS: Performed by: INTERNAL MEDICINE

## 2024-01-14 PROCEDURE — 84443 ASSAY THYROID STIM HORMONE: CPT

## 2024-01-14 PROCEDURE — 96376 TX/PRO/DX INJ SAME DRUG ADON: CPT

## 2024-01-14 PROCEDURE — 80053 COMPREHEN METABOLIC PANEL: CPT

## 2024-01-14 PROCEDURE — 82435 ASSAY OF BLOOD CHLORIDE: CPT

## 2024-01-14 PROCEDURE — 83690 ASSAY OF LIPASE: CPT

## 2024-01-14 PROCEDURE — 6370000000 HC RX 637 (ALT 250 FOR IP): Performed by: INTERNAL MEDICINE

## 2024-01-14 PROCEDURE — 2580000003 HC RX 258: Performed by: INTERNAL MEDICINE

## 2024-01-14 PROCEDURE — 96375 TX/PRO/DX INJ NEW DRUG ADDON: CPT

## 2024-01-14 PROCEDURE — 85014 HEMATOCRIT: CPT

## 2024-01-14 PROCEDURE — 84295 ASSAY OF SERUM SODIUM: CPT

## 2024-01-14 PROCEDURE — 2580000003 HC RX 258: Performed by: STUDENT IN AN ORGANIZED HEALTH CARE EDUCATION/TRAINING PROGRAM

## 2024-01-14 PROCEDURE — 36600 WITHDRAWAL OF ARTERIAL BLOOD: CPT

## 2024-01-14 PROCEDURE — 83880 ASSAY OF NATRIURETIC PEPTIDE: CPT

## 2024-01-14 PROCEDURE — 82948 REAGENT STRIP/BLOOD GLUCOSE: CPT

## 2024-01-14 PROCEDURE — 80307 DRUG TEST PRSMV CHEM ANLYZR: CPT

## 2024-01-14 PROCEDURE — 82565 ASSAY OF CREATININE: CPT

## 2024-01-14 PROCEDURE — 85730 THROMBOPLASTIN TIME PARTIAL: CPT

## 2024-01-14 PROCEDURE — 6360000004 HC RX CONTRAST MEDICATION: Performed by: STUDENT IN AN ORGANIZED HEALTH CARE EDUCATION/TRAINING PROGRAM

## 2024-01-14 PROCEDURE — 84145 PROCALCITONIN (PCT): CPT

## 2024-01-14 PROCEDURE — 85610 PROTHROMBIN TIME: CPT

## 2024-01-14 PROCEDURE — 82803 BLOOD GASES ANY COMBINATION: CPT

## 2024-01-14 PROCEDURE — 84484 ASSAY OF TROPONIN QUANT: CPT

## 2024-01-14 PROCEDURE — 96374 THER/PROPH/DIAG INJ IV PUSH: CPT

## 2024-01-14 PROCEDURE — 82330 ASSAY OF CALCIUM: CPT

## 2024-01-14 PROCEDURE — 82077 ASSAY SPEC XCP UR&BREATH IA: CPT

## 2024-01-14 PROCEDURE — 83735 ASSAY OF MAGNESIUM: CPT

## 2024-01-14 PROCEDURE — 71275 CT ANGIOGRAPHY CHEST: CPT

## 2024-01-14 PROCEDURE — 6360000002 HC RX W HCPCS: Performed by: STUDENT IN AN ORGANIZED HEALTH CARE EDUCATION/TRAINING PROGRAM

## 2024-01-14 PROCEDURE — 87502 INFLUENZA DNA AMP PROBE: CPT

## 2024-01-14 PROCEDURE — 6370000000 HC RX 637 (ALT 250 FOR IP): Performed by: STUDENT IN AN ORGANIZED HEALTH CARE EDUCATION/TRAINING PROGRAM

## 2024-01-14 PROCEDURE — 87635 SARS-COV-2 COVID-19 AMP PRB: CPT

## 2024-01-14 PROCEDURE — 87040 BLOOD CULTURE FOR BACTERIA: CPT

## 2024-01-14 RX ORDER — DEXTROSE MONOHYDRATE 100 MG/ML
INJECTION, SOLUTION INTRAVENOUS CONTINUOUS PRN
Status: DISCONTINUED | OUTPATIENT
Start: 2024-01-14 | End: 2024-01-15 | Stop reason: HOSPADM

## 2024-01-14 RX ORDER — INSULIN LISPRO 100 [IU]/ML
0-4 INJECTION, SOLUTION INTRAVENOUS; SUBCUTANEOUS NIGHTLY
Status: DISCONTINUED | OUTPATIENT
Start: 2024-01-14 | End: 2024-01-15 | Stop reason: HOSPADM

## 2024-01-14 RX ORDER — LORAZEPAM 2 MG/ML
1 INJECTION INTRAMUSCULAR
Status: DISCONTINUED | OUTPATIENT
Start: 2024-01-14 | End: 2024-01-15 | Stop reason: HOSPADM

## 2024-01-14 RX ORDER — MULTIVITAMIN WITH IRON
1 TABLET ORAL DAILY
Status: DISCONTINUED | OUTPATIENT
Start: 2024-01-14 | End: 2024-01-15 | Stop reason: HOSPADM

## 2024-01-14 RX ORDER — METOPROLOL TARTRATE 1 MG/ML
5 INJECTION, SOLUTION INTRAVENOUS ONCE
Status: COMPLETED | OUTPATIENT
Start: 2024-01-14 | End: 2024-01-14

## 2024-01-14 RX ORDER — SODIUM CHLORIDE 9 MG/ML
INJECTION, SOLUTION INTRAVENOUS PRN
Status: DISCONTINUED | OUTPATIENT
Start: 2024-01-14 | End: 2024-01-15 | Stop reason: HOSPADM

## 2024-01-14 RX ORDER — POTASSIUM CHLORIDE 20 MEQ/1
40 TABLET, EXTENDED RELEASE ORAL PRN
Status: DISCONTINUED | OUTPATIENT
Start: 2024-01-14 | End: 2024-01-15 | Stop reason: HOSPADM

## 2024-01-14 RX ORDER — ONDANSETRON 2 MG/ML
4 INJECTION INTRAMUSCULAR; INTRAVENOUS ONCE
Status: COMPLETED | OUTPATIENT
Start: 2024-01-14 | End: 2024-01-14

## 2024-01-14 RX ORDER — LORAZEPAM 1 MG/1
2 TABLET ORAL
Status: DISCONTINUED | OUTPATIENT
Start: 2024-01-14 | End: 2024-01-15 | Stop reason: HOSPADM

## 2024-01-14 RX ORDER — ACETAMINOPHEN 325 MG/1
650 TABLET ORAL EVERY 6 HOURS PRN
Status: DISCONTINUED | OUTPATIENT
Start: 2024-01-14 | End: 2024-01-15 | Stop reason: HOSPADM

## 2024-01-14 RX ORDER — SODIUM CHLORIDE 0.9 % (FLUSH) 0.9 %
5-40 SYRINGE (ML) INJECTION EVERY 12 HOURS SCHEDULED
Status: DISCONTINUED | OUTPATIENT
Start: 2024-01-14 | End: 2024-01-15 | Stop reason: HOSPADM

## 2024-01-14 RX ORDER — LORAZEPAM 2 MG/ML
0.5 INJECTION INTRAMUSCULAR ONCE
Status: COMPLETED | OUTPATIENT
Start: 2024-01-14 | End: 2024-01-14

## 2024-01-14 RX ORDER — ACETAMINOPHEN 650 MG/1
650 SUPPOSITORY RECTAL EVERY 6 HOURS PRN
Status: DISCONTINUED | OUTPATIENT
Start: 2024-01-14 | End: 2024-01-15 | Stop reason: HOSPADM

## 2024-01-14 RX ORDER — SODIUM CHLORIDE 9 MG/ML
INJECTION, SOLUTION INTRAVENOUS CONTINUOUS
Status: DISCONTINUED | OUTPATIENT
Start: 2024-01-14 | End: 2024-01-15 | Stop reason: HOSPADM

## 2024-01-14 RX ORDER — INSULIN GLARGINE 100 [IU]/ML
20 INJECTION, SOLUTION SUBCUTANEOUS NIGHTLY
Status: DISCONTINUED | OUTPATIENT
Start: 2024-01-14 | End: 2024-01-15 | Stop reason: HOSPADM

## 2024-01-14 RX ORDER — INSULIN LISPRO 100 [IU]/ML
0-4 INJECTION, SOLUTION INTRAVENOUS; SUBCUTANEOUS
Status: DISCONTINUED | OUTPATIENT
Start: 2024-01-14 | End: 2024-01-15 | Stop reason: HOSPADM

## 2024-01-14 RX ORDER — POTASSIUM CHLORIDE 7.45 MG/ML
10 INJECTION INTRAVENOUS PRN
Status: DISCONTINUED | OUTPATIENT
Start: 2024-01-14 | End: 2024-01-15 | Stop reason: HOSPADM

## 2024-01-14 RX ORDER — POLYETHYLENE GLYCOL 3350 17 G/17G
17 POWDER, FOR SOLUTION ORAL DAILY PRN
Status: DISCONTINUED | OUTPATIENT
Start: 2024-01-14 | End: 2024-01-15 | Stop reason: HOSPADM

## 2024-01-14 RX ORDER — THIAMINE HYDROCHLORIDE 100 MG/ML
100 INJECTION, SOLUTION INTRAMUSCULAR; INTRAVENOUS DAILY
Status: DISCONTINUED | OUTPATIENT
Start: 2024-01-14 | End: 2024-01-15 | Stop reason: HOSPADM

## 2024-01-14 RX ORDER — METOPROLOL TARTRATE 50 MG/1
50 TABLET, FILM COATED ORAL 2 TIMES DAILY
Status: DISCONTINUED | OUTPATIENT
Start: 2024-01-14 | End: 2024-01-15 | Stop reason: HOSPADM

## 2024-01-14 RX ORDER — ONDANSETRON 2 MG/ML
4 INJECTION INTRAMUSCULAR; INTRAVENOUS EVERY 6 HOURS PRN
Status: DISCONTINUED | OUTPATIENT
Start: 2024-01-14 | End: 2024-01-15 | Stop reason: HOSPADM

## 2024-01-14 RX ORDER — 0.9 % SODIUM CHLORIDE 0.9 %
1000 INTRAVENOUS SOLUTION INTRAVENOUS ONCE
Status: COMPLETED | OUTPATIENT
Start: 2024-01-14 | End: 2024-01-14

## 2024-01-14 RX ORDER — SODIUM CHLORIDE 0.9 % (FLUSH) 0.9 %
5-40 SYRINGE (ML) INJECTION PRN
Status: DISCONTINUED | OUTPATIENT
Start: 2024-01-14 | End: 2024-01-15 | Stop reason: HOSPADM

## 2024-01-14 RX ORDER — LORAZEPAM 2 MG/ML
0.5 INJECTION INTRAMUSCULAR EVERY 6 HOURS PRN
Status: DISCONTINUED | OUTPATIENT
Start: 2024-01-14 | End: 2024-01-15 | Stop reason: HOSPADM

## 2024-01-14 RX ORDER — VALSARTAN 160 MG/1
320 TABLET ORAL DAILY
Status: DISCONTINUED | OUTPATIENT
Start: 2024-01-14 | End: 2024-01-15 | Stop reason: HOSPADM

## 2024-01-14 RX ORDER — LORAZEPAM 1 MG/1
0.5 TABLET ORAL ONCE
Status: DISCONTINUED | OUTPATIENT
Start: 2024-01-14 | End: 2024-01-14

## 2024-01-14 RX ORDER — MAGNESIUM SULFATE IN WATER 40 MG/ML
2000 INJECTION, SOLUTION INTRAVENOUS PRN
Status: DISCONTINUED | OUTPATIENT
Start: 2024-01-14 | End: 2024-01-15 | Stop reason: HOSPADM

## 2024-01-14 RX ORDER — ENOXAPARIN SODIUM 100 MG/ML
30 INJECTION SUBCUTANEOUS 2 TIMES DAILY
Status: DISCONTINUED | OUTPATIENT
Start: 2024-01-14 | End: 2024-01-15 | Stop reason: HOSPADM

## 2024-01-14 RX ORDER — LORAZEPAM 2 MG/ML
2 INJECTION INTRAMUSCULAR
Status: DISCONTINUED | OUTPATIENT
Start: 2024-01-14 | End: 2024-01-15 | Stop reason: HOSPADM

## 2024-01-14 RX ORDER — ONDANSETRON 4 MG/1
4 TABLET, ORALLY DISINTEGRATING ORAL EVERY 8 HOURS PRN
Status: DISCONTINUED | OUTPATIENT
Start: 2024-01-14 | End: 2024-01-15 | Stop reason: HOSPADM

## 2024-01-14 RX ORDER — LORAZEPAM 2 MG/ML
4 INJECTION INTRAMUSCULAR
Status: DISCONTINUED | OUTPATIENT
Start: 2024-01-14 | End: 2024-01-15 | Stop reason: HOSPADM

## 2024-01-14 RX ORDER — GLUCAGON 1 MG/ML
1 KIT INJECTION PRN
Status: DISCONTINUED | OUTPATIENT
Start: 2024-01-14 | End: 2024-01-15 | Stop reason: HOSPADM

## 2024-01-14 RX ORDER — LORAZEPAM 2 MG/ML
3 INJECTION INTRAMUSCULAR
Status: DISCONTINUED | OUTPATIENT
Start: 2024-01-14 | End: 2024-01-15 | Stop reason: HOSPADM

## 2024-01-14 RX ORDER — 0.9 % SODIUM CHLORIDE 0.9 %
1000 INTRAVENOUS SOLUTION INTRAVENOUS ONCE
Status: COMPLETED | OUTPATIENT
Start: 2024-01-14 | End: 2024-01-15

## 2024-01-14 RX ORDER — LORAZEPAM 1 MG/1
4 TABLET ORAL
Status: DISCONTINUED | OUTPATIENT
Start: 2024-01-14 | End: 2024-01-15 | Stop reason: HOSPADM

## 2024-01-14 RX ORDER — LORAZEPAM 1 MG/1
1 TABLET ORAL
Status: DISCONTINUED | OUTPATIENT
Start: 2024-01-14 | End: 2024-01-15 | Stop reason: HOSPADM

## 2024-01-14 RX ORDER — LORAZEPAM 1 MG/1
3 TABLET ORAL
Status: DISCONTINUED | OUTPATIENT
Start: 2024-01-14 | End: 2024-01-15 | Stop reason: HOSPADM

## 2024-01-14 RX ADMIN — SODIUM CHLORIDE 1000 ML: 9 INJECTION, SOLUTION INTRAVENOUS at 06:27

## 2024-01-14 RX ADMIN — SODIUM CHLORIDE 1000 ML: 9 INJECTION, SOLUTION INTRAVENOUS at 12:17

## 2024-01-14 RX ADMIN — SODIUM CHLORIDE 1000 ML: 9 INJECTION, SOLUTION INTRAVENOUS at 22:29

## 2024-01-14 RX ADMIN — INSULIN HUMAN 10 UNITS: 100 INJECTION, SOLUTION PARENTERAL at 09:11

## 2024-01-14 RX ADMIN — LORAZEPAM 0.5 MG: 2 INJECTION INTRAMUSCULAR; INTRAVENOUS at 09:19

## 2024-01-14 RX ADMIN — METOPROLOL TARTRATE 50 MG: 50 TABLET ORAL at 11:28

## 2024-01-14 RX ADMIN — DILTIAZEM HYDROCHLORIDE 5 MG/HR: 5 INJECTION, SOLUTION INTRAVENOUS at 09:19

## 2024-01-14 RX ADMIN — INSULIN HUMAN 7 UNITS: 100 INJECTION, SOLUTION PARENTERAL at 06:54

## 2024-01-14 RX ADMIN — INSULIN LISPRO 1 UNITS: 100 INJECTION, SOLUTION INTRAVENOUS; SUBCUTANEOUS at 18:42

## 2024-01-14 RX ADMIN — IOPAMIDOL 75 ML: 612 INJECTION, SOLUTION INTRAVENOUS at 07:05

## 2024-01-14 RX ADMIN — METOPROLOL TARTRATE 5 MG: 5 INJECTION INTRAVENOUS at 06:21

## 2024-01-14 RX ADMIN — ONDANSETRON 4 MG: 2 INJECTION INTRAMUSCULAR; INTRAVENOUS at 06:21

## 2024-01-14 RX ADMIN — SODIUM CHLORIDE: 9 INJECTION, SOLUTION INTRAVENOUS at 15:48

## 2024-01-14 RX ADMIN — LORAZEPAM 0.5 MG: 2 INJECTION INTRAMUSCULAR; INTRAVENOUS at 12:17

## 2024-01-14 RX ADMIN — VALSARTAN 320 MG: 160 TABLET ORAL at 12:17

## 2024-01-14 RX ADMIN — INSULIN GLARGINE 20 UNITS: 100 INJECTION, SOLUTION SUBCUTANEOUS at 22:28

## 2024-01-14 RX ADMIN — METOPROLOL TARTRATE 50 MG: 50 TABLET ORAL at 22:28

## 2024-01-14 RX ADMIN — THIAMINE HYDROCHLORIDE 100 MG: 100 INJECTION, SOLUTION INTRAMUSCULAR; INTRAVENOUS at 11:28

## 2024-01-14 RX ADMIN — METOPROLOL TARTRATE 5 MG: 5 INJECTION INTRAVENOUS at 07:54

## 2024-01-14 RX ADMIN — INSULIN LISPRO 1 UNITS: 100 INJECTION, SOLUTION INTRAVENOUS; SUBCUTANEOUS at 11:29

## 2024-01-14 RX ADMIN — METOPROLOL TARTRATE 5 MG: 5 INJECTION, SOLUTION INTRAVENOUS at 07:20

## 2024-01-14 RX ADMIN — THERA TABS 1 TABLET: TAB at 11:28

## 2024-01-14 RX ADMIN — SODIUM CHLORIDE: 9 INJECTION, SOLUTION INTRAVENOUS at 11:26

## 2024-01-14 ASSESSMENT — LIFESTYLE VARIABLES
HOW OFTEN DO YOU HAVE A DRINK CONTAINING ALCOHOL: 2-3 TIMES A WEEK
HOW MANY STANDARD DRINKS CONTAINING ALCOHOL DO YOU HAVE ON A TYPICAL DAY: 1 OR 2

## 2024-01-14 ASSESSMENT — PAIN - FUNCTIONAL ASSESSMENT
PAIN_FUNCTIONAL_ASSESSMENT: 0-10
PAIN_FUNCTIONAL_ASSESSMENT: NONE - DENIES PAIN

## 2024-01-14 NOTE — H&P
History and Physical    Admit Date: 2024  PCP: Kaycee Guidry MD    CHIEF COMPLAINT:    Chief Complaint   Patient presents with    Anxiety    Palpitations    Emesis    Diarrhea        HISTORY OF PRESENT ILLNESS:    The patient is a 53 y.o. female with a past medical history of hypertension, obesity, alcohol use disorder, diabetes who presented to hospital with emesis and diarrhea.  Patient also felt palpitation today.  She was noted to be in A-fib with RVR in the emergency room.  No history of A-fib.  Not on anticoagulation.  She had nonbloody nonbilious emesis.  She had diarrhea.  No abdominal pain.  No fever or chills.  No chest pain or chest heaviness.  No dyspnea.  No cough.      Past Medical History:        Diagnosis Date    Bilateral chronic otitis media 2015    DMII (diabetes mellitus, type 2) (HCC)     Fibroid, uterus     History of medication noncompliance     HTN (hypertension)     Obesity (BMI 30-39.9)        Past Surgical History:        Procedure Laterality Date    HYSTERECTOMY (CERVIX STATUS UNKNOWN)  2009    fibroids, Dr Maurer, ovaries intact    MYRINGOTOMY AND TYMPANOSTOMY TUBE PLACEMENT Bilateral 2015    Dr. 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

## 2024-01-14 NOTE — ED NOTES
Called lab to see why there are 2 troponins resulted at the same time. The 1st draw was hemolyzed so they ran the 2nd as the 1st. They will cancel out one of them and have phleb come and draw the 0830 one along with repeat lactic acid.

## 2024-01-14 NOTE — ED PROVIDER NOTES
[facility] ED  EMERGENCY DEPARTMENT ENCOUNTER      Pt Name: Jaqueline Schmidt  MRN: [id number]  Birthdate 1970  Date of evaluation: 1/14/2024  Provider: Ledy Cummings PA-C  10:21 AM    CHIEF COMPLAINT       Chief Complaint   Patient presents with    Anxiety    Palpitations    Emesis    Diarrhea         HISTORY OF PRESENT ILLNESS    Jaqueline Schmidt is a 53 y.o. female who per chart review has pmhx of perirectal abscess, TIIDM, obesity, HTN, uterine fibroids presents to the emergency department for evaluation of palpitations. Pt states this morning prior to arrival, she woke up to use the restroom. She states she had one episode of non bloody non bilious emesis and became very anxious. She then had one episode of non bloody diarrhea which she states is not uncommon for her to have when she is feeling anxious. She states her heart then started racing. She drove herself here to the ED. It appears pt is in Afib on the monitor on arrival to the ED. She denies hx of same. She states she had 1 beer a \"couple\" shots of liquor last night before bed. She states she drinks this amount of alcohol on most days. Denies drug use. Was feeling fine last night before going to bed, no recent illnesses. She states she is feeling mildly lightheaded and sob as well. She denies fever, chills, chest pain, abd pain, back pain, dizziness, continued nvd urinary sx leg swelling orthopnea diaphoresis headache cough or other URI sx.      HPI    Nursing Notes were reviewed.    REVIEW OF SYSTEMS       Review of Systems   Constitutional:  Negative for chills, fatigue and fever.   HENT:  Negative for congestion.    Eyes:  Negative for photophobia.   Respiratory:  Positive for shortness of breath. Negative for cough and wheezing.    Cardiovascular:  Positive for palpitations. Negative for chest pain and leg swelling.   Gastrointestinal:  Positive for diarrhea, nausea and vomiting. Negative for abdominal distention, abdominal

## 2024-01-14 NOTE — ED TRIAGE NOTES
Patient arrived to ER by private vehicle, drove self to ER.   Patient states woke up to go to the bathroom, vomited, started having anxiety.   Patient states now having heart palpitations.   
(4) no impairment

## 2024-01-15 ENCOUNTER — APPOINTMENT (OUTPATIENT)
Age: 54
DRG: 310 | End: 2024-01-15
Payer: COMMERCIAL

## 2024-01-15 VITALS
BODY MASS INDEX: 38.76 KG/M2 | WEIGHT: 227 LBS | TEMPERATURE: 98.1 F | DIASTOLIC BLOOD PRESSURE: 92 MMHG | HEART RATE: 82 BPM | HEIGHT: 64 IN | SYSTOLIC BLOOD PRESSURE: 157 MMHG | RESPIRATION RATE: 18 BRPM | OXYGEN SATURATION: 100 %

## 2024-01-15 LAB
ALBUMIN SERPL-MCNC: 3.5 G/DL (ref 3.5–4.6)
ALP SERPL-CCNC: 80 U/L (ref 40–130)
ALT SERPL-CCNC: 20 U/L (ref 0–33)
ANION GAP SERPL CALCULATED.3IONS-SCNC: 14 MEQ/L (ref 9–15)
AST SERPL-CCNC: 19 U/L (ref 0–35)
BASOPHILS # BLD: 0.1 K/UL (ref 0–0.2)
BASOPHILS NFR BLD: 1 %
BILIRUB SERPL-MCNC: 0.8 MG/DL (ref 0.2–0.7)
BUN SERPL-MCNC: 8 MG/DL (ref 6–20)
CALCIUM SERPL-MCNC: 8.2 MG/DL (ref 8.5–9.9)
CHLORIDE SERPL-SCNC: 102 MEQ/L (ref 95–107)
CO2 SERPL-SCNC: 22 MEQ/L (ref 20–31)
CREAT SERPL-MCNC: 0.69 MG/DL (ref 0.5–0.9)
EOSINOPHIL # BLD: 0.1 K/UL (ref 0–0.7)
EOSINOPHIL NFR BLD: 1.8 %
ERYTHROCYTE [DISTWIDTH] IN BLOOD BY AUTOMATED COUNT: 14.4 % (ref 11.5–14.5)
GLOBULIN SER CALC-MCNC: 2.9 G/DL (ref 2.3–3.5)
GLUCOSE BLD-MCNC: 228 MG/DL (ref 70–99)
GLUCOSE BLD-MCNC: 230 MG/DL (ref 70–99)
GLUCOSE BLD-MCNC: 305 MG/DL (ref 70–99)
GLUCOSE SERPL-MCNC: 219 MG/DL (ref 70–99)
HCT VFR BLD AUTO: 40.1 % (ref 37–47)
HGB BLD-MCNC: 12.6 G/DL (ref 12–16)
LYMPHOCYTES # BLD: 3.7 K/UL (ref 1–4.8)
LYMPHOCYTES NFR BLD: 47.8 %
MCH RBC QN AUTO: 23.2 PG (ref 27–31.3)
MCHC RBC AUTO-ENTMCNC: 31.4 % (ref 33–37)
MCV RBC AUTO: 73.7 FL (ref 79.4–94.8)
MONOCYTES # BLD: 0.5 K/UL (ref 0.2–0.8)
MONOCYTES NFR BLD: 6.1 %
NEUTROPHILS # BLD: 3.4 K/UL (ref 1.4–6.5)
NEUTS SEG NFR BLD: 43 %
PERFORMED ON: ABNORMAL
PERFORMED ON: NORMAL
PLATELET # BLD AUTO: 252 K/UL (ref 130–400)
POC CREATININE: 0.8 MG/DL (ref 0.6–1.2)
POC SAMPLE TYPE: NORMAL
POTASSIUM SERPL-SCNC: 3.6 MEQ/L (ref 3.4–4.9)
PROT SERPL-MCNC: 6.4 G/DL (ref 6.3–8)
RBC # BLD AUTO: 5.44 M/UL (ref 4.2–5.4)
SODIUM SERPL-SCNC: 138 MEQ/L (ref 135–144)
WBC # BLD AUTO: 7.8 K/UL (ref 4.8–10.8)

## 2024-01-15 PROCEDURE — 6370000000 HC RX 637 (ALT 250 FOR IP): Performed by: INTERNAL MEDICINE

## 2024-01-15 PROCEDURE — 85025 COMPLETE CBC W/AUTO DIFF WBC: CPT

## 2024-01-15 PROCEDURE — 6360000002 HC RX W HCPCS: Performed by: INTERNAL MEDICINE

## 2024-01-15 PROCEDURE — 36415 COLL VENOUS BLD VENIPUNCTURE: CPT

## 2024-01-15 PROCEDURE — APPSS45 APP SPLIT SHARED TIME 31-45 MINUTES: Performed by: PHYSICIAN ASSISTANT

## 2024-01-15 PROCEDURE — 2500000003 HC RX 250 WO HCPCS: Performed by: INTERNAL MEDICINE

## 2024-01-15 PROCEDURE — 80053 COMPREHEN METABOLIC PANEL: CPT

## 2024-01-15 PROCEDURE — 2580000003 HC RX 258: Performed by: INTERNAL MEDICINE

## 2024-01-15 PROCEDURE — 99253 IP/OBS CNSLTJ NEW/EST LOW 45: CPT | Performed by: INTERNAL MEDICINE

## 2024-01-15 RX ADMIN — INSULIN LISPRO 3 UNITS: 100 INJECTION, SOLUTION INTRAVENOUS; SUBCUTANEOUS at 11:40

## 2024-01-15 RX ADMIN — INSULIN LISPRO 1 UNITS: 100 INJECTION, SOLUTION INTRAVENOUS; SUBCUTANEOUS at 08:28

## 2024-01-15 RX ADMIN — THIAMINE HYDROCHLORIDE 100 MG: 100 INJECTION, SOLUTION INTRAMUSCULAR; INTRAVENOUS at 08:30

## 2024-01-15 RX ADMIN — DILTIAZEM HYDROCHLORIDE 7.5 MG/HR: 5 INJECTION, SOLUTION INTRAVENOUS at 00:38

## 2024-01-15 RX ADMIN — VALSARTAN 320 MG: 160 TABLET ORAL at 08:40

## 2024-01-15 RX ADMIN — METOPROLOL TARTRATE 50 MG: 50 TABLET ORAL at 08:28

## 2024-01-15 RX ADMIN — SODIUM CHLORIDE: 9 INJECTION, SOLUTION INTRAVENOUS at 08:41

## 2024-01-15 RX ADMIN — SODIUM CHLORIDE: 9 INJECTION, SOLUTION INTRAVENOUS at 00:41

## 2024-01-15 RX ADMIN — THERA TABS 1 TABLET: TAB at 08:28

## 2024-01-15 ASSESSMENT — ENCOUNTER SYMPTOMS
VOMITING: 0
ABDOMINAL PAIN: 0
SHORTNESS OF BREATH: 0
NAUSEA: 0
CHEST TIGHTNESS: 0
COLOR CHANGE: 0

## 2024-01-15 NOTE — DISCHARGE INSTRUCTIONS
Hospital Medicine Discharge Summary    Jaqueline Schmidt  :  1970  MRN:  [id number]    Admit date:  2024  Discharge date:  1/15/2024    Admitting Physician:  [unfilled]  Primary Care Physician:  @PCP@      Discharge Diagnoses:    ***    @CC@  Hospital Course:         ***    Exam on discharge: ***  @VS@  General appearance: No apparent distress, appears stated age and cooperative.  HEENT: Pupils equal, round, and reactive to light. Conjunctivae/corneas clear.  Neck: Supple, with full range of motion. No jugular venous distention. Trachea midline.  Respiratory:  Normal respiratory effort. Clear to auscultation, bilaterally without Rales/Wheezes/Rhonchi.  Cardiovascular: Regular rate and rhythm with normal S1/S2 without murmurs, rubs or gallops.  Abdomen: Soft, non-tender, non-distended with normal bowel sounds.  Musculoskeletal: No clubbing, cyanosis or edema bilaterally.  Full range of motion without deformity.  Skin: Skin color, texture, turgor normal.  No rashes or lesions.  Neuro: Non Focal. Symetrical motor and tone. Nl Comprehension, Alert,awake and oriented. NL CN. Symetrical tone and reflexes.  Psychiatric: Alert and oriented, thought content appropriate, normal insight  Capillary Refill: Brisk,< 3 seconds   Peripheral Pulses: +2 palpable, equal bilaterally     Patient was seen by the following consultants   Consults:  [unfilled]    Significant Diagnostic Studies:    Refer to chart     Please refer to chart if no studies are shown here    [unfilled]    Discharge Medications:    [unfilled]    Disposition:   If discharged to Home, Any [facility] needs that were indicated and/or required as been addressed and set up by Social Work.     Condition at discharge: good ***    Activity: {discharge activity:33259}    Total time taken for discharging this patient: 40 minutes. Greater than 70% of time was spent focused exclusively on this patient. Time was taken to review chart, discuss plans with consultants,

## 2024-01-15 NOTE — PLAN OF CARE
Problem: Discharge Planning  Goal: Discharge to home or other facility with appropriate resources  Outcome: Progressing  Flowsheets (Taken 1/15/2024 0810)  Discharge to home or other facility with appropriate resources:   Identify barriers to discharge with patient and caregiver   Arrange for needed discharge resources and transportation as appropriate   Identify discharge learning needs (meds, wound care, etc)   Refer to discharge planning if patient needs post-hospital services based on physician order or complex needs related to functional status, cognitive ability or social support system     Problem: Safety - Adult  Goal: Free from fall injury  Outcome: Progressing     Problem: Chronic Conditions and Co-morbidities  Goal: Patient's chronic conditions and co-morbidity symptoms are monitored and maintained or improved  Outcome: Progressing  Flowsheets (Taken 1/15/2024 0810)  Care Plan - Patient's Chronic Conditions and Co-Morbidity Symptoms are Monitored and Maintained or Improved: Monitor and assess patient's chronic conditions and comorbid symptoms for stability, deterioration, or improvement

## 2024-01-15 NOTE — CONSULTS
ondansetron (ZOFRAN-ODT) disintegrating tablet 4 mg  4 mg Oral Q8H PRN Cheo, Yazid R, DO        Or    ondansetron (ZOFRAN) injection 4 mg  4 mg IntraVENous Q6H PRN Cheo, Yazid R, DO        polyethylene glycol (GLYCOLAX) packet 17 g  17 g Oral Daily PRN Cheo, Yazid R, DO        acetaminophen (TYLENOL) tablet 650 mg  650 mg Oral Q6H PRN Cheo, Yazid R, DO        Or    acetaminophen (TYLENOL) suppository 650 mg  650 mg Rectal Q6H PRN Cheo, Yazid R, DO        0.9 % sodium chloride infusion   IntraVENous Continuous Cheo, Yazid R,  mL/hr at 01/15/24 0841 New Bag at 01/15/24 0841    thiamine (B-1) injection 100 mg  100 mg IntraVENous Daily Cheo, Yazid R, DO   100 mg at 01/15/24 0830    multivitamin 1 tablet  1 tablet Oral Daily Cheo, Yazid R, DO   1 tablet at 01/15/24 0828    LORazepam (ATIVAN) tablet 1 mg  1 mg Oral Q1H PRN Cheo, Yazid R, DO        Or    LORazepam (ATIVAN) injection 1 mg  1 mg IntraVENous Q1H PRN Cheo, Yazid R, DO        Or    LORazepam (ATIVAN) tablet 2 mg  2 mg Oral Q1H PRN Cheo, Yazid R, DO        Or    LORazepam (ATIVAN) injection 2 mg  2 mg IntraVENous Q1H PRN Cheo, Yazid R, DO        Or    LORazepam (ATIVAN) tablet 3 mg  3 mg Oral Q1H PRN Cheo, Yazid R, DO        Or    LORazepam (ATIVAN) injection 3 mg  3 mg IntraVENous Q1H PRN Cheo, Yazid R, DO        Or    LORazepam (ATIVAN) tablet 4 mg  4 mg Oral Q1H PRN Cheo, Yazid R, DO        Or    LORazepam (ATIVAN) injection 4 mg  4 mg IntraVENous Q1H PRN Cheo, Yazid R, DO        metoprolol tartrate (LOPRESSOR) tablet 50 mg  50 mg Oral BID Cheo, Yazid R, DO   50 mg at 01/15/24 0828    glucose chewable tablet 16 g  4 tablet Oral PRN Cheo, Yazid R, DO        dextrose bolus 10% 125 mL  125 mL IntraVENous PRN Cheo, Yazid R, DO        Or    dextrose bolus 10% 250 mL  250 mL IntraVENous PRN Cheo, Yazid R, DO        glucagon injection 1 mg  1 mg SubCUTAneous PRN Cheo, Yazid R, DO        dextrose 10

## 2024-01-15 NOTE — CARE COORDINATION
Case Management Assessment  Initial Evaluation    Date/Time of Evaluation: 1/15/2024 3:31 PM  Assessment Completed by: Adriana Goldberg RN    If patient is discharged prior to next notation, then this note serves as note for discharge by case management.    Patient Name: Jaqueline Schmidt                   YOB: 1970  Diagnosis: Atrial fibrillation (HCC) [I48.91]  Hyperglycemia [R73.9]  New onset a-fib (HCC) [I48.91]  Atrial fibrillation with RVR (HCC) [I48.91]                   Date / Time: 1/14/2024  5:58 AM    Patient Admission Status: Inpatient   Readmission Risk (Low < 19, Mod (19-27), High > 27): Readmission Risk Score: 7.7    Current PCP: Kaycee Guidry MD  PCP verified by CM? Yes    Chart Reviewed: Yes      History Provided by: Medical Record  Patient Orientation: Alert and Oriented, Person, Place, Situation, Self    Patient Cognition: Alert    Hospitalization in the last 30 days (Readmission):  No    If yes, Readmission Assessment in  Navigator will be completed.    Advance Directives:      Code Status: Full Code   Patient's Primary Decision Maker is: Legal Next of Kin      Discharge Planning:    Patient lives with: Spouse/Significant Other, Children Type of Home: House  Primary Care Giver: Self  Patient Support Systems include: Family Members   Current Financial resources:    Current community resources:    Current services prior to admission: Other (Comment)            Current DME:              Type of Home Care services:  None    ADLS  Prior functional level: Independent in ADLs/IADLs  Current functional level: Independent in ADLs/IADLs    PT AM-PAC:   /24  OT AM-PAC:   /24    Family can provide assistance at DC: Yes  Would you like Case Management to discuss the discharge plan with any other family members/significant others, and if so, who? No  Plans to Return to Present Housing: Yes  Other Identified Issues/Barriers to RETURNING to current housing:   Potential Assistance needed at

## 2024-01-15 NOTE — FLOWSHEET NOTE
Tayla SCHULTE spoke with patient regarding her wish to leave AMA. Patient signed AMA paperwork. SL and tele D/C'd. Patient stated that she drove herself and would drive herself home. Heart rhythm SR when tele d/C'd. Patient states that she feels well enough to drive.

## 2024-01-15 NOTE — PROGRESS NOTES
Internal Medicine   Hospitalist   Progress Note    1/15/2024   12:21 PM    Name:  Jaqueline Schmidt  MRN:    [id number]     IP Day: 1     Admit Date: 2024  5:58 AM  PCP: Kaycee Guidry MD    Code Status:  Full Code    Assessment and Plan:        Active Problems/ diagnosis:       53-year-old female who presented to hospital with palpitation found to be in A-fib with RVR.  She has history of alcohol daily use, diabetes and hypertension     A-fib with RVR  Diabetes-non-insulin-dependent.  Hyperglycemic  Nausea/emesis-improving.  No abdominal pain.  Hypertension  Alcohol use disorder with risk of withdrawal  Obesity     Plan  cardiac telemetry floor  Resume Lopressor. Cardizem infusion is off.  Discussed with cardiologist physician assistant.  anticoagulation as per cardiologist  CIWA protocol, as needed Ativan  Sliding-scale insulin, hypoglycemia protocol  Counseled alcohol use  As needed antiemetics  CTA of the chest and chest x-ray are negative for acute pathology  Home medication resumed once reviewed and ordered as appropriate  DVT prophylaxis    7 pm- 7 am, please contact on call Hospitalist for any needs     Subjective:      no new events. Feels well. No chest pain or palpitations.  She has been off of her Cardizem infusion.    Physical Examination:      Vitals:  BP (!) 158/90   Pulse 81   Temp 98.2 °F (36.8 °C) (Oral)   Resp 18   Ht 1.62 m (5' 3.78\")   Wt 103 kg (227 lb)   SpO2 99%   BMI 39.23 kg/m²   Temp (24hrs), Av.1 °F (36.7 °C), Min:97.7 °F (36.5 °C), Max:98.3 °F (36.8 °C)      General appearance: alert, cooperative and no distress  Mental Status: oriented to person, place and time and normal affect  Lungs: clear to auscultation bilaterally, normal effort  Heart: regular rate and rhythm, no murmur  Abdomen: soft, nontender, nondistended, bowel sounds present, no masses  Extremities: no edema, redness, tenderness in the calves  Skin: no gross lesions, rashes    Data:     Labs:  Recent Labs

## 2024-01-15 NOTE — FLOWSHEET NOTE
Cardiology here to see patient. ECHO to room for ECHO patient refused ECHO and stated that she wanted to be discharged. Dr. Blanchard explained to patient that he could not discharrge her unless he had the ECHO but it could be done right then and if then he would be able to tell if she were able to be discharged. If she refused the ECHO then she would have to sign out against medical advice.Patient stated that she wanted to leave and was willing to sign AMA but would not allow ECHO. Dr. Grider notified and agreed that if patient left without recommended testing then she would need to sign AMA.

## 2024-01-19 LAB
BACTERIA BLD CULT ORG #2: NORMAL
BACTERIA BLD CULT: NORMAL
EKG ATRIAL RATE: 159 BPM
EKG Q-T INTERVAL: 268 MS
EKG QRS DURATION: 90 MS
EKG QTC CALCULATION (BAZETT): 443 MS
EKG R AXIS: 74 DEGREES
EKG T AXIS: -59 DEGREES
EKG VENTRICULAR RATE: 165 BPM

## 2024-01-20 LAB
EKG ATRIAL RATE: 81 BPM
EKG P AXIS: 66 DEGREES
EKG P-R INTERVAL: 166 MS
EKG Q-T INTERVAL: 428 MS
EKG QRS DURATION: 78 MS
EKG QTC CALCULATION (BAZETT): 497 MS
EKG R AXIS: 58 DEGREES
EKG T AXIS: 4 DEGREES
EKG VENTRICULAR RATE: 81 BPM

## 2024-03-10 ENCOUNTER — HOSPITAL ENCOUNTER (EMERGENCY)
Age: 54
Discharge: HOME | End: 2024-03-10
Attending: EMERGENCY MEDICINE
Payer: COMMERCIAL

## 2024-03-10 VITALS
TEMPERATURE: 97.2 F | OXYGEN SATURATION: 100 % | BODY MASS INDEX: 38.41 KG/M2 | HEIGHT: 64 IN | SYSTOLIC BLOOD PRESSURE: 116 MMHG | HEART RATE: 118 BPM | DIASTOLIC BLOOD PRESSURE: 80 MMHG | RESPIRATION RATE: 17 BRPM | WEIGHT: 225 LBS

## 2024-03-10 DIAGNOSIS — R00.2 PALPITATIONS: Primary | ICD-10-CM

## 2024-03-10 DIAGNOSIS — F41.1 ANXIETY STATE: ICD-10-CM

## 2024-03-10 LAB
ALBUMIN SERPL-MCNC: 4.2 G/DL (ref 3.5–4.6)
ALP SERPL-CCNC: 105 U/L (ref 40–130)
ALT SERPL-CCNC: 29 U/L (ref 0–33)
ANION GAP SERPL CALCULATED.3IONS-SCNC: 16 MEQ/L (ref 9–15)
ANISOCYTOSIS BLD QL SMEAR: ABNORMAL
AST SERPL-CCNC: 28 U/L (ref 0–35)
BASOPHILS # BLD: 0 K/UL (ref 0–0.2)
BASOPHILS NFR BLD: 0.8 %
BILIRUB SERPL-MCNC: 0.5 MG/DL (ref 0.2–0.7)
BUN SERPL-MCNC: 12 MG/DL (ref 6–20)
CALCIUM SERPL-MCNC: 9.3 MG/DL (ref 8.5–9.9)
CHLORIDE SERPL-SCNC: 100 MEQ/L (ref 95–107)
CO2 SERPL-SCNC: 21 MEQ/L (ref 20–31)
CREAT SERPL-MCNC: 0.73 MG/DL (ref 0.5–0.9)
EOSINOPHIL # BLD: 0.1 K/UL (ref 0–0.7)
EOSINOPHIL NFR BLD: 1 %
ERYTHROCYTE [DISTWIDTH] IN BLOOD BY AUTOMATED COUNT: 15.9 % (ref 11.5–14.5)
GLOBULIN SER CALC-MCNC: 3.3 G/DL (ref 2.3–3.5)
GLUCOSE BLD-MCNC: 291 MG/DL (ref 70–99)
GLUCOSE SERPL-MCNC: 270 MG/DL (ref 70–99)
HCT VFR BLD AUTO: 46.8 % (ref 37–47)
HGB BLD-MCNC: 15.1 G/DL (ref 12–16)
LYMPHOCYTES # BLD: 7.6 K/UL (ref 1–4.8)
LYMPHOCYTES NFR BLD: 49 %
MACROCYTES BLD QL SMEAR: ABNORMAL
MAGNESIUM SERPL-MCNC: 1.8 MG/DL (ref 1.7–2.4)
MCH RBC QN AUTO: 23.4 PG (ref 27–31.3)
MCHC RBC AUTO-ENTMCNC: 32.3 % (ref 33–37)
MCV RBC AUTO: 72.7 FL (ref 79.4–94.8)
MONOCYTES # BLD: 0.4 K/UL (ref 0.2–0.8)
MONOCYTES NFR BLD: 2.9 %
NEUTROPHILS # BLD: 4.5 K/UL (ref 1.4–6.5)
NEUTS SEG NFR BLD: 36 %
PERFORMED ON: ABNORMAL
PLATELET # BLD AUTO: 289 K/UL (ref 130–400)
PLATELET BLD QL SMEAR: ADEQUATE
POIKILOCYTOSIS BLD QL SMEAR: ABNORMAL
POTASSIUM SERPL-SCNC: 3.7 MEQ/L (ref 3.4–4.9)
PROT SERPL-MCNC: 7.5 G/DL (ref 6.3–8)
RBC # BLD AUTO: 6.44 M/UL (ref 4.2–5.4)
SLIDE REVIEW: ABNORMAL
SMUDGE CELLS BLD QL SMEAR: 46.1
SODIUM SERPL-SCNC: 137 MEQ/L (ref 135–144)
STOMATOCYTES BLD QL SMEAR: ABNORMAL
TARGETS BLD QL SMEAR: ABNORMAL
TSH SERPL-MCNC: 3.02 UIU/ML (ref 0.44–3.86)
VARIANT LYMPHS NFR BLD: 11 %
WBC # BLD AUTO: 12.6 K/UL (ref 4.8–10.8)

## 2024-03-10 PROCEDURE — 96374 THER/PROPH/DIAG INJ IV PUSH: CPT

## 2024-03-10 PROCEDURE — 85025 COMPLETE CBC W/AUTO DIFF WBC: CPT

## 2024-03-10 PROCEDURE — 2500000003 HC RX 250 WO HCPCS: Performed by: EMERGENCY MEDICINE

## 2024-03-10 PROCEDURE — 84443 ASSAY THYROID STIM HORMONE: CPT

## 2024-03-10 PROCEDURE — 36415 COLL VENOUS BLD VENIPUNCTURE: CPT

## 2024-03-10 PROCEDURE — 80053 COMPREHEN METABOLIC PANEL: CPT

## 2024-03-10 PROCEDURE — 83735 ASSAY OF MAGNESIUM: CPT

## 2024-03-10 PROCEDURE — 2580000003 HC RX 258: Performed by: EMERGENCY MEDICINE

## 2024-03-10 PROCEDURE — 99284 EMERGENCY DEPT VISIT MOD MDM: CPT

## 2024-03-10 PROCEDURE — 96376 TX/PRO/DX INJ SAME DRUG ADON: CPT

## 2024-03-10 PROCEDURE — 6370000000 HC RX 637 (ALT 250 FOR IP): Performed by: EMERGENCY MEDICINE

## 2024-03-10 RX ORDER — METOPROLOL TARTRATE 1 MG/ML
5 INJECTION, SOLUTION INTRAVENOUS ONCE
Status: COMPLETED | OUTPATIENT
Start: 2024-03-10 | End: 2024-03-10

## 2024-03-10 RX ORDER — METOPROLOL TARTRATE 50 MG/1
50 TABLET, FILM COATED ORAL ONCE
Status: COMPLETED | OUTPATIENT
Start: 2024-03-10 | End: 2024-03-10

## 2024-03-10 RX ORDER — 0.9 % SODIUM CHLORIDE 0.9 %
1000 INTRAVENOUS SOLUTION INTRAVENOUS ONCE
Status: COMPLETED | OUTPATIENT
Start: 2024-03-10 | End: 2024-03-10

## 2024-03-10 RX ADMIN — SODIUM CHLORIDE 1000 ML: 9 INJECTION, SOLUTION INTRAVENOUS at 11:25

## 2024-03-10 RX ADMIN — METOPROLOL TARTRATE 50 MG: 50 TABLET ORAL at 11:27

## 2024-03-10 RX ADMIN — METOPROLOL TARTRATE 5 MG: 5 INJECTION INTRAVENOUS at 12:41

## 2024-03-10 RX ADMIN — METOPROLOL TARTRATE 5 MG: 5 INJECTION INTRAVENOUS at 13:41

## 2024-03-10 RX ADMIN — METOPROLOL TARTRATE 25 MG: 25 TABLET, FILM COATED ORAL at 14:31

## 2024-03-10 ASSESSMENT — PAIN - FUNCTIONAL ASSESSMENT: PAIN_FUNCTIONAL_ASSESSMENT: NONE - DENIES PAIN

## 2024-03-10 ASSESSMENT — LIFESTYLE VARIABLES
HOW OFTEN DO YOU HAVE A DRINK CONTAINING ALCOHOL: NEVER
HOW MANY STANDARD DRINKS CONTAINING ALCOHOL DO YOU HAVE ON A TYPICAL DAY: PATIENT DOES NOT DRINK

## 2024-03-10 NOTE — ED PROVIDER NOTES
[id number]  [phone]    Schedule an appointment as soon as possible for a visit         DISCHARGE MEDICATIONS:  New Prescriptions    No medications on file     Controlled Substances Monitoring:          No data to display                (Please note that portions of this note were completed with a voice recognition program.  Efforts were made to edit the dictations but occasionally words are mis-transcribed.)    Brea Becerra DO (electronically signed)  Attending Emergency Physician            Brea Becerra DO  03/10/24 [id number]

## 2024-03-10 NOTE — ED NOTES
The second dose of IV Lopressor didn't lower the pt's HR anymore, Dr Becerra ordered 25 mg PO.  The pt is being discharged with instructions to follow-up with her PCP.

## 2024-03-10 NOTE — ED NOTES
Pt has been a lot calm than when she arrived, she has been lying still but concerned that her HR is still over 100 BPM.

## 2024-03-14 LAB
EKG ATRIAL RATE: 68 BPM
EKG Q-T INTERVAL: 336 MS
EKG QRS DURATION: 84 MS
EKG QTC CALCULATION (BAZETT): 497 MS
EKG R AXIS: 84 DEGREES
EKG T AXIS: -11 DEGREES
EKG VENTRICULAR RATE: 132 BPM

## 2024-11-24 ENCOUNTER — APPOINTMENT (OUTPATIENT)
Dept: CARDIOLOGY | Facility: HOSPITAL | Age: 54
End: 2024-11-24

## 2024-11-24 ENCOUNTER — APPOINTMENT (OUTPATIENT)
Dept: RADIOLOGY | Facility: HOSPITAL | Age: 54
End: 2024-11-24

## 2024-11-24 PROBLEM — E11.10 DIABETIC KETOACIDOSIS WITHOUT COMA ASSOCIATED WITH TYPE 2 DIABETES MELLITUS (MULTI): Status: ACTIVE | Noted: 2024-11-24

## 2024-11-24 PROCEDURE — 93005 ELECTROCARDIOGRAM TRACING: CPT

## 2024-11-24 PROCEDURE — 71045 X-RAY EXAM CHEST 1 VIEW: CPT

## 2024-11-24 PROCEDURE — 74177 CT ABD & PELVIS W/CONTRAST: CPT

## 2024-11-24 PROCEDURE — 70450 CT HEAD/BRAIN W/O DYE: CPT

## 2024-11-25 ENCOUNTER — APPOINTMENT (OUTPATIENT)
Dept: CARDIOLOGY | Facility: HOSPITAL | Age: 54
End: 2024-11-25

## 2024-11-25 PROCEDURE — 93306 TTE W/DOPPLER COMPLETE: CPT

## 2024-11-25 PROCEDURE — 93306 TTE W/DOPPLER COMPLETE: CPT | Performed by: INTERNAL MEDICINE

## 2024-11-25 PROCEDURE — RXMED WILLOW AMBULATORY MEDICATION CHARGE

## 2024-11-26 ENCOUNTER — PHARMACY VISIT (OUTPATIENT)
Dept: PHARMACY | Facility: CLINIC | Age: 54
End: 2024-11-26
Payer: COMMERCIAL

## 2024-11-26 PROCEDURE — RXMED WILLOW AMBULATORY MEDICATION CHARGE

## 2024-11-26 RX ORDER — PEN NEEDLE, DIABETIC 30 GX3/16"
NEEDLE, DISPOSABLE MISCELLANEOUS
Qty: 100 EACH | Refills: 3 | OUTPATIENT
Start: 2024-11-26

## 2024-12-20 ENCOUNTER — TELEPHONE (OUTPATIENT)
Dept: CARDIOLOGY CLINIC | Age: 54
End: 2024-12-20

## 2024-12-23 ENCOUNTER — PHARMACY VISIT (OUTPATIENT)
Dept: PHARMACY | Facility: CLINIC | Age: 54
End: 2024-12-23
Payer: COMMERCIAL

## 2024-12-23 PROCEDURE — RXMED WILLOW AMBULATORY MEDICATION CHARGE

## 2025-01-06 ENCOUNTER — OFFICE VISIT (OUTPATIENT)
Dept: CARDIOLOGY CLINIC | Age: 55
End: 2025-01-06

## 2025-01-06 VITALS
BODY MASS INDEX: 36.05 KG/M2 | RESPIRATION RATE: 16 BRPM | WEIGHT: 210 LBS | HEART RATE: 81 BPM | OXYGEN SATURATION: 99 %

## 2025-01-06 DIAGNOSIS — I10 ESSENTIAL HYPERTENSION, BENIGN: Primary | ICD-10-CM

## 2025-01-06 PROCEDURE — 99214 OFFICE O/P EST MOD 30 MIN: CPT | Performed by: INTERNAL MEDICINE

## 2025-01-06 RX ORDER — ATORVASTATIN CALCIUM 20 MG/1
20 TABLET, FILM COATED ORAL DAILY
Qty: 90 TABLET | Refills: 5 | Status: SHIPPED | OUTPATIENT
Start: 2025-01-06

## 2025-01-06 ASSESSMENT — ENCOUNTER SYMPTOMS
CHEST TIGHTNESS: 0
APNEA: 0
ABDOMINAL PAIN: 0
NAUSEA: 0
EYE REDNESS: 0
RHINORRHEA: 0
COUGH: 0
VOMITING: 0
SHORTNESS OF BREATH: 0
WHEEZING: 0
DIARRHEA: 0
COLOR CHANGE: 0
CONSTIPATION: 0

## 2025-01-06 NOTE — PROGRESS NOTES
Chief Complaint   Patient presents with    New Patient    Follow-Up from Hospital       Patient presents for initial medical evaluation. Patient is followed on a regular basis by Kaycee Herrera MD.     Atrial fibrillation diagnosed on 11/2024  Hypertension  Hyperlipidemia  Diabetes  Obesity  Former tobacco abuse    Cardiac studies:  TTE 11/25/2024 EF 55 to 60% no major valvular disease at   ========  1/6/2025  First clinic visit follow-up on recent hospitalization  I initially met this patient a year ago when she was admitted to the hospital for palpitations  At that time patient was found with atrial fibrillation  Patient converted back to sinus and was discharged the following day  Subsequently patient was supposed to follow-up with me in clinic, and I [facility] the patient [facility] November 20[facility]  At that time patient was admitted to the hospital for an episode of nausea and vomiting  Patient was found in DKA  A-fib with RVR was also noted which was well-controlled with metoprolol  Today patient reports feeling better  Patient also asking if she can be cleared to return to work  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  Patient reports that she is very physically active she has stairs at home she is constantly climbing stairs every day without cardiac limitations  Patient also walking on a regular basis without cardiac limitations  From cardiology standpoint patient can return to work      Patient Active Problem List   Diagnosis    Obesity (BMI 30-39.9)    History of medication noncompliance    Essential hypertension, benign    Type 2 diabetes mellitus without complication (HCC)    Right wrist pain    Chronic cough    Palpitations    Alpha thalassemia trait    Muscle strain    Abscess    Perirectal abscess    Leukocytosis    Diabetes mellitus due to underlying condition, uncontrolled, with hyperglycemia (HCC)    Poorly controlled diabetes mellitus (HCC)    New onset a-fib

## 2025-03-10 ENCOUNTER — PHARMACY VISIT (OUTPATIENT)
Dept: PHARMACY | Facility: CLINIC | Age: 55
End: 2025-03-10
Payer: COMMERCIAL

## 2025-03-10 PROCEDURE — RXMED WILLOW AMBULATORY MEDICATION CHARGE

## 2025-03-28 ENCOUNTER — HOSPITAL ENCOUNTER (EMERGENCY)
Facility: HOSPITAL | Age: 55
Discharge: HOME | End: 2025-03-29

## 2025-03-28 DIAGNOSIS — R11.0 NAUSEA: Primary | ICD-10-CM

## 2025-03-28 PROCEDURE — 80053 COMPREHEN METABOLIC PANEL: CPT | Performed by: NURSE PRACTITIONER

## 2025-03-28 PROCEDURE — 36415 COLL VENOUS BLD VENIPUNCTURE: CPT | Performed by: NURSE PRACTITIONER

## 2025-03-28 PROCEDURE — 99284 EMERGENCY DEPT VISIT MOD MDM: CPT | Mod: 25

## 2025-03-28 PROCEDURE — 93005 ELECTROCARDIOGRAM TRACING: CPT

## 2025-03-28 PROCEDURE — 83735 ASSAY OF MAGNESIUM: CPT | Performed by: NURSE PRACTITIONER

## 2025-03-28 PROCEDURE — 83690 ASSAY OF LIPASE: CPT | Performed by: NURSE PRACTITIONER

## 2025-03-28 RX ORDER — FAMOTIDINE 10 MG/ML
20 INJECTION, SOLUTION INTRAVENOUS ONCE
Status: COMPLETED | OUTPATIENT
Start: 2025-03-28 | End: 2025-03-29

## 2025-03-28 RX ORDER — ONDANSETRON HYDROCHLORIDE 2 MG/ML
4 INJECTION, SOLUTION INTRAVENOUS ONCE
Status: COMPLETED | OUTPATIENT
Start: 2025-03-28 | End: 2025-03-29

## 2025-03-28 ASSESSMENT — LIFESTYLE VARIABLES
TOTAL SCORE: 0
HAVE PEOPLE ANNOYED YOU BY CRITICIZING YOUR DRINKING: NO
HAVE YOU EVER FELT YOU SHOULD CUT DOWN ON YOUR DRINKING: NO
EVER HAD A DRINK FIRST THING IN THE MORNING TO STEADY YOUR NERVES TO GET RID OF A HANGOVER: NO
EVER FELT BAD OR GUILTY ABOUT YOUR DRINKING: NO

## 2025-03-28 ASSESSMENT — COLUMBIA-SUICIDE SEVERITY RATING SCALE - C-SSRS
1. IN THE PAST MONTH, HAVE YOU WISHED YOU WERE DEAD OR WISHED YOU COULD GO TO SLEEP AND NOT WAKE UP?: NO
2. HAVE YOU ACTUALLY HAD ANY THOUGHTS OF KILLING YOURSELF?: NO
6. HAVE YOU EVER DONE ANYTHING, STARTED TO DO ANYTHING, OR PREPARED TO DO ANYTHING TO END YOUR LIFE?: NO

## 2025-03-28 ASSESSMENT — PAIN SCALES - GENERAL: PAINLEVEL_OUTOF10: 0 - NO PAIN

## 2025-03-28 ASSESSMENT — PAIN - FUNCTIONAL ASSESSMENT: PAIN_FUNCTIONAL_ASSESSMENT: 0-10

## 2025-03-29 ENCOUNTER — APPOINTMENT (OUTPATIENT)
Dept: CARDIOLOGY | Facility: HOSPITAL | Age: 55
End: 2025-03-29

## 2025-03-29 VITALS
OXYGEN SATURATION: 98 % | DIASTOLIC BLOOD PRESSURE: 87 MMHG | HEART RATE: 89 BPM | TEMPERATURE: 97 F | BODY MASS INDEX: 35.85 KG/M2 | RESPIRATION RATE: 16 BRPM | HEIGHT: 64 IN | SYSTOLIC BLOOD PRESSURE: 160 MMHG | WEIGHT: 210 LBS

## 2025-03-29 LAB
ALBUMIN SERPL BCP-MCNC: 4.5 G/DL (ref 3.4–5)
ALP SERPL-CCNC: 91 U/L (ref 33–110)
ALT SERPL W P-5'-P-CCNC: 27 U/L (ref 7–45)
ANION GAP SERPL CALC-SCNC: 19 MMOL/L (ref 10–20)
AST SERPL W P-5'-P-CCNC: 35 U/L (ref 9–39)
ATRIAL RATE: 83 BPM
BASOPHILS # BLD AUTO: 0.08 X10*3/UL (ref 0–0.1)
BASOPHILS NFR BLD AUTO: 0.9 %
BILIRUB SERPL-MCNC: 0.9 MG/DL (ref 0–1.2)
BUN SERPL-MCNC: 18 MG/DL (ref 6–23)
CALCIUM SERPL-MCNC: 9.9 MG/DL (ref 8.6–10.3)
CHLORIDE SERPL-SCNC: 101 MMOL/L (ref 98–107)
CO2 SERPL-SCNC: 20 MMOL/L (ref 21–32)
CREAT SERPL-MCNC: 0.95 MG/DL (ref 0.5–1.05)
EGFRCR SERPLBLD CKD-EPI 2021: 72 ML/MIN/1.73M*2
EOSINOPHIL # BLD AUTO: 0.19 X10*3/UL (ref 0–0.7)
EOSINOPHIL NFR BLD AUTO: 2 %
ERYTHROCYTE [DISTWIDTH] IN BLOOD BY AUTOMATED COUNT: 15 % (ref 11.5–14.5)
GLUCOSE SERPL-MCNC: 187 MG/DL (ref 74–99)
HCT VFR BLD AUTO: 37.5 % (ref 36–46)
HGB BLD-MCNC: 12.3 G/DL (ref 12–16)
HOLD SPECIMEN: NORMAL
IMM GRANULOCYTES # BLD AUTO: 0.02 X10*3/UL (ref 0–0.7)
IMM GRANULOCYTES NFR BLD AUTO: 0.2 % (ref 0–0.9)
LIPASE SERPL-CCNC: 30 U/L (ref 9–82)
LYMPHOCYTES # BLD AUTO: 4.21 X10*3/UL (ref 1.2–4.8)
LYMPHOCYTES NFR BLD AUTO: 45.2 %
MAGNESIUM SERPL-MCNC: 2 MG/DL (ref 1.6–2.4)
MCH RBC QN AUTO: 23 PG (ref 26–34)
MCHC RBC AUTO-ENTMCNC: 32.8 G/DL (ref 32–36)
MCV RBC AUTO: 70 FL (ref 80–100)
MONOCYTES # BLD AUTO: 0.54 X10*3/UL (ref 0.1–1)
MONOCYTES NFR BLD AUTO: 5.8 %
NEUTROPHILS # BLD AUTO: 4.28 X10*3/UL (ref 1.2–7.7)
NEUTROPHILS NFR BLD AUTO: 45.9 %
NRBC BLD-RTO: 0 /100 WBCS (ref 0–0)
P AXIS: 78 DEGREES
P OFFSET: 196 MS
P ONSET: 134 MS
PLATELET # BLD AUTO: 269 X10*3/UL (ref 150–450)
POTASSIUM SERPL-SCNC: 4 MMOL/L (ref 3.5–5.3)
PR INTERVAL: 172 MS
PROT SERPL-MCNC: 8.6 G/DL (ref 6.4–8.2)
Q ONSET: 220 MS
QRS COUNT: 14 BEATS
QRS DURATION: 90 MS
QT INTERVAL: 418 MS
QTC CALCULATION(BAZETT): 491 MS
QTC FREDERICIA: 466 MS
R AXIS: 62 DEGREES
RBC # BLD AUTO: 5.35 X10*6/UL (ref 4–5.2)
SODIUM SERPL-SCNC: 136 MMOL/L (ref 136–145)
T AXIS: 13 DEGREES
T OFFSET: 429 MS
VENTRICULAR RATE: 83 BPM
WBC # BLD AUTO: 9.3 X10*3/UL (ref 4.4–11.3)

## 2025-03-29 PROCEDURE — 36415 COLL VENOUS BLD VENIPUNCTURE: CPT | Performed by: NURSE PRACTITIONER

## 2025-03-29 PROCEDURE — 85025 COMPLETE CBC W/AUTO DIFF WBC: CPT | Performed by: NURSE PRACTITIONER

## 2025-03-29 PROCEDURE — 2500000004 HC RX 250 GENERAL PHARMACY W/ HCPCS (ALT 636 FOR OP/ED): Performed by: NURSE PRACTITIONER

## 2025-03-29 PROCEDURE — 96361 HYDRATE IV INFUSION ADD-ON: CPT

## 2025-03-29 PROCEDURE — 96374 THER/PROPH/DIAG INJ IV PUSH: CPT

## 2025-03-29 PROCEDURE — 96375 TX/PRO/DX INJ NEW DRUG ADDON: CPT

## 2025-03-29 RX ORDER — ONDANSETRON 4 MG/1
4 TABLET, ORALLY DISINTEGRATING ORAL EVERY 8 HOURS PRN
Qty: 20 TABLET | Refills: 0 | Status: SHIPPED | OUTPATIENT
Start: 2025-03-29 | End: 2025-03-29

## 2025-03-29 RX ORDER — FAMOTIDINE 20 MG/1
20 TABLET, FILM COATED ORAL 2 TIMES DAILY
Qty: 30 TABLET | Refills: 0 | Status: SHIPPED | OUTPATIENT
Start: 2025-03-29 | End: 2025-04-15

## 2025-03-29 RX ORDER — ONDANSETRON 4 MG/1
4 TABLET, ORALLY DISINTEGRATING ORAL EVERY 8 HOURS PRN
Qty: 20 TABLET | Refills: 0 | Status: SHIPPED | OUTPATIENT
Start: 2025-03-29 | End: 2025-04-07

## 2025-03-29 RX ORDER — FAMOTIDINE 20 MG/1
20 TABLET, FILM COATED ORAL 2 TIMES DAILY
Qty: 30 TABLET | Refills: 0 | Status: SHIPPED | OUTPATIENT
Start: 2025-03-29 | End: 2025-03-29

## 2025-03-29 RX ADMIN — FAMOTIDINE 20 MG: 10 INJECTION, SOLUTION INTRAVENOUS at 00:12

## 2025-03-29 RX ADMIN — SODIUM CHLORIDE 1000 ML: 9 INJECTION, SOLUTION INTRAVENOUS at 00:14

## 2025-03-29 RX ADMIN — ONDANSETRON 4 MG: 2 INJECTION INTRAMUSCULAR; INTRAVENOUS at 00:12

## 2025-03-29 ASSESSMENT — PAIN SCALES - GENERAL: PAINLEVEL_OUTOF10: 1

## 2025-03-29 ASSESSMENT — PAIN - FUNCTIONAL ASSESSMENT: PAIN_FUNCTIONAL_ASSESSMENT: 0-10

## 2025-03-29 NOTE — ED PROVIDER NOTES
"HPI   Chief Complaint   Patient presents with    \"I have food in my stomach sitting there'     \"I have food in my stomach and I want to throw up but dont want to put my self in Afib\"       Patient is a healthy nontoxic-appearing 54-year-old female with a past medical history of diabetic ketoacidosis without coma, type 2 diabetes, paroxysmal atrial fibrillation, presents the emergency today for complaint nausea.  Patient states symptoms began several hours prior to arrival after eating spaghetti.  Patient states he tried taking slow bites however began feeling like shift she was going to throw up.  Patient states she was concerned that if she begins vomiting she will go into A-fib as this has happened in the past.  Patient denies any chest pain, shortness of breath or difficulty breathing, palpitations or sensation of A-fib at this time.  Patient denies any diarrhea or constipation.  Patient denies any abdominal pain, urinary complaints, fever, shaking, or chills.              Patient History   Past Medical History:   Diagnosis Date    Diabetes mellitus (Multi)     Hypertension      No past surgical history on file.  No family history on file.  Social History     Tobacco Use    Smoking status: Some Days     Current packs/day: 1.00     Average packs/day: 1 pack/day for 36.2 years (36.2 ttl pk-yrs)     Types: Cigarettes     Start date: 1989    Smokeless tobacco: Not on file   Substance Use Topics    Alcohol use: Never    Drug use: Never       Physical Exam   ED Triage Vitals [03/28/25 2315]   Temperature Heart Rate Respirations BP   36.1 °C (97 °F) 92 19 (!) 180/97      Pulse Ox Temp Source Heart Rate Source Patient Position   99 % Temporal Monitor Sitting      BP Location FiO2 (%)     Right arm --       Physical Exam  Vitals and nursing note reviewed. Exam conducted with a chaperone present.   Constitutional:       General: She is not in acute distress.     Appearance: Normal appearance. She is not ill-appearing, " toxic-appearing or diaphoretic.   HENT:      Head: Normocephalic.      Nose: Nose normal.      Mouth/Throat:      Mouth: Mucous membranes are moist.   Eyes:      Extraocular Movements: Extraocular movements intact.      Pupils: Pupils are equal, round, and reactive to light.   Cardiovascular:      Rate and Rhythm: Normal rate and regular rhythm.      Pulses: Normal pulses.      Heart sounds: Normal heart sounds. No murmur heard.     No friction rub. No gallop.   Pulmonary:      Effort: Pulmonary effort is normal. No respiratory distress.      Breath sounds: Normal breath sounds. No stridor. No wheezing, rhonchi or rales.   Chest:      Chest wall: No tenderness.   Abdominal:      General: Abdomen is flat. There is no distension.      Palpations: Abdomen is soft. There is no mass.      Tenderness: There is no abdominal tenderness. There is no right CVA tenderness, left CVA tenderness, guarding or rebound.      Hernia: No hernia is present.   Musculoskeletal:         General: Normal range of motion.      Cervical back: Normal range of motion and neck supple.   Skin:     General: Skin is warm and dry.      Capillary Refill: Capillary refill takes less than 2 seconds.      Coloration: Skin is not jaundiced or pale.      Findings: No bruising, erythema, lesion or rash.   Neurological:      Mental Status: She is alert.           ED Course & MDM   ED Course as of 03/29/25 0219   Sat Mar 29, 2025   0113 EKG interpreted by myself reveals normal sinus rhythm at a rate of 83 bpm with no ST elevation however there is T wave inversion in lead V1 and lead III that is similar in comparison to previous EKG, OR interval of 172, QRS of 90 and QTc of 491 [EC]      ED Course User Index  [EC] LEONELA Hatfield-CNP         Diagnoses as of 03/29/25 0219   Nausea                 No data recorded     Murali Coma Scale Score: 15 (03/28/25 2317 : Kasandra Villegas RN)                           Medical Decision Making  Given patient's complaint  presentation a thorough exam was performed.  Patient remains hemodynamically stable during emergency evaluation, is afebrile, no adventitious lung sounds auscultated, speaking complete sentences no respiratory distress, cardiac sounds auscultated are regular, denies any chest pain, shortness of breath or difficulty breathing, has no reproducible abdominal tenderness upon palpation, I have a low suspicion for ACS, pulmonary embolism, aortic aneurysm, acute abdomen.  Lab work was obtained and patient received IV fluid, Zofran and Pepcid in the emergency room.  Lab work reveals several irregularities without any critical lab values.  Reevaluation of patient reveals significant improvement and she states nausea sensation is completely resolved and she is requesting discharge home.  Patient received prescription for Pepcid and Zofran and I strongly encouraged following up with GI specialist or primary care provider in the next several weeks however symptoms become worse return to the emergency room for further evaluation.  Patient was agreeable this plan and discharged home in stable condition.    DAVIN Hatfield     Portions of this note were generated using digital voice recognition software, and may contain grammatical errors      Procedure  Procedures     DAVIN Hatfield  03/29/25 0219

## 2025-03-31 ENCOUNTER — PHARMACY VISIT (OUTPATIENT)
Dept: PHARMACY | Facility: CLINIC | Age: 55
End: 2025-03-31
Payer: COMMERCIAL

## 2025-03-31 PROCEDURE — RXMED WILLOW AMBULATORY MEDICATION CHARGE

## (undated) DEVICE — SYRINGE IRRIG 60ML SFT PLIABLE BLB EZ TO GRP 1 HND USE W/

## (undated) DEVICE — YANKAUER,SMOOTH HANDLE,HIGH CAPACITY: Brand: MEDLINE INDUSTRIES, INC.

## (undated) DEVICE — BLADE,CARBON-STEEL,15,STRL,DISPOSABLE,TB: Brand: MEDLINE

## (undated) DEVICE — GAUZE,SPONGE,FLUFF,6"X6.75",STRL,10/TRAY: Brand: MEDLINE

## (undated) DEVICE — SINGLE PORT MANIFOLD: Brand: NEPTUNE 2

## (undated) DEVICE — GOWN,AURORA,NONREINFORCED,LARGE: Brand: MEDLINE

## (undated) DEVICE — TUBING, SUCTION, 9/32" X 12', STRAIGHT: Brand: MEDLINE INDUSTRIES, INC.

## (undated) DEVICE — GLOVE ORANGE PI 7 1/2   MSG9075

## (undated) DEVICE — COVER LT HNDL BLU PLAS

## (undated) DEVICE — SPONGE,LAP,18"X18",DLX,XR,ST,5/PK,40/PK: Brand: MEDLINE

## (undated) DEVICE — NEPTUNE E-SEP SMOKE EVACUATION PENCIL, COATED, 70MM BLADE, PUSH BUTTON SWITCH: Brand: NEPTUNE E-SEP

## (undated) DEVICE — GOWN,SIRUS,POLYRNF,BRTHSLV,XLN/XL,20/CS: Brand: MEDLINE

## (undated) DEVICE — ELECTRODE PT RET AD L9FT HI MOIST COND ADH HYDRGEL CORDED

## (undated) DEVICE — COUNTER NDL 40 COUNT HLD 70 FOAM BLK ADH W/ MAG

## (undated) DEVICE — LABEL MED MINI W/ MARKER

## (undated) DEVICE — TOWEL,OR,DSP,ST,BLUE,STD,4/PK,20PK/CS: Brand: MEDLINE

## (undated) DEVICE — SKIN PREP TRAY 4 COMPARTM TRAY: Brand: MEDLINE INDUSTRIES, INC.